# Patient Record
Sex: FEMALE | Race: WHITE | Employment: FULL TIME | URBAN - METROPOLITAN AREA
[De-identification: names, ages, dates, MRNs, and addresses within clinical notes are randomized per-mention and may not be internally consistent; named-entity substitution may affect disease eponyms.]

---

## 2018-08-13 ENCOUNTER — TELEPHONE (OUTPATIENT)
Dept: ORTHOPEDIC SURGERY | Age: 48
End: 2018-08-13

## 2018-08-13 ENCOUNTER — OFFICE VISIT (OUTPATIENT)
Dept: ORTHOPEDIC SURGERY | Age: 48
End: 2018-08-13

## 2018-08-13 VITALS
HEIGHT: 66 IN | RESPIRATION RATE: 16 BRPM | BODY MASS INDEX: 47.09 KG/M2 | WEIGHT: 293 LBS | SYSTOLIC BLOOD PRESSURE: 136 MMHG | HEART RATE: 74 BPM | DIASTOLIC BLOOD PRESSURE: 80 MMHG

## 2018-08-13 DIAGNOSIS — S92.301A CLOSED AVULSION FRACTURE OF METATARSAL BONE OF RIGHT FOOT, INITIAL ENCOUNTER: Primary | ICD-10-CM

## 2018-08-13 PROCEDURE — 99214 OFFICE O/P EST MOD 30 MIN: CPT | Performed by: PHYSICIAN ASSISTANT

## 2018-08-13 PROCEDURE — 1036F TOBACCO NON-USER: CPT | Performed by: PHYSICIAN ASSISTANT

## 2018-08-13 PROCEDURE — G8417 CALC BMI ABV UP PARAM F/U: HCPCS | Performed by: PHYSICIAN ASSISTANT

## 2018-08-13 PROCEDURE — L4360 PNEUMAT WALKING BOOT PRE CST: HCPCS | Performed by: PHYSICIAN ASSISTANT

## 2018-08-13 PROCEDURE — G8427 DOCREV CUR MEDS BY ELIG CLIN: HCPCS | Performed by: PHYSICIAN ASSISTANT

## 2018-08-13 RX ORDER — BUDESONIDE AND FORMOTEROL FUMARATE DIHYDRATE 160; 4.5 UG/1; UG/1
AEROSOL RESPIRATORY (INHALATION)
COMMUNITY
Start: 2018-06-18 | End: 2021-01-06

## 2018-08-13 NOTE — PROGRESS NOTES
Patient Name: Manan Rodriguez  Medical Record Number: [id number]  YOB: 1970  Date of Encounter: 8/13/2018     Chief Complaint   Patient presents with    New Patient     right foot pain. DOI:08/06/2018. History of Present Illness:  Manan Rodriguez is a 53 y.o. female here for evaluation of her right foot. Her pain assessment is documented below and I reviewed this with her today. Patient states one week ago she tripped over a shoe and twisted her right foot. She has had pain around the lateral right foot. She was seen at urgent care and had x-rays and was told she had an avulsion fracture of the proximal fifth metatarsal.  She was fitted into a postop shoe. Patient denies pain to the right ankle, knee or hip. She denies numbness or tingling in the right foot. She is not using crutches, walker, cane or rolling scooter. Pain Assessment  Location of Pain: Foot  Location Modifiers: Right  Severity of Pain: 6  Quality of Pain: Other (Comment) (burning)  Duration of Pain: A few hours  Frequency of Pain: Intermittent  Date Pain First Started: 08/06/18  Aggravating Factors: Walking, Standing, Stairs  Limiting Behavior: Some  Relieving Factors: Rest  Result of Injury: Yes  Work-Related Injury: No  Are there other pain locations you wish to document?: No    Past Medical History:   Diagnosis Date    Asthma        Past Surgical History:   Procedure Laterality Date    CARPAL TUNNEL RELEASE      2 ON EACH SIDE    HERNIA REPAIR      X2    HYSTERECTOMY         Current Outpatient Prescriptions   Medication Sig Dispense Refill    budesonide-formoterol (SYMBICORT) 160-4.5 MCG/ACT AERO INHALE TWO PUFFS BY MOUTH TWICE A DAY      oxyCODONE-acetaminophen (PERCOCET)  MG per tablet       ibuprofen (ADVIL;MOTRIN) 800 MG tablet       mometasone-formoterol (DULERA) 200-5 MCG/ACT inhaler Inhale 2 puffs into the lungs every 12 hours.  montelukast (SINGULAIR) 10 MG tablet Take 10 mg by mouth nightly.       albuterol (PROVENTIL HFA;VENTOLIN HFA) 108 (90 BASE) MCG/ACT inhaler Inhale 2 puffs into the lungs every 6 hours as needed for Wheezing.  diclofenac (VOLTAREN) 75 MG EC tablet       oxyCODONE-acetaminophen (PERCOCET) 5-325 MG per tablet       predniSONE (DELTASONE) 20 MG tablet       NONFORMULARY Diet pill      diazepam (VALIUM) 5 MG tablet Take 1 tablet by mouth every 12 hours as needed (pain, spasm) for up to 10 doses. 10 tablet 0    ibuprofen (ADVIL;MOTRIN) 600 MG tablet Take 1 tablet by mouth every 8 hours as needed for Pain. 20 tablet 0     No current facility-administered medications for this visit. Allergies, social and family histories were reviewed and updated as appropriate. Review of Systems:  Relevant review of systems reviewed and available in the patient's chart under the 'MEDIA' tab. Vital Signs:  /80   Pulse 74   Resp 16   Ht 5' 6\" (1.676 m)   Wt 300 lb (136.1 kg)   BMI 48.42 kg/m²     General Exam:   Constitutional: Patient is adequately groomed with no evidence of malnutrition  Mental Status: The patient is oriented to time, place and person. The patient's mood and affect are appropriate. Lymphatic: The lymphatic examination bilaterally reveals all areas to be without enlargement or induration. Vascular: Examination reveals no swelling or calf tenderness. Peripheral pulses are palpable and 2+. Neurological: The patient has good coordination. There is no focal weakness or sensory deficit. Right Foot Examination:    Inspection: Normal ankle and foot alignment. Normal muscle contours and no significant limb length discrepancy. No gross atrophy in any particular myotome. There is no obvious swelling of the foot. There are no abrasions, lacerations, contusions, hematomas or ecchymosis. There is no erythema, induration or warmth to suggest an infectious process.      Palpation: Patient has tenderness on palpation of the proximal fifth metatarsal    Range of fracture of fifth metatarsal bone of right foot, initial encounter Yes       Treatment Plan:          Patient presented with a possible avulsion fracture of the proximal right fifth metatarsal.  She still having pain she rates a 6/10. She is fitted into a short walking boot. She is advised to come out of the boot to sleep and shower. She is advised to come out of the boot several times a day to ice. She will continue elevating. She is to take Tylenol and/or Motrin as needed for pain. She states she does work as a  and is on her feet for 8 hours. She is given a note stating no more than 4 hours of walking or standing. She states because she has the boot on they will give her a sitdown job. She will return in 2 weeks at which time we will repeat imaging. Kita Molina was informed of the results of any imaging. We discussed treatment options and a time was given to answer questions. A plan was proposed and Kita Molina understand and accepts this course of care. Electronically signed by Koko Ahuja PA-C on 6/50/8254  Board Certified [facility]    Please note that portions of this note were completed with a voice recognition program.  Efforts were made to edit the dictations but occasionally words are mis-transcribed.

## 2018-08-13 NOTE — LETTER
[facility]  [address]  [address] [facility],[address] [address]  Phone: [phone]  Fax: [phone]    Flori Reveles MD        August 13, 2018       Patient: Vanesa Mallory   MR Number: [id number]   YOB: 1970   Date of Visit: 8/13/2018       Dear Dr. Yesy Holt: Thank you for the request for consultation for Miguelito Langley to me for the evaluation of her right foot. Below are the relevant portions of my assessment and plan of care. If you have questions, please do not hesitate to call me. I look forward to following Char Short along with you.     Sincerely,    Dr. Amanda Cristina, PA-C    CC providers:  Yesica Joya MD  [address]  VIA Mail

## 2018-08-13 NOTE — LETTER
[facility] [address] [address]  Phone: [phone]  Fax: [phone]    Yesy Pathak        August 13, 2018     Patient: Mars Millan   YOB: 1970   Date of Visit: 8/13/2018       To Whom It May Concern: It is my medical opinion that Irais Powell may return to light duty immediately with the following restrictions: may work only 4 hour shifts and must wear boot. Restrictions are in effect for 2 weeks until reevaluated. .    If you have any questions or concerns, please don't hesitate to call.     Sincerely,          Ander Wallis PA-C

## 2018-08-13 NOTE — TELEPHONE ENCOUNTER
8/13/18  DME  - NO PRECERT REQUIRED FOR IN NETWORK PROVIDERS AND LESS THAN $750 - PER [facility] GUIDELINES/NOTES - NDS

## 2018-08-13 NOTE — TELEPHONE ENCOUNTER
Spoke with marylou. Patient may return to work with sit down only job for 2 weeks until follow up  appt on 08/31/2018.  Patient came into office and picked up note

## 2018-08-14 ENCOUNTER — TELEPHONE (OUTPATIENT)
Dept: ORTHOPEDIC SURGERY | Age: 48
End: 2018-08-14

## 2018-08-16 ENCOUNTER — TELEPHONE (OUTPATIENT)
Dept: ORTHOPEDIC SURGERY | Age: 48
End: 2018-08-16

## 2018-08-16 NOTE — TELEPHONE ENCOUNTER
Patient calling to get a RTW work note. Currently she is to wear a boot however her employer is unable to find thing for her to do. She is wanting to take the boot off, wrap her foot in ace wrap (well padded) and put her shoe on. 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 She will keep the current restictions other than wearing the boot. If we can not change this and allow her to wear a shoe, they will keep sending the patient home unable to work.      Please call to discuss  [phone]

## 2018-08-31 ENCOUNTER — OFFICE VISIT (OUTPATIENT)
Dept: ORTHOPEDIC SURGERY | Age: 48
End: 2018-08-31

## 2018-08-31 VITALS — BODY MASS INDEX: 47.09 KG/M2 | HEIGHT: 66 IN | WEIGHT: 293 LBS

## 2018-08-31 DIAGNOSIS — S92.354D CLOSED NONDISPLACED FRACTURE OF FIFTH METATARSAL BONE OF RIGHT FOOT WITH ROUTINE HEALING, SUBSEQUENT ENCOUNTER: Primary | ICD-10-CM

## 2018-08-31 PROCEDURE — G8417 CALC BMI ABV UP PARAM F/U: HCPCS | Performed by: PHYSICIAN ASSISTANT

## 2018-08-31 PROCEDURE — 99213 OFFICE O/P EST LOW 20 MIN: CPT | Performed by: PHYSICIAN ASSISTANT

## 2018-08-31 PROCEDURE — 1036F TOBACCO NON-USER: CPT | Performed by: PHYSICIAN ASSISTANT

## 2018-08-31 PROCEDURE — G8427 DOCREV CUR MEDS BY ELIG CLIN: HCPCS | Performed by: PHYSICIAN ASSISTANT

## 2018-09-04 ENCOUNTER — TELEPHONE (OUTPATIENT)
Dept: ORTHOPEDIC SURGERY | Age: 48
End: 2018-09-04

## 2018-09-04 NOTE — TELEPHONE ENCOUNTER
Pt states she was seen by Edson Paula and needs her rtw note changed   Wants her note to say that she was seen on 8/31/18 and has restrictions until her next mandeep ton 9/28/18  Pt can't speak until after 3 pm but is wanting to  new note this afternoon  pls call pt

## 2018-09-28 ENCOUNTER — OFFICE VISIT (OUTPATIENT)
Dept: ORTHOPEDIC SURGERY | Age: 48
End: 2018-09-28
Payer: COMMERCIAL

## 2018-09-28 VITALS
HEIGHT: 66 IN | SYSTOLIC BLOOD PRESSURE: 124 MMHG | DIASTOLIC BLOOD PRESSURE: 79 MMHG | HEART RATE: 86 BPM | BODY MASS INDEX: 47.09 KG/M2 | WEIGHT: 293 LBS

## 2018-09-28 DIAGNOSIS — S92.354D CLOSED NONDISPLACED FRACTURE OF FIFTH METATARSAL BONE OF RIGHT FOOT WITH ROUTINE HEALING, SUBSEQUENT ENCOUNTER: Primary | ICD-10-CM

## 2018-09-28 PROCEDURE — 1036F TOBACCO NON-USER: CPT | Performed by: PHYSICIAN ASSISTANT

## 2018-09-28 PROCEDURE — 99213 OFFICE O/P EST LOW 20 MIN: CPT | Performed by: PHYSICIAN ASSISTANT

## 2018-09-28 PROCEDURE — G8417 CALC BMI ABV UP PARAM F/U: HCPCS | Performed by: PHYSICIAN ASSISTANT

## 2018-09-28 PROCEDURE — G8427 DOCREV CUR MEDS BY ELIG CLIN: HCPCS | Performed by: PHYSICIAN ASSISTANT

## 2018-09-28 NOTE — LETTER
[facility]  [address] [address]  Phone: [phone]  Fax: [phone]    Severo Reas        September 28, 2018     Patient: Reg Clarke   YOB: 1970   Date of Visit: 9/28/2018       To Whom it May Concern:    Freddy Eckert was seen in my clinic on 9/28/2018. If you have any questions or concerns, please don't hesitate to call.     Sincerely,         Westley Young PA-C

## 2018-09-28 NOTE — LETTER
[address]  [address] [address]  Phone: [phone]  Fax: [phone]    Vanda Mixonsamuel        September 28, 2018     Patient: Shirley Landa   YOB: 1970   Date of Visit: 9/28/2018       To Whom It May Concern: It is my medical opinion that Bairon Covington may return to light duty immediately with the following restrictions: 4 weeks of no more than 4 hours of walking or standing daily. .    If you have any questions or concerns, please don't hesitate to call.     Sincerely,        Adam Lane PA-C

## 2018-10-19 ENCOUNTER — OFFICE VISIT (OUTPATIENT)
Dept: ORTHOPEDIC SURGERY | Age: 48
End: 2018-10-19
Payer: COMMERCIAL

## 2018-10-19 VITALS
SYSTOLIC BLOOD PRESSURE: 116 MMHG | HEIGHT: 66 IN | BODY MASS INDEX: 47.09 KG/M2 | HEART RATE: 72 BPM | DIASTOLIC BLOOD PRESSURE: 66 MMHG | WEIGHT: 293 LBS

## 2018-10-19 DIAGNOSIS — S92.354D CLOSED NONDISPLACED FRACTURE OF FIFTH METATARSAL BONE OF RIGHT FOOT WITH ROUTINE HEALING, SUBSEQUENT ENCOUNTER: Primary | ICD-10-CM

## 2018-10-19 PROCEDURE — 1036F TOBACCO NON-USER: CPT | Performed by: PHYSICIAN ASSISTANT

## 2018-10-19 PROCEDURE — G8417 CALC BMI ABV UP PARAM F/U: HCPCS | Performed by: PHYSICIAN ASSISTANT

## 2018-10-19 PROCEDURE — 99213 OFFICE O/P EST LOW 20 MIN: CPT | Performed by: PHYSICIAN ASSISTANT

## 2018-10-19 PROCEDURE — G8484 FLU IMMUNIZE NO ADMIN: HCPCS | Performed by: PHYSICIAN ASSISTANT

## 2018-10-19 PROCEDURE — G8427 DOCREV CUR MEDS BY ELIG CLIN: HCPCS | Performed by: PHYSICIAN ASSISTANT

## 2018-10-19 NOTE — LETTER
[facility] [address] [address]  Phone: [phone]  Fax: [phone]    Tashia Juan Manuelkeiko        October 19, 2018     Patient: Karson Mixon   YOB: 1970   Date of Visit: 10/19/2018       To Whom It May Concern: It is my medical opinion that Nenolee Tonouckles should remain out of work until 4 more weeks. .    If you have any questions or concerns, please don't hesitate to call.     Sincerely,          Melanie San PA-C

## 2018-10-22 ENCOUNTER — TELEPHONE (OUTPATIENT)
Dept: ORTHOPEDIC SURGERY | Age: 48
End: 2018-10-22

## 2018-10-31 ENCOUNTER — HOSPITAL ENCOUNTER (OUTPATIENT)
Dept: MRI IMAGING | Age: 48
Discharge: HOME | End: 2018-10-31
Payer: COMMERCIAL

## 2018-10-31 DIAGNOSIS — S92.354D CLOSED NONDISPLACED FRACTURE OF FIFTH METATARSAL BONE OF RIGHT FOOT WITH ROUTINE HEALING, SUBSEQUENT ENCOUNTER: ICD-10-CM

## 2018-10-31 PROCEDURE — 73718 MRI LOWER EXTREMITY W/O DYE: CPT

## 2018-11-02 ENCOUNTER — TELEPHONE (OUTPATIENT)
Dept: ORTHOPEDIC SURGERY | Age: 48
End: 2018-11-02

## 2018-11-02 DIAGNOSIS — M79.671 RIGHT FOOT PAIN: Primary | ICD-10-CM

## 2018-11-14 ENCOUNTER — HOSPITAL ENCOUNTER (OUTPATIENT)
Dept: PHYSICAL THERAPY | Age: 48
Setting detail: THERAPIES SERIES
Discharge: HOME | End: 2018-11-14
Payer: COMMERCIAL

## 2018-11-14 PROCEDURE — 97161 PT EVAL LOW COMPLEX 20 MIN: CPT

## 2018-11-14 PROCEDURE — 97110 THERAPEUTIC EXERCISES: CPT

## 2018-11-14 PROCEDURE — 97140 MANUAL THERAPY 1/> REGIONS: CPT

## 2018-11-14 PROCEDURE — G8979 MOBILITY GOAL STATUS: HCPCS

## 2018-11-14 PROCEDURE — 97530 THERAPEUTIC ACTIVITIES: CPT

## 2018-11-14 PROCEDURE — G8978 MOBILITY CURRENT STATUS: HCPCS

## 2018-11-14 ASSESSMENT — PAIN DESCRIPTION - LOCATION: LOCATION: FOOT

## 2018-11-14 ASSESSMENT — PAIN SCALES - GENERAL: PAINLEVEL_OUTOF10: 6

## 2018-11-14 ASSESSMENT — PAIN DESCRIPTION - FREQUENCY: FREQUENCY: INTERMITTENT

## 2018-11-14 ASSESSMENT — PAIN DESCRIPTION - DESCRIPTORS: DESCRIPTORS: BURNING

## 2018-11-14 ASSESSMENT — PAIN DESCRIPTION - PAIN TYPE: TYPE: CHRONIC PAIN

## 2018-11-14 ASSESSMENT — PAIN DESCRIPTION - ORIENTATION: ORIENTATION: RIGHT

## 2018-11-14 NOTE — FLOWSHEET NOTE
Physical Therapy Daily/Aquatic Flow Sheet   Date:  2018    Patient Name:  Shanda Schneider    :  1970  MRN: [id number]  Restrictions/Precautions:    Medical/Treatment Diagnosis Information:   · Diagnosis: R foot pain  · Treatment Diagnosis: decreased ROM and strength in her foot, and poor gait    Tracking Information:  Physician Information Referring Practitioner: Chandra Anders     Plan of Care Sent Date: 14 Signed Received:    Visit Count / Total Visits      Insurance Approved Visits  /  Approved Dates:     Insurance Information PT Insurance Information: caresource     Progress Note/G-codes   [x]  Yes  []  No Next Due:      Pain level: 4/10    Subjective:  See eval    Objective:   Observation:   Test measurements:    Land-based Therapy Dates of Service:    Land-based Visits Exercises/Activities:  Exercise/Equipment Resistance/Repetitions Other comments                                                                              AquaticTherapy Dates of Service:   Aquatic Visits Exercises/Activities:   Transfers:          % Immersion:            Ambulation:   UE Exercises:       Forwards   x Shoulder Shrugs      Lateral   x Shoulder Circles      Retro   x Scapular Retraction       Marching   Push Downs       Cariocas   Punching     Jog    Rowing     Multifidi walkouts with paddle   Elbow Flex/Ext       Shldr Flex/Ext       Shldr aBd/aDd    LE Exercises:  Shldr Horiz aBd/aDd    HR/TR x Shldr IR/ER    Marches x Arm Circles    Squats  x PNF Diagonals    Hamstring Curls x Wall Push Ups    Hip Flexion (SLR) x Figure 8's    Hip aBduction (SLR) x Bilateral Pull Downs    Hip Extension (SLR) x      Hip aDduction (SLR)      Hip Circles x Functional:    Hip IR/Er x Step up forward    TrA Set   Step up lateral     Pelvic Tilts  x Step down     Fig 8's   Lunges Forward    LE PNF  Lunges Retro      Lunges Lateral     Balance:   Lower ab curl with noodle     SLS  x      Tandem Stance

## 2018-11-19 ENCOUNTER — TELEPHONE (OUTPATIENT)
Dept: ORTHOPEDIC SURGERY | Age: 48
End: 2018-11-19

## 2018-11-26 ENCOUNTER — OFFICE VISIT (OUTPATIENT)
Dept: ORTHOPEDIC SURGERY | Age: 48
End: 2018-11-26
Payer: COMMERCIAL

## 2018-11-26 VITALS
DIASTOLIC BLOOD PRESSURE: 86 MMHG | WEIGHT: 293 LBS | SYSTOLIC BLOOD PRESSURE: 126 MMHG | HEART RATE: 85 BPM | HEIGHT: 66 IN | BODY MASS INDEX: 47.09 KG/M2

## 2018-11-26 DIAGNOSIS — E66.01 OBESITY, CLASS III, BMI 40-49.9 (MORBID OBESITY) (HCC): ICD-10-CM

## 2018-11-26 DIAGNOSIS — M17.12 PRIMARY OSTEOARTHRITIS OF LEFT KNEE: Primary | ICD-10-CM

## 2018-11-26 DIAGNOSIS — M17.11 PRIMARY OSTEOARTHRITIS OF RIGHT KNEE: ICD-10-CM

## 2018-11-26 PROCEDURE — 1036F TOBACCO NON-USER: CPT | Performed by: PHYSICIAN ASSISTANT

## 2018-11-26 PROCEDURE — 99213 OFFICE O/P EST LOW 20 MIN: CPT | Performed by: PHYSICIAN ASSISTANT

## 2018-11-26 PROCEDURE — G8427 DOCREV CUR MEDS BY ELIG CLIN: HCPCS | Performed by: PHYSICIAN ASSISTANT

## 2018-11-26 PROCEDURE — G8484 FLU IMMUNIZE NO ADMIN: HCPCS | Performed by: PHYSICIAN ASSISTANT

## 2018-11-26 PROCEDURE — G8417 CALC BMI ABV UP PARAM F/U: HCPCS | Performed by: PHYSICIAN ASSISTANT

## 2018-11-26 NOTE — PROGRESS NOTES
Patient [address] [facility]  Medical Record Number: [id number]  YOB: 1970  Date of Encounter: 11/26/2018     Chief Complaint   Patient presents with    Knee Pain     new problem, B knee pain for 2-3 yrs now        History of Present Illness:  Mickey Le is a 53 y.o. female here for evaluation of her chronic bilateral knee pain. Her pain assessment is documented below and I reviewed this with her today. Patient states she has had bilateral knee pain worsening over the last several years. She had a left knee arthroscopy in 2016 and a right knee arthroscopy in 2017. She states she saw at least 5 orthopedic surgeons around that time who told her she really needs a knee replacement and would give very little benefit from arthroscopy. It was not until she found Dr. Madie Soler in [place] that she was given the option of arthroscopy. She states the arthroscopies provided little relief and she is back to having pain she rates an 8/10 bilaterally. She denies any recent injury. She denies swelling of her knees bilaterally. She states she has generalized knee pain that is worse over the medial knee. She does take ibuprofen as needed for pain. She is prescribed Percocet 10 which she takes daily. She has been seen in this office recently for a avulsion fracture of her proximal fifth metatarsal. This as healed however she has had chronic pain from this also and is scheduled to start aqua therapy in the near future. She is still on light duty at work doing sitting only.      Pain Assessment  Location of Pain: Knee  Location Modifiers: Right, Left  Severity of Pain: 8  Quality of Pain: Aching, Locking, Dull  Frequency of Pain: Constant  Aggravating Factors: Walking, Stairs  Limiting Behavior: Some  Relieving Factors: Rest  Result of Injury: No  Work-Related Injury: No  Are there other pain locations you wish to document?: No    Past Medical History:   Diagnosis Date    Asthma        Past Surgical History: Procedure Laterality Date    CARPAL TUNNEL RELEASE      2 ON EACH SIDE    HERNIA REPAIR      X2    HYSTERECTOMY         Current Outpatient Prescriptions   Medication Sig Dispense Refill    diclofenac sodium (VOLTAREN) 1 % GEL Apply 4 g topically 4 times daily 5 Tube 0    budesonide-formoterol (SYMBICORT) 160-4.5 MCG/ACT AERO INHALE TWO PUFFS BY MOUTH TWICE A DAY      oxyCODONE-acetaminophen (PERCOCET)  MG per tablet       diclofenac (VOLTAREN) 75 MG EC tablet       oxyCODONE-acetaminophen (PERCOCET) 5-325 MG per tablet       predniSONE (DELTASONE) 20 MG tablet       NONFORMULARY Diet pill      diazepam (VALIUM) 5 MG tablet Take 1 tablet by mouth every 12 hours as needed (pain, spasm) for up to 10 doses. 10 tablet 0    mometasone-formoterol (DULERA) 200-5 MCG/ACT inhaler Inhale 2 puffs into the lungs every 12 hours.  ibuprofen (ADVIL;MOTRIN) 600 MG tablet Take 1 tablet by mouth every 8 hours as needed for Pain. 20 tablet 0    montelukast (SINGULAIR) 10 MG tablet Take 10 mg by mouth nightly.  albuterol (PROVENTIL HFA;VENTOLIN HFA) 108 (90 BASE) MCG/ACT inhaler Inhale 2 puffs into the lungs every 6 hours as needed for Wheezing. No current facility-administered medications for this visit. Allergies, social and family histories were reviewed and updated as appropriate. Review of Systems:  Relevant review of systems reviewed and available in the patient's chart under the 'MEDIA' tab. Vital Signs:  /86   Pulse 85   Ht 5' 6\" (1.676 m)   Wt 300 lb (136.1 kg)   BMI 48.42 kg/m²     General Exam:   Constitutional: Patient is adequately groomed with no evidence of malnutrition  Mental Status: The patient is oriented to time, place and person. The patient's mood and affect are appropriate. Lymphatic: The lymphatic examination bilaterally reveals all areas to be without enlargement or induration. Neurological: The patient has good coordination and balance.   There is no osteoarthritis of right knee     Obesity, Class III, BMI 40-49.9 (morbid obesity) ([address]        Office Procedures:  Orders Placed This Encounter   Procedures    XR Knee Bilateral Standing    XR KNEE LEFT (3 VIEWS)    XR KNEE RIGHT (3 VIEWS)    [facility]     Referral Priority:   Routine     Referral Type:   Eval and Treat     Referral Reason:   Specialty Services Required     Requested Specialty:   Bariatric Surgery     Number of Visits Requested:   1    HYALGAN/LISA (For Auth/Precert)     Standing Status:   Future     Standing Expiration Date:   11/26/2019       Treatment Plan:          Patient presented with chronic bilateral knee pain. She has had no recent injury. X-rays confirm severe arthritis. She takes ibuprofen regularly. She is chronically prescribed Percocet 10. She has tried cortisone injections in the past with very little relief and states she is not interested in additional cortisone injections. We will submit to insurance for joint fluid injections. She is also given a prescription of diclofenac cream.  Advised patient her biggest leigh to TKA at this point is her weight with a BMI of 48.42. She states she has an appointment with her PCP, Dr. Josselyn Gooden, on 12/4. Patient states Dr. Josselyn Gooden prescribes her Adipex as needed. Advised patient I feel she would benefit more from seeing the weight management Center here at [facility] as she will meet with several different specialist within the weight management team.  She is given a referral.  She is still on light duty because of her right fifth metatarsal fracture. She will be starting aqua therapy in the near future which could also help with her knees. Patient will plan on following up in 8 weeks or before that time with any concerns. Bronwyn Moore was informed of the results of any imaging. We discussed treatment options and a time was given to answer questions.  A plan was proposed and Partha

## 2018-11-29 ENCOUNTER — APPOINTMENT (OUTPATIENT)
Dept: PHYSICAL THERAPY | Age: 48
End: 2018-11-29
Payer: COMMERCIAL

## 2018-12-04 ENCOUNTER — HOSPITAL ENCOUNTER (OUTPATIENT)
Dept: PHYSICAL THERAPY | Age: 48
Setting detail: THERAPIES SERIES
Discharge: HOME | End: 2018-12-04
Payer: COMMERCIAL

## 2018-12-04 PROCEDURE — 97140 MANUAL THERAPY 1/> REGIONS: CPT

## 2018-12-04 PROCEDURE — 97110 THERAPEUTIC EXERCISES: CPT

## 2018-12-04 NOTE — FLOWSHEET NOTE
Physical Therapy  Cancellation/No-show Note  Patient Name:  Philipp Murphy  :  1970   Date:  2018  Cancelled visits to date: 0  No-shows to date: 1    Patient status for today's appointment patient:  []  Cancelled  []  Rescheduled appointment  [x]  No-show 18     Reason given by patient:  []  Patient ill  []  Conflicting appointment  []  No transportation    []  Conflict with work  []  No reason given  [x]  Other:     Comments:      Phone call information:   []  Phone call made today to patient at _ time at number provided:      []  Patient answered, conversation as follows:    []  Patient did not answer, message left as follows:  [x]  Phone call not made today    Electronically signed by:  Yony Carrera PT

## 2018-12-04 NOTE — FLOWSHEET NOTE
Physical Therapy Daily/Aquatic Flow Sheet   Date:  2018    Patient Name:  Keli Gutierrez    :  1970  MRN: [id number]  Restrictions/Precautions:  B knee pain- pt states Stacey Tripathi PAC at Dr. Torres Police office said she will need B TKR and has advised joint fluid injections and wt loss at this time     Medical/Treatment Diagnosis Information:   ·   Diagnosis: R foot pain  · Treatment Diagnosis: decreased ROM and strength in her foot, and poor gait          Tracking Information:  Physician Information Referring Practitioner: Rolando Belle     Plan of Care Sent Date: 14 Signed Received: yes   Visit Count / Total Visits      Insurance Approved Visits  /  Approved Dates:     Insurance Information PT Insurance Information: caresource     Progress Note/G-codes   []  Yes  [x]  No Next Due:      Pain level: 8/10    Subjective:  Doing HEP. Reports the toe raises made her knees hurt. increased ankle pain on the outside since yesterday - not sure why. States the ankle ABC exercise helps bc it stretches her R ankle    Objective:   Observation:   Test measurements:  AROM R DF: 9 deg no pain    Land-based Therapy Dates of Service:   - PT eval,   Land-based Visits Exercises/Activities:  Exercise/Equipment Resistance/Repetitions Other comments   Nustep  S=13, arms = 12, workload 2, 5 min    IB/HR 2x30\"/2x5    prostretch 2x10 with 5 sec holds at [facility]    // bars BAPS L2, R LE on BAPS  with L LE on floor: 10x ea a/p, M/l, cw/ccw    Wobble board in a/p direction:   B stance 3x10\"  Wt shifts: a/p  And m/l:  10x ea                                                             AquaticTherapy Dates of Service:   Aquatic Visits Exercises/Activities:   Transfers:          % Immersion:            Ambulation:   UE Exercises:       Forwards   x Shoulder Shrugs      Lateral   x Shoulder Circles      Retro   x Scapular Retraction       Marching   Push Downs       Cariocas   Punching

## 2018-12-06 ENCOUNTER — HOSPITAL ENCOUNTER (OUTPATIENT)
Dept: PHYSICAL THERAPY | Age: 48
Setting detail: THERAPIES SERIES
Discharge: HOME | End: 2018-12-06
Payer: COMMERCIAL

## 2018-12-06 PROCEDURE — 97150 GROUP THERAPEUTIC PROCEDURES: CPT

## 2018-12-06 PROCEDURE — 97113 AQUATIC THERAPY/EXERCISES: CPT

## 2018-12-11 ENCOUNTER — APPOINTMENT (OUTPATIENT)
Dept: PHYSICAL THERAPY | Age: 48
End: 2018-12-11
Payer: COMMERCIAL

## 2018-12-13 ENCOUNTER — HOSPITAL ENCOUNTER (OUTPATIENT)
Dept: PHYSICAL THERAPY | Age: 48
Setting detail: THERAPIES SERIES
Discharge: HOME | End: 2018-12-13
Payer: COMMERCIAL

## 2018-12-13 PROCEDURE — 97150 GROUP THERAPEUTIC PROCEDURES: CPT

## 2018-12-13 PROCEDURE — 97113 AQUATIC THERAPY/EXERCISES: CPT

## 2018-12-20 ENCOUNTER — APPOINTMENT (OUTPATIENT)
Dept: PHYSICAL THERAPY | Age: 48
End: 2018-12-20
Payer: COMMERCIAL

## 2018-12-27 NOTE — FLOWSHEET NOTE
Physical Therapy  Cancellation/No-show Note  Patient Name:  Mickey Le  :  1970   Date:  2018  Cancelled visits to date: 0  No-shows to date: 2    Patient status for today's appointment patient:  []  Cancelled  []  Rescheduled appointment  [x]  No-show 18 (land), 18 (land)     Reason given by patient:  []  Patient ill  []  Conflicting appointment  []  No transportation    []  Conflict with work  []  No reason given  [x]  Other:     Comments:      Phone call information:   []  Phone call made today to patient at _ time at number provided:      []  Patient answered, conversation as follows:    []  Patient did not answer, message left as follows:  [x]  Phone call not made today    Electronically signed by:  Reanna Sutton, PT
Physical Therapy  Cancellation/No-show Note  Patient Name:  Sylvia Barht  :  1970   Date:  2018  Cancelled visits to date: 0  No-shows to date: 3    Patient status for today's appointment patient:  []  Cancelled  []  Rescheduled appointment  [x]  No-show 18 (land), 18 (land)   (pool)     Reason given by patient:  []  Patient ill  []  Conflicting appointment  []  No transportation    []  Conflict with work  []  No reason given  []  Other:     Comments:      Phone call information:   []  Phone call made today to patient at _ time at number provided:      []  Patient answered, conversation as follows:    []  Patient did not answer, message left as follows:  [x]  Phone call not made today    Electronically signed by:  Vanessa Hart PT
Treatment Minutes:  20    Total Treatment Minutes:  33    Treatment/Activity Tolerance:  [x] Patient tolerated treatment well [] Patient limited by fatigue  [] Patient limited by pain  [] Patient limited by other medical complications  [] Other:     Prognosis: [x] Good [] Fair  [] Poor    Patient Requires Follow-up: [x] Yes  [] No    Plan:   [x] Continue per plan of care [] Alter current plan (see comments)  [] Plan of care initiated [] Hold pending MD visit [] Discharge    Plan for Next Session:  Aquatics, stretching, gait training    Electronically signed by:  Char Ladd, PT DPT

## 2019-01-03 ENCOUNTER — HOSPITAL ENCOUNTER (EMERGENCY)
Age: 49
Discharge: HOME | End: 2019-01-03
Attending: EMERGENCY MEDICINE
Payer: COMMERCIAL

## 2019-01-03 ENCOUNTER — APPOINTMENT (OUTPATIENT)
Dept: GENERAL RADIOLOGY | Age: 49
End: 2019-01-03
Payer: COMMERCIAL

## 2019-01-03 ENCOUNTER — TELEPHONE (OUTPATIENT)
Dept: ORTHOPEDIC SURGERY | Age: 49
End: 2019-01-03

## 2019-01-03 VITALS
SYSTOLIC BLOOD PRESSURE: 158 MMHG | OXYGEN SATURATION: 95 % | RESPIRATION RATE: 18 BRPM | HEART RATE: 102 BPM | DIASTOLIC BLOOD PRESSURE: 88 MMHG | WEIGHT: 293 LBS | TEMPERATURE: 97.9 F | HEIGHT: 66 IN | BODY MASS INDEX: 47.09 KG/M2

## 2019-01-03 DIAGNOSIS — J44.1 OBSTRUCTIVE CHRONIC BRONCHITIS WITH EXACERBATION (HCC): Primary | ICD-10-CM

## 2019-01-03 LAB
A/G RATIO: 0.9 (ref 1.1–2.2)
ALBUMIN SERPL-MCNC: 3.4 G/DL (ref 3.4–5)
ALP BLD-CCNC: 122 U/L (ref 40–129)
ALT SERPL-CCNC: 18 U/L (ref 10–40)
ANION GAP SERPL CALCULATED.3IONS-SCNC: 13 MMOL/L (ref 3–16)
AST SERPL-CCNC: 12 U/L (ref 15–37)
BASOPHILS ABSOLUTE: 0.1 K/UL (ref 0–0.2)
BASOPHILS RELATIVE PERCENT: 0.4 %
BILIRUB SERPL-MCNC: 0.3 MG/DL (ref 0–1)
BUN BLDV-MCNC: 15 MG/DL (ref 7–20)
CALCIUM SERPL-MCNC: 8.8 MG/DL (ref 8.3–10.6)
CHLORIDE BLD-SCNC: 106 MMOL/L (ref 99–110)
CO2: 24 MMOL/L (ref 21–32)
CREAT SERPL-MCNC: 0.7 MG/DL (ref 0.6–1.1)
EKG ATRIAL RATE: 96 BPM
EKG DIAGNOSIS: NORMAL
EKG P AXIS: 68 DEGREES
EKG P-R INTERVAL: 130 MS
EKG Q-T INTERVAL: 352 MS
EKG QRS DURATION: 110 MS
EKG QTC CALCULATION (BAZETT): 444 MS
EKG R AXIS: -27 DEGREES
EKG T AXIS: 58 DEGREES
EKG VENTRICULAR RATE: 96 BPM
EOSINOPHILS ABSOLUTE: 0.1 K/UL (ref 0–0.6)
EOSINOPHILS RELATIVE PERCENT: 0.3 %
GFR AFRICAN AMERICAN: >60
GFR NON-AFRICAN AMERICAN: >60
GLOBULIN: 3.9 G/DL
GLUCOSE BLD-MCNC: 105 MG/DL (ref 70–99)
HCT VFR BLD CALC: 38.5 % (ref 36–48)
HEMOGLOBIN: 12.5 G/DL (ref 12–16)
LIPASE: 16 U/L (ref 13–60)
LYMPHOCYTES ABSOLUTE: 1.7 K/UL (ref 1–5.1)
LYMPHOCYTES RELATIVE PERCENT: 10.5 %
MCH RBC QN AUTO: 29.2 PG (ref 26–34)
MCHC RBC AUTO-ENTMCNC: 32.5 G/DL (ref 31–36)
MCV RBC AUTO: 89.9 FL (ref 80–100)
MONOCYTES ABSOLUTE: 1 K/UL (ref 0–1.3)
MONOCYTES RELATIVE PERCENT: 6.1 %
NEUTROPHILS ABSOLUTE: 13.7 K/UL (ref 1.7–7.7)
NEUTROPHILS RELATIVE PERCENT: 82.7 %
PDW BLD-RTO: 15.4 % (ref 12.4–15.4)
PLATELET # BLD: 273 K/UL (ref 135–450)
PMV BLD AUTO: 7.6 FL (ref 5–10.5)
POTASSIUM REFLEX MAGNESIUM: 4 MMOL/L (ref 3.5–5.1)
RBC # BLD: 4.28 M/UL (ref 4–5.2)
SODIUM BLD-SCNC: 143 MMOL/L (ref 136–145)
TOTAL PROTEIN: 7.3 G/DL (ref 6.4–8.2)
TROPONIN: <0.01 NG/ML
WBC # BLD: 16.6 K/UL (ref 4–11)

## 2019-01-03 PROCEDURE — 99285 EMERGENCY DEPT VISIT HI MDM: CPT

## 2019-01-03 PROCEDURE — 6370000000 HC RX 637 (ALT 250 FOR IP): Performed by: EMERGENCY MEDICINE

## 2019-01-03 PROCEDURE — 94664 DEMO&/EVAL PT USE INHALER: CPT

## 2019-01-03 PROCEDURE — 93010 ELECTROCARDIOGRAM REPORT: CPT | Performed by: INTERNAL MEDICINE

## 2019-01-03 PROCEDURE — 71046 X-RAY EXAM CHEST 2 VIEWS: CPT

## 2019-01-03 PROCEDURE — 94640 AIRWAY INHALATION TREATMENT: CPT

## 2019-01-03 PROCEDURE — 84484 ASSAY OF TROPONIN QUANT: CPT

## 2019-01-03 PROCEDURE — 83690 ASSAY OF LIPASE: CPT

## 2019-01-03 PROCEDURE — 93005 ELECTROCARDIOGRAM TRACING: CPT | Performed by: EMERGENCY MEDICINE

## 2019-01-03 PROCEDURE — 85025 COMPLETE CBC W/AUTO DIFF WBC: CPT

## 2019-01-03 PROCEDURE — 80053 COMPREHEN METABOLIC PANEL: CPT

## 2019-01-03 RX ORDER — IPRATROPIUM BROMIDE AND ALBUTEROL SULFATE 2.5; .5 MG/3ML; MG/3ML
1 SOLUTION RESPIRATORY (INHALATION)
Status: COMPLETED | OUTPATIENT
Start: 2019-01-03 | End: 2019-01-03

## 2019-01-03 RX ORDER — ACETAMINOPHEN 325 MG/1
TABLET ORAL
Status: DISCONTINUED
Start: 2019-01-03 | End: 2019-01-03 | Stop reason: HOSPADM

## 2019-01-03 RX ORDER — AZITHROMYCIN 250 MG/1
TABLET, FILM COATED ORAL
Qty: 1 PACKET | Refills: 0 | Status: SHIPPED | OUTPATIENT
Start: 2019-01-03 | End: 2019-01-07

## 2019-01-03 RX ORDER — ACETAMINOPHEN 325 MG/1
650 TABLET ORAL ONCE
Status: COMPLETED | OUTPATIENT
Start: 2019-01-03 | End: 2019-01-03

## 2019-01-03 RX ADMIN — IPRATROPIUM BROMIDE AND ALBUTEROL SULFATE 1 AMPULE: .5; 3 SOLUTION RESPIRATORY (INHALATION) at 02:11

## 2019-01-03 RX ADMIN — IPRATROPIUM BROMIDE AND ALBUTEROL SULFATE 1 AMPULE: .5; 3 SOLUTION RESPIRATORY (INHALATION) at 02:10

## 2019-01-03 RX ADMIN — IPRATROPIUM BROMIDE AND ALBUTEROL SULFATE 1 AMPULE: .5; 3 SOLUTION RESPIRATORY (INHALATION) at 02:09

## 2019-01-03 RX ADMIN — ACETAMINOPHEN 650 MG: 325 TABLET, FILM COATED ORAL at 02:46

## 2019-01-03 ASSESSMENT — PAIN DESCRIPTION - ORIENTATION: ORIENTATION: LEFT

## 2019-01-03 ASSESSMENT — PAIN DESCRIPTION - LOCATION: LOCATION: CHEST

## 2019-01-03 ASSESSMENT — PAIN SCALES - GENERAL
PAINLEVEL_OUTOF10: 10
PAINLEVEL_OUTOF10: 10

## 2019-01-30 ENCOUNTER — OFFICE VISIT (OUTPATIENT)
Dept: ORTHOPEDIC SURGERY | Age: 49
End: 2019-01-30
Payer: COMMERCIAL

## 2019-01-30 VITALS
SYSTOLIC BLOOD PRESSURE: 151 MMHG | WEIGHT: 293 LBS | DIASTOLIC BLOOD PRESSURE: 84 MMHG | HEIGHT: 66 IN | BODY MASS INDEX: 47.09 KG/M2 | HEART RATE: 86 BPM

## 2019-01-30 DIAGNOSIS — M25.571 PAIN IN LATERAL PORTION OF RIGHT ANKLE: Primary | ICD-10-CM

## 2019-01-30 PROCEDURE — G8427 DOCREV CUR MEDS BY ELIG CLIN: HCPCS | Performed by: PHYSICIAN ASSISTANT

## 2019-01-30 PROCEDURE — 99213 OFFICE O/P EST LOW 20 MIN: CPT | Performed by: PHYSICIAN ASSISTANT

## 2019-01-30 PROCEDURE — 1036F TOBACCO NON-USER: CPT | Performed by: PHYSICIAN ASSISTANT

## 2019-01-30 PROCEDURE — G8484 FLU IMMUNIZE NO ADMIN: HCPCS | Performed by: PHYSICIAN ASSISTANT

## 2019-01-30 PROCEDURE — G8417 CALC BMI ABV UP PARAM F/U: HCPCS | Performed by: PHYSICIAN ASSISTANT

## 2019-01-30 RX ORDER — IBUPROFEN 800 MG/1
800 TABLET ORAL EVERY 6 HOURS PRN
COMMUNITY

## 2019-01-31 ENCOUNTER — TELEPHONE (OUTPATIENT)
Dept: ORTHOPEDIC SURGERY | Age: 49
End: 2019-01-31

## 2019-02-01 ENCOUNTER — TELEPHONE (OUTPATIENT)
Dept: ORTHOPEDIC SURGERY | Age: 49
End: 2019-02-01

## 2019-02-01 DIAGNOSIS — M17.11 PRIMARY OSTEOARTHRITIS OF RIGHT KNEE: ICD-10-CM

## 2019-02-01 DIAGNOSIS — M17.12 PRIMARY OSTEOARTHRITIS OF LEFT KNEE: Primary | ICD-10-CM

## 2019-03-28 ENCOUNTER — OFFICE VISIT (OUTPATIENT)
Dept: ORTHOPEDIC SURGERY | Age: 49
End: 2019-03-28
Payer: COMMERCIAL

## 2019-03-28 VITALS
SYSTOLIC BLOOD PRESSURE: 164 MMHG | BODY MASS INDEX: 47.09 KG/M2 | RESPIRATION RATE: 16 BRPM | HEART RATE: 81 BPM | WEIGHT: 293 LBS | HEIGHT: 66 IN | DIASTOLIC BLOOD PRESSURE: 79 MMHG

## 2019-03-28 DIAGNOSIS — M79.671 FOOT PAIN, RIGHT: ICD-10-CM

## 2019-03-28 DIAGNOSIS — S93.491A SPRAIN OF ANTERIOR TALOFIBULAR LIGAMENT OF RIGHT ANKLE, INITIAL ENCOUNTER: Primary | ICD-10-CM

## 2019-03-28 PROCEDURE — L1902 AFO ANKLE GAUNTLET PRE OTS: HCPCS | Performed by: ORTHOPAEDIC SURGERY

## 2019-03-28 PROCEDURE — 99214 OFFICE O/P EST MOD 30 MIN: CPT | Performed by: ORTHOPAEDIC SURGERY

## 2019-03-31 PROBLEM — S93.491A SPRAIN OF ANTERIOR TALOFIBULAR LIGAMENT OF RIGHT ANKLE: Status: ACTIVE | Noted: 2019-03-31

## 2020-12-02 LAB — SARS-COV-2: POSITIVE

## 2020-12-04 ENCOUNTER — HOSPITAL ENCOUNTER (INPATIENT)
Age: 50
LOS: 9 days | Discharge: HOME | DRG: 177 | End: 2020-12-13
Attending: EMERGENCY MEDICINE | Admitting: INTERNAL MEDICINE
Payer: COMMERCIAL

## 2020-12-04 ENCOUNTER — APPOINTMENT (OUTPATIENT)
Dept: GENERAL RADIOLOGY | Age: 50
DRG: 177 | End: 2020-12-04
Payer: COMMERCIAL

## 2020-12-04 PROBLEM — J12.82 PNEUMONIA DUE TO COVID-19 VIRUS: Status: ACTIVE | Noted: 2020-12-04

## 2020-12-04 PROBLEM — U07.1 PNEUMONIA DUE TO COVID-19 VIRUS: Status: ACTIVE | Noted: 2020-12-04

## 2020-12-04 LAB
A/G RATIO: 1 (ref 1.1–2.2)
ABO/RH: NORMAL
ALBUMIN SERPL-MCNC: 3.8 G/DL (ref 3.4–5)
ALP BLD-CCNC: 113 U/L (ref 40–129)
ALT SERPL-CCNC: 17 U/L (ref 10–40)
ANION GAP SERPL CALCULATED.3IONS-SCNC: 12 MMOL/L (ref 3–16)
ANION GAP SERPL CALCULATED.3IONS-SCNC: 14 MMOL/L (ref 3–16)
ANTIBODY SCREEN: NORMAL
APTT: 27.6 SEC (ref 24.2–36.2)
AST SERPL-CCNC: 17 U/L (ref 15–37)
BASOPHILS ABSOLUTE: 0 K/UL (ref 0–0.2)
BASOPHILS ABSOLUTE: 0 K/UL (ref 0–0.2)
BASOPHILS RELATIVE PERCENT: 0 %
BASOPHILS RELATIVE PERCENT: 0.3 %
BILIRUB SERPL-MCNC: <0.2 MG/DL (ref 0–1)
BLOOD BANK DISPENSE STATUS: NORMAL
BLOOD BANK PRODUCT CODE: NORMAL
BPU ID: NORMAL
BUN BLDV-MCNC: 15 MG/DL (ref 7–20)
BUN BLDV-MCNC: 17 MG/DL (ref 7–20)
CALCIUM SERPL-MCNC: 8.6 MG/DL (ref 8.3–10.6)
CALCIUM SERPL-MCNC: 8.8 MG/DL (ref 8.3–10.6)
CHLORIDE BLD-SCNC: 105 MMOL/L (ref 99–110)
CHLORIDE BLD-SCNC: 106 MMOL/L (ref 99–110)
CO2: 20 MMOL/L (ref 21–32)
CO2: 24 MMOL/L (ref 21–32)
CREAT SERPL-MCNC: 0.7 MG/DL (ref 0.6–1.1)
CREAT SERPL-MCNC: 0.7 MG/DL (ref 0.6–1.1)
D DIMER: 356 NG/ML DDU (ref 0–229)
DESCRIPTION BLOOD BANK: NORMAL
EKG ATRIAL RATE: 94 BPM
EKG DIAGNOSIS: NORMAL
EKG P AXIS: 59 DEGREES
EKG P-R INTERVAL: 138 MS
EKG Q-T INTERVAL: 376 MS
EKG QRS DURATION: 114 MS
EKG QTC CALCULATION (BAZETT): 470 MS
EKG R AXIS: -32 DEGREES
EKG T AXIS: 73 DEGREES
EKG VENTRICULAR RATE: 94 BPM
EOSINOPHILS ABSOLUTE: 0 K/UL (ref 0–0.6)
EOSINOPHILS ABSOLUTE: 0 K/UL (ref 0–0.6)
EOSINOPHILS RELATIVE PERCENT: 0 %
EOSINOPHILS RELATIVE PERCENT: 0 %
FIBRINOGEN: 587 MG/DL (ref 200–397)
GFR AFRICAN AMERICAN: >60
GFR AFRICAN AMERICAN: >60
GFR NON-AFRICAN AMERICAN: >60
GFR NON-AFRICAN AMERICAN: >60
GLOBULIN: 3.7 G/DL
GLUCOSE BLD-MCNC: 118 MG/DL (ref 70–99)
GLUCOSE BLD-MCNC: 239 MG/DL (ref 70–99)
HCG QUALITATIVE: NEGATIVE
HCT VFR BLD CALC: 41.5 % (ref 36–48)
HCT VFR BLD CALC: 42.9 % (ref 36–48)
HEMOGLOBIN: 13.4 G/DL (ref 12–16)
HEMOGLOBIN: 14 G/DL (ref 12–16)
INR BLD: 1.05 (ref 0.86–1.14)
LACTATE DEHYDROGENASE: 239 U/L (ref 100–190)
LACTIC ACID: 2.8 MMOL/L (ref 0.4–2)
LYMPHOCYTES ABSOLUTE: 0.3 K/UL (ref 1–5.1)
LYMPHOCYTES ABSOLUTE: 0.5 K/UL (ref 1–5.1)
LYMPHOCYTES RELATIVE PERCENT: 5.1 %
LYMPHOCYTES RELATIVE PERCENT: 5.6 %
MCH RBC QN AUTO: 27.9 PG (ref 26–34)
MCH RBC QN AUTO: 28.3 PG (ref 26–34)
MCHC RBC AUTO-ENTMCNC: 32.4 G/DL (ref 31–36)
MCHC RBC AUTO-ENTMCNC: 32.7 G/DL (ref 31–36)
MCV RBC AUTO: 86.4 FL (ref 80–100)
MCV RBC AUTO: 86.6 FL (ref 80–100)
MONOCYTES ABSOLUTE: 0.1 K/UL (ref 0–1.3)
MONOCYTES ABSOLUTE: 0.5 K/UL (ref 0–1.3)
MONOCYTES RELATIVE PERCENT: 2.6 %
MONOCYTES RELATIVE PERCENT: 5.7 %
NEUTROPHILS ABSOLUTE: 5 K/UL (ref 1.7–7.7)
NEUTROPHILS ABSOLUTE: 8.1 K/UL (ref 1.7–7.7)
NEUTROPHILS RELATIVE PERCENT: 88.9 %
NEUTROPHILS RELATIVE PERCENT: 91.8 %
PDW BLD-RTO: 15.2 % (ref 12.4–15.4)
PDW BLD-RTO: 15.3 % (ref 12.4–15.4)
PLATELET # BLD: 216 K/UL (ref 135–450)
PLATELET # BLD: 226 K/UL (ref 135–450)
PMV BLD AUTO: 7.4 FL (ref 5–10.5)
PMV BLD AUTO: 7.9 FL (ref 5–10.5)
POTASSIUM REFLEX MAGNESIUM: 4.2 MMOL/L (ref 3.5–5.1)
POTASSIUM SERPL-SCNC: 3.4 MMOL/L (ref 3.5–5.1)
PRO-BNP: 146 PG/ML (ref 0–124)
PROCALCITONIN: 0.06 NG/ML (ref 0–0.15)
PROTHROMBIN TIME: 12.2 SEC (ref 10–13.2)
RBC # BLD: 4.8 M/UL (ref 4–5.2)
RBC # BLD: 4.96 M/UL (ref 4–5.2)
SODIUM BLD-SCNC: 139 MMOL/L (ref 136–145)
SODIUM BLD-SCNC: 142 MMOL/L (ref 136–145)
TOTAL PROTEIN: 7.5 G/DL (ref 6.4–8.2)
TROPONIN: <0.01 NG/ML
VITAMIN D 25-HYDROXY: 20.2 NG/ML
WBC # BLD: 5.5 K/UL (ref 4–11)
WBC # BLD: 9.1 K/UL (ref 4–11)

## 2020-12-04 PROCEDURE — 93005 ELECTROCARDIOGRAM TRACING: CPT | Performed by: EMERGENCY MEDICINE

## 2020-12-04 PROCEDURE — 93010 ELECTROCARDIOGRAM REPORT: CPT | Performed by: INTERNAL MEDICINE

## 2020-12-04 PROCEDURE — 6360000002 HC RX W HCPCS: Performed by: INTERNAL MEDICINE

## 2020-12-04 PROCEDURE — 6370000000 HC RX 637 (ALT 250 FOR IP): Performed by: INTERNAL MEDICINE

## 2020-12-04 PROCEDURE — 80053 COMPREHEN METABOLIC PANEL: CPT

## 2020-12-04 PROCEDURE — 86850 RBC ANTIBODY SCREEN: CPT

## 2020-12-04 PROCEDURE — 96374 THER/PROPH/DIAG INJ IV PUSH: CPT

## 2020-12-04 PROCEDURE — 83615 LACTATE (LD) (LDH) ENZYME: CPT

## 2020-12-04 PROCEDURE — 2500000003 HC RX 250 WO HCPCS: Performed by: INTERNAL MEDICINE

## 2020-12-04 PROCEDURE — 85610 PROTHROMBIN TIME: CPT

## 2020-12-04 PROCEDURE — 99222 1ST HOSP IP/OBS MODERATE 55: CPT | Performed by: INTERNAL MEDICINE

## 2020-12-04 PROCEDURE — 2580000003 HC RX 258: Performed by: INTERNAL MEDICINE

## 2020-12-04 PROCEDURE — 2700000000 HC OXYGEN THERAPY PER DAY

## 2020-12-04 PROCEDURE — 85025 COMPLETE CBC W/AUTO DIFF WBC: CPT

## 2020-12-04 PROCEDURE — 82728 ASSAY OF FERRITIN: CPT

## 2020-12-04 PROCEDURE — 86900 BLOOD TYPING SEROLOGIC ABO: CPT

## 2020-12-04 PROCEDURE — 83605 ASSAY OF LACTIC ACID: CPT

## 2020-12-04 PROCEDURE — 94640 AIRWAY INHALATION TREATMENT: CPT

## 2020-12-04 PROCEDURE — 85379 FIBRIN DEGRADATION QUANT: CPT

## 2020-12-04 PROCEDURE — 87449 NOS EACH ORGANISM AG IA: CPT

## 2020-12-04 PROCEDURE — 85384 FIBRINOGEN ACTIVITY: CPT

## 2020-12-04 PROCEDURE — 85730 THROMBOPLASTIN TIME PARTIAL: CPT

## 2020-12-04 PROCEDURE — XW033E5 INTRODUCTION OF REMDESIVIR ANTI-INFECTIVE INTO PERIPHERAL VEIN, PERCUTANEOUS APPROACH, NEW TECHNOLOGY GROUP 5: ICD-10-PCS | Performed by: INTERNAL MEDICINE

## 2020-12-04 PROCEDURE — 84145 PROCALCITONIN (PCT): CPT

## 2020-12-04 PROCEDURE — 71045 X-RAY EXAM CHEST 1 VIEW: CPT

## 2020-12-04 PROCEDURE — 6360000002 HC RX W HCPCS: Performed by: PHYSICIAN ASSISTANT

## 2020-12-04 PROCEDURE — XW13325 TRANSFUSION OF CONVALESCENT PLASMA (NONAUTOLOGOUS) INTO PERIPHERAL VEIN, PERCUTANEOUS APPROACH, NEW TECHNOLOGY GROUP 5: ICD-10-PCS | Performed by: INTERNAL MEDICINE

## 2020-12-04 PROCEDURE — 1200000000 HC SEMI PRIVATE

## 2020-12-04 PROCEDURE — 99283 EMERGENCY DEPT VISIT LOW MDM: CPT

## 2020-12-04 PROCEDURE — 82306 VITAMIN D 25 HYDROXY: CPT

## 2020-12-04 PROCEDURE — 84484 ASSAY OF TROPONIN QUANT: CPT

## 2020-12-04 PROCEDURE — 36415 COLL VENOUS BLD VENIPUNCTURE: CPT

## 2020-12-04 PROCEDURE — 86901 BLOOD TYPING SEROLOGIC RH(D): CPT

## 2020-12-04 PROCEDURE — 86140 C-REACTIVE PROTEIN: CPT

## 2020-12-04 PROCEDURE — 83880 ASSAY OF NATRIURETIC PEPTIDE: CPT

## 2020-12-04 PROCEDURE — 84703 CHORIONIC GONADOTROPIN ASSAY: CPT

## 2020-12-04 PROCEDURE — 36430 TRANSFUSION BLD/BLD COMPNT: CPT

## 2020-12-04 PROCEDURE — 94761 N-INVAS EAR/PLS OXIMETRY MLT: CPT

## 2020-12-04 RX ORDER — SODIUM CHLORIDE 0.9 % (FLUSH) 0.9 %
10 SYRINGE (ML) INJECTION PRN
Status: DISCONTINUED | OUTPATIENT
Start: 2020-12-04 | End: 2020-12-13 | Stop reason: HOSPADM

## 2020-12-04 RX ORDER — DEXAMETHASONE SODIUM PHOSPHATE 10 MG/ML
6 INJECTION, SOLUTION INTRAMUSCULAR; INTRAVENOUS ONCE
Status: COMPLETED | OUTPATIENT
Start: 2020-12-04 | End: 2020-12-04

## 2020-12-04 RX ORDER — MONTELUKAST SODIUM 10 MG/1
10 TABLET ORAL NIGHTLY
Status: DISCONTINUED | OUTPATIENT
Start: 2020-12-04 | End: 2020-12-13 | Stop reason: HOSPADM

## 2020-12-04 RX ORDER — IPRATROPIUM BROMIDE AND ALBUTEROL SULFATE 2.5; .5 MG/3ML; MG/3ML
1 SOLUTION RESPIRATORY (INHALATION) EVERY 4 HOURS PRN
Status: DISCONTINUED | OUTPATIENT
Start: 2020-12-04 | End: 2020-12-05

## 2020-12-04 RX ORDER — OXYCODONE AND ACETAMINOPHEN 10; 325 MG/1; MG/1
1 TABLET ORAL EVERY 8 HOURS PRN
Status: DISCONTINUED | OUTPATIENT
Start: 2020-12-04 | End: 2020-12-04 | Stop reason: ALTCHOICE

## 2020-12-04 RX ORDER — PROMETHAZINE HYDROCHLORIDE 25 MG/1
12.5 TABLET ORAL EVERY 6 HOURS PRN
Status: DISCONTINUED | OUTPATIENT
Start: 2020-12-04 | End: 2020-12-13 | Stop reason: HOSPADM

## 2020-12-04 RX ORDER — POLYETHYLENE GLYCOL 3350 17 G/17G
17 POWDER, FOR SOLUTION ORAL DAILY PRN
Status: DISCONTINUED | OUTPATIENT
Start: 2020-12-04 | End: 2020-12-13 | Stop reason: HOSPADM

## 2020-12-04 RX ORDER — PREDNISONE 20 MG/1
40 TABLET ORAL DAILY
Status: DISCONTINUED | OUTPATIENT
Start: 2020-12-07 | End: 2020-12-04

## 2020-12-04 RX ORDER — ONDANSETRON 2 MG/ML
4 INJECTION INTRAMUSCULAR; INTRAVENOUS EVERY 6 HOURS PRN
Status: DISCONTINUED | OUTPATIENT
Start: 2020-12-04 | End: 2020-12-13 | Stop reason: HOSPADM

## 2020-12-04 RX ORDER — HYDROCHLOROTHIAZIDE 25 MG/1
25 TABLET ORAL DAILY
COMMUNITY

## 2020-12-04 RX ORDER — ACETAMINOPHEN 650 MG/1
650 SUPPOSITORY RECTAL EVERY 6 HOURS PRN
Status: DISCONTINUED | OUTPATIENT
Start: 2020-12-04 | End: 2020-12-13 | Stop reason: HOSPADM

## 2020-12-04 RX ORDER — FAMOTIDINE 20 MG/1
20 TABLET, FILM COATED ORAL 2 TIMES DAILY
Status: DISCONTINUED | OUTPATIENT
Start: 2020-12-04 | End: 2020-12-13 | Stop reason: HOSPADM

## 2020-12-04 RX ORDER — METHOCARBAMOL 750 MG/1
750 TABLET, FILM COATED ORAL 3 TIMES DAILY PRN
COMMUNITY

## 2020-12-04 RX ORDER — SODIUM CHLORIDE 0.9 % (FLUSH) 0.9 %
10 SYRINGE (ML) INJECTION EVERY 12 HOURS SCHEDULED
Status: DISCONTINUED | OUTPATIENT
Start: 2020-12-04 | End: 2020-12-13 | Stop reason: HOSPADM

## 2020-12-04 RX ORDER — 0.9 % SODIUM CHLORIDE 0.9 %
20 INTRAVENOUS SOLUTION INTRAVENOUS ONCE
Status: COMPLETED | OUTPATIENT
Start: 2020-12-04 | End: 2020-12-04

## 2020-12-04 RX ORDER — PHENTERMINE HYDROCHLORIDE 37.5 MG/1
37.5 CAPSULE ORAL EVERY MORNING
COMMUNITY
End: 2021-09-30

## 2020-12-04 RX ORDER — BUDESONIDE AND FORMOTEROL FUMARATE DIHYDRATE 160; 4.5 UG/1; UG/1
2 AEROSOL RESPIRATORY (INHALATION) 2 TIMES DAILY
Status: DISCONTINUED | OUTPATIENT
Start: 2020-12-04 | End: 2020-12-13 | Stop reason: HOSPADM

## 2020-12-04 RX ORDER — DEXAMETHASONE SODIUM PHOSPHATE 10 MG/ML
20 INJECTION, SOLUTION INTRAMUSCULAR; INTRAVENOUS EVERY 24 HOURS
Status: DISCONTINUED | OUTPATIENT
Start: 2020-12-05 | End: 2020-12-04 | Stop reason: SDUPTHER

## 2020-12-04 RX ORDER — IPRATROPIUM BROMIDE AND ALBUTEROL SULFATE 2.5; .5 MG/3ML; MG/3ML
1 SOLUTION RESPIRATORY (INHALATION) ONCE
Status: DISCONTINUED | OUTPATIENT
Start: 2020-12-04 | End: 2020-12-04

## 2020-12-04 RX ORDER — IPRATROPIUM BROMIDE AND ALBUTEROL SULFATE 2.5; .5 MG/3ML; MG/3ML
1 SOLUTION RESPIRATORY (INHALATION) 4 TIMES DAILY
Status: DISCONTINUED | OUTPATIENT
Start: 2020-12-04 | End: 2020-12-04

## 2020-12-04 RX ORDER — AZITHROMYCIN 250 MG/1
250 TABLET, FILM COATED ORAL DAILY
Status: ON HOLD | COMMUNITY
End: 2020-12-13 | Stop reason: HOSPADM

## 2020-12-04 RX ORDER — METHYLPREDNISOLONE SODIUM SUCCINATE 40 MG/ML
40 INJECTION, POWDER, LYOPHILIZED, FOR SOLUTION INTRAMUSCULAR; INTRAVENOUS EVERY 6 HOURS
Status: DISCONTINUED | OUTPATIENT
Start: 2020-12-04 | End: 2020-12-04

## 2020-12-04 RX ORDER — ALBUTEROL SULFATE 90 UG/1
2 AEROSOL, METERED RESPIRATORY (INHALATION) 4 TIMES DAILY
Status: DISCONTINUED | OUTPATIENT
Start: 2020-12-04 | End: 2020-12-07

## 2020-12-04 RX ORDER — ACETAMINOPHEN 325 MG/1
650 TABLET ORAL EVERY 6 HOURS PRN
Status: DISCONTINUED | OUTPATIENT
Start: 2020-12-04 | End: 2020-12-13 | Stop reason: HOSPADM

## 2020-12-04 RX ORDER — 0.9 % SODIUM CHLORIDE 0.9 %
30 INTRAVENOUS SOLUTION INTRAVENOUS PRN
Status: DISCONTINUED | OUTPATIENT
Start: 2020-12-04 | End: 2020-12-13 | Stop reason: HOSPADM

## 2020-12-04 RX ORDER — HYDROCHLOROTHIAZIDE 25 MG/1
25 TABLET ORAL DAILY
Status: DISCONTINUED | OUTPATIENT
Start: 2020-12-04 | End: 2020-12-04 | Stop reason: ALTCHOICE

## 2020-12-04 RX ORDER — GUAIFENESIN/DEXTROMETHORPHAN 100-10MG/5
5 SYRUP ORAL EVERY 4 HOURS PRN
Status: DISCONTINUED | OUTPATIENT
Start: 2020-12-04 | End: 2020-12-13 | Stop reason: HOSPADM

## 2020-12-04 RX ADMIN — Medication 2 PUFF: at 20:14

## 2020-12-04 RX ADMIN — DEXAMETHASONE SODIUM PHOSPHATE 6 MG: 10 INJECTION, SOLUTION INTRAMUSCULAR; INTRAVENOUS at 11:46

## 2020-12-04 RX ADMIN — GUAIFENESIN AND DEXTROMETHORPHAN 5 ML: 100; 10 SYRUP ORAL at 17:15

## 2020-12-04 RX ADMIN — REMDESIVIR 200 MG: 100 INJECTION, POWDER, LYOPHILIZED, FOR SOLUTION INTRAVENOUS at 20:11

## 2020-12-04 RX ADMIN — ENOXAPARIN SODIUM 40 MG: 40 INJECTION SUBCUTANEOUS at 20:10

## 2020-12-04 RX ADMIN — Medication 2 PUFF: at 17:15

## 2020-12-04 RX ADMIN — CHOLECALCIFEROL TAB 125 MCG (5000 UNIT) 5000 UNITS: 125 TAB at 20:10

## 2020-12-04 RX ADMIN — GUAIFENESIN AND DEXTROMETHORPHAN 5 ML: 100; 10 SYRUP ORAL at 22:20

## 2020-12-04 RX ADMIN — Medication 10 ML: at 20:18

## 2020-12-04 RX ADMIN — MONTELUKAST SODIUM 10 MG: 10 TABLET, COATED ORAL at 20:10

## 2020-12-04 RX ADMIN — SODIUM CHLORIDE 20 ML: 9 INJECTION, SOLUTION INTRAVENOUS at 18:10

## 2020-12-04 RX ADMIN — Medication 2 PUFF: at 20:13

## 2020-12-04 RX ADMIN — FAMOTIDINE 20 MG: 20 TABLET ORAL at 20:10

## 2020-12-04 RX ADMIN — AZITHROMYCIN MONOHYDRATE 500 MG: 500 INJECTION, POWDER, LYOPHILIZED, FOR SOLUTION INTRAVENOUS at 17:18

## 2020-12-04 RX ADMIN — CEFTRIAXONE 2 G: 2 INJECTION, POWDER, FOR SOLUTION INTRAMUSCULAR; INTRAVENOUS at 17:17

## 2020-12-04 ASSESSMENT — ENCOUNTER SYMPTOMS
DIARRHEA: 0
BACK PAIN: 0
COUGH: 1
ABDOMINAL PAIN: 0
CONSTIPATION: 0
CHEST TIGHTNESS: 1
SHORTNESS OF BREATH: 1
COLOR CHANGE: 0
WHEEZING: 0
VOMITING: 0
NAUSEA: 0

## 2020-12-04 NOTE — ED PROVIDER NOTES
[facility]        Pt Name: Ned Romero  MRN: [id number]  [place] 1970  Date of evaluation: 12/4/2020  Provider: FABIO Olmedo  PCP: Janet Thomason MD  ED Attending: Leydi Beck MD     I have seen and evaluated this patient with my supervising physician Leydi Beck MD.    [facility]       Chief Complaint   Patient presents with    Shortness of Breath     Pt is COVID + and has asthma. Pt reports \"unable to get my chest to open up\" even after breathing treatments and inhalers. 85 RA       HISTORY OF PRESENT ILLNESS   (Location, Timing/Onset, Context/Setting, Quality, Duration, Modifying Factors, Severity, Associated Signs and Symptoms)  Note limiting factors. Ned Romero is a 53 y.o. female who presents to the emergency department with complaints of known Covid illness. Having difficulty with worsening of symptoms. On arrival 85% on room air. History of asthma and home nebulizer treatments working mildly. Does not usually wheeze though. Patient had symptoms started this past Saturday and on Monday was tested for Covid and received positive testing. Patient's daughters are also positive at home. Patient denies any fevers, has had cough nonproductive. No aggravating or alleviating factors. No home oxygen use. Patient is a non-smoker. Denies headache, lightheadedness or dizziness. Patient is morbidly obese. Nursing Notes were all reviewed and agreed with or any disagreements were addressed in the HPI. REVIEW OF SYSTEMS    (2-9 systems for level 4, 10 or more for level 5)     Review of Systems   Constitutional: Negative for chills, fatigue and fever. Respiratory: Positive for cough, chest tightness and shortness of breath. Negative for wheezing. Cardiovascular: Negative for chest pain.    Gastrointestinal: Negative for abdominal pain, constipation, diarrhea, nausea and vomiting. Genitourinary: Negative for difficulty urinating, dysuria and flank pain. Musculoskeletal: Positive for myalgias. Negative for back pain and neck pain. Skin: Negative for color change and rash. Neurological: Negative for dizziness, weakness, light-headedness and numbness. All other systems reviewed and are negative. Positives and Pertinent negatives as per HPI. Except as noted above in the ROS, all other systems were reviewed and negative. PAST MEDICAL HISTORY     Past Medical History:   Diagnosis Date    Asthma          SURGICAL HISTORY     Past Surgical History:   Procedure Laterality Date    CARPAL TUNNEL RELEASE      2 ON EACH SIDE    HERNIA REPAIR      X2    HYSTERECTOMY           CURRENTMEDICATIONS       Previous Medications    ALBUTEROL (PROVENTIL HFA;VENTOLIN HFA) 108 (90 BASE) MCG/ACT INHALER    Inhale 2 puffs into the lungs every 6 hours as needed for Wheezing. AZITHROMYCIN (ZITHROMAX) 250 MG TABLET    Take 250 mg by mouth daily    BUDESONIDE-FORMOTEROL (SYMBICORT) 160-4.5 MCG/ACT AERO    INHALE TWO PUFFS BY MOUTH TWICE A DAY    CETIRIZINE HCL 10 MG CAPS    Take by mouth    HYDROCHLOROTHIAZIDE (HYDRODIURIL) 25 MG TABLET    Take 25 mg by mouth daily    IBUPROFEN (ADVIL;MOTRIN) 800 MG TABLET    Take 800 mg by mouth every 6 hours as needed for Pain    METHOCARBAMOL (ROBAXIN) 750 MG TABLET    Take 750 mg by mouth 4 times daily    MOMETASONE-FORMOTEROL (DULERA) 200-5 MCG/ACT INHALER    Inhale 2 puffs into the lungs every 12 hours. MONTELUKAST (SINGULAIR) 10 MG TABLET    Take 10 mg by mouth nightly. NONFORMULARY    Diet pill    OXYCODONE-ACETAMINOPHEN (PERCOCET)  MG PER TABLET        PHENTERMINE 37.5 MG CAPSULE    Take 37.5 mg by mouth every morning. PREDNISONE (DELTASONE) 20 MG TABLET             ALLERGIES     Penicillins    FAMILYHISTORY     History reviewed. No pertinent family history.        SOCIAL HISTORY       Social History     Tobacco Use    Smoking status: Never Smoker    Smokeless tobacco: Never Used   Substance Use Topics    Alcohol use: Yes     Comment: rarely    Drug use: No       SCREENINGS             PHYSICAL EXAM    (up to 7 for level 4, 8 or more for level 5)     ED Triage Vitals [12/04/20 1034]   BP Temp Temp src Pulse Resp SpO2 Height Weight   112/73 97.7 °F (36.5 °C) -- 92 (!) 34 (!) 85 % 5' 6.5\" (1.689 m) (!) 336 lb (152.4 kg)       Physical Exam  Vitals signs and nursing note reviewed. Constitutional:       Appearance: Normal appearance. She is well-developed. She is not toxic-appearing or diaphoretic. HENT:      Head: Normocephalic. Right Ear: External ear normal.      Left Ear: External ear normal.      Nose: Nose normal.   Eyes:      General:         Right eye: No discharge. Left eye: No discharge. Conjunctiva/sclera: Conjunctivae normal.   Neck:      Musculoskeletal: Normal range of motion and neck supple. Cardiovascular:      Rate and Rhythm: Normal rate and regular rhythm. Heart sounds: Normal heart sounds. No murmur. No friction rub. No gallop. Pulmonary:      Effort: Pulmonary effort is normal. No respiratory distress. Breath sounds: Decreased breath sounds (diffuse) present. No wheezing, rhonchi or rales. Musculoskeletal: Normal range of motion. Skin:     General: Skin is warm and dry. Coloration: Skin is not pale. Neurological:      Mental Status: She is alert and oriented to person, place, and time. Psychiatric:         Behavior: Behavior normal. Behavior is cooperative.          DIAGNOSTIC RESULTS   LABS:    Labs Reviewed   BASIC METABOLIC PANEL - Abnormal; Notable for the following components:       Result Value    Potassium 3.4 (*)     Glucose 118 (*)     All other components within normal limits    Narrative:     Performed at:  [facility] [address], [place], [facility] [address]   Phone [phone]   BRAIN NATRIURETIC PEPTIDE - Abnormal; Notable for the following components:    Pro- (*)     All other components within normal limits    Narrative:     Performed at:  [facility]  [address]  [place], [place] [facility]   Phone [phone]   CBC WITH AUTO DIFFERENTIAL - Abnormal; Notable for the following components:    Neutrophils Absolute 8.1 (*)     Lymphocytes Absolute 0.5 (*)     All other components within normal limits    Narrative:     Performed at:  [facility]  [address], [address] [facility]   Phone [phone]   HCG, SERUM, QUALITATIVE    Narrative:     Performed at:  [facility] [address],  [place], [address] [facility]   Phone [phone]   TROPONIN    Narrative:     Performed at:  [facility] [address] [place],  [place], [address] [facility]   Phone [phone]   VITAMIN D 25 HYDROXY   LACTIC ACID, PLASMA       All other labs were within normal range or not returned as of this dictation. EKG: All EKG's are interpreted by the Emergency Department Physician in the absence of a cardiologist.  Please see their note for interpretation of EKG. RADIOLOGY:   Non-plain film images such as CT, Ultrasound and MRI are read by the radiologist. Plain radiographic images are visualized and preliminarily interpreted by the ED Provider with the below findings:        Interpretation per the Radiologist below, if available at the time of this note:    XR CHEST PORTABLE   Final Result   Extensive bilateral pulmonary disease suspicious for COVID/viral pneumonia. Xr Chest Portable    Result Date: 12/4/2020  EXAMINATION: ONE XRAY VIEW OF THE CHEST 12/4/2020 11:14 am COMPARISON: January 3, 2019 HISTORY: ORDERING SYSTEM PROVIDED HISTORY: cough TECHNOLOGIST PROVIDED HISTORY: Reason for exam:->cough Reason for Exam: Shortness of Breath (Pt is COVID + and has asthma.  Pt reports \"unable to get my chest to open up\" even pneumonia. Remainder of vitals are currently stable. Discussed with hospitalist for admission. The patient tolerated their visit well. They were seen and evaluated by the attending physician, who agreed with the assessment and plan. I have discussed the findings of today's workup with the patient and addressed the patient's questions and concerns. FINAL IMPRESSION      1. Pneumonia due to COVID-19 virus    2. Acute respiratory disease due to 2019 novel coronavirus    3. Hypoxia          DISPOSITION/PLAN   DISPOSITION Decision To Admit 12/04/2020 12:54:49 PM      PATIENT REFERREDTO:  No follow-up provider specified.     DISCHARGE MEDICATIONS:  New Prescriptions    No medications on file       DISCONTINUED MEDICATIONS:  Discontinued Medications    No medications on file              (Please note that portions of this note were completed with a voice recognition program.  Efforts were made to edit the dictations but occasionally words are mis-transcribed.)    FABIO Whitfield (electronically signed)        FABIO Novoa  12/04/20 1255       FABIO Mcclure  12/04/20 1521

## 2020-12-04 NOTE — PROGRESS NOTES
Pt admitted to room 3311 from ED. Vital signs obtained. Pt on 5L O2 via nasal cannula. Pt on IV abx and steroid. Pt oriented to room and updated on POC. Pt understands and has no further questions. Call light and bedside table within reach. Safety socks on and bed in lowest position with 2/4 siderails up. Will continue to monitor.

## 2020-12-04 NOTE — ED NOTES
Report called and given to 3A RN. No questions at this time. Pt alert, no sign of distress at time of report.  Receiving RN to ER bedside when ready     Tien Wolfe RN  12/04/20 8822

## 2020-12-04 NOTE — CONSULTS
[facility] Pulmonary and Critical Care   Consult Note      Reason for Consult: Acute hypoxic respiratory failure/COVID-19 pneumonia  Requesting Physician: Juma Echeverria    Subjective:   CHIEF COMPLAINT: Shortness of breath and cough     HPI: Patient states that she has been feeling unwell with flulike symptoms for the last 1 week-head and chest cold, fevers and cough with mucoid phlegm. Her daughter tested positive for COVID-19 and subsequently patient was also noted to be +2 days ago. Presented to the hospital due to worsening dyspnea and has now been requiring 5 L O2. Pulmonary consultation has been requested. Non-smoker.   History of asthma-on Symbicort 160, Singulair 10 mg, albuterol as needed       The patient is a 53 y.o. female with significant past medical history of:      Diagnosis Date    Asthma         Past Surgical History:        Procedure Laterality Date    CARPAL TUNNEL RELEASE      2 ON EACH SIDE    HERNIA REPAIR      X2    HYSTERECTOMY       Current Medications:    Current Facility-Administered Medications: budesonide-formoterol (SYMBICORT) 160-4.5 MCG/ACT inhaler 2 puff, 2 puff, Inhalation, BID  montelukast (SINGULAIR) tablet 10 mg, 10 mg, Oral, Nightly  sodium chloride flush 0.9 % injection 10 mL, 10 mL, Intravenous, 2 times per day  sodium chloride flush 0.9 % injection 10 mL, 10 mL, Intravenous, PRN  acetaminophen (TYLENOL) tablet 650 mg, 650 mg, Oral, Q6H PRN **OR** acetaminophen (TYLENOL) suppository 650 mg, 650 mg, Rectal, Q6H PRN  polyethylene glycol (GLYCOLAX) packet 17 g, 17 g, Oral, Daily PRN  promethazine (PHENERGAN) tablet 12.5 mg, 12.5 mg, Oral, Q6H PRN **OR** ondansetron (ZOFRAN) injection 4 mg, 4 mg, Intravenous, Q6H PRN  famotidine (PEPCID) tablet 20 mg, 20 mg, Oral, BID  vitamin D3 (CHOLECALCIFEROL) tablet 5,000 Units, 5,000 Units, Oral, Daily  cefTRIAXone (ROCEPHIN) 2 g IVPB in D5W 50ml minibag, 2 g, Intravenous, Q24H  azithromycin (ZITHROMAX) 500 mg in D5W 250ml Vial Mate, 500 mg, Intravenous, Q24H  guaiFENesin-dextromethorphan (ROBITUSSIN DM) 100-10 MG/5ML syrup 5 mL, 5 mL, Oral, Q4H PRN  albuterol sulfate  (90 Base) MCG/ACT inhaler 2 puff, 2 puff, Inhalation, 4x Daily  ipratropium (ATROVENT HFA) 17 MCG/ACT inhaler 2 puff, 2 puff, Inhalation, 4x daily  ipratropium-albuterol (DUONEB) nebulizer solution 1 ampule, 1 ampule, Inhalation, Q4H PRN  dexamethasone (DECADRON) 20 mg in sodium chloride 0.9 % IVPB, 20 mg, Intravenous, Daily  0.9 % sodium chloride bolus, 20 mL, Intravenous, Once  enoxaparin (LOVENOX) injection 40 mg, 40 mg, Subcutaneous, BID    Allergies   Allergen Reactions    Penicillins Rash       Social History:    TOBACCO:   reports that she has never smoked. She has never used smokeless tobacco.  ETOH:   reports current alcohol use. Patient currently lives independently    Family History:   History reviewed. No pertinent family history.     REVIEW OF SYSTEMS:    Constitutional: negative for fatigue, fevers, malaise and weight loss  Ears, nose, mouth, throat: negative for ear drainage, epistaxis, hoarseness, nasal congestion, sore throat and voice change  Respiratory: negative except for cough, shortness of breath and wheezing  Cardiovascular: negative for chest pain, chest pressure/discomfort, irregular heart beat, lower extremity edema and palpitations  Gastrointestinal: negative for abdominal pain, constipation, diarrhea, jaundice, melena, odynophagia, reflux symptoms and vomiting  Hematologic/lymphatic: negative for bleeding, easy bruising, lymphadenopathy and petechiae  Musculoskeletal:negative for arthralgias, bone pain, muscle weakness, neck pain and stiff joints  Neurological: negative for dizziness, gait problems, headaches, seizures, speech problems, tremors and weakness  Behavioral/Psych: negative for anxiety, behavior problems, depression, fatigue and sleep disturbance  Endocrine: negative for diabetic symptoms including none, neuropathy, polyphagia, polyuria, HISTORY:    Reason for exam:->cough    Reason for Exam: Shortness of Breath (Pt is COVID + and has asthma. Pt    reports \"unable to get my chest to open up\" even after breathing treatments    and inhalers. 85 RA)    Acuity: Acute    Type of Exam: Initial         FINDINGS:    Ill-defined multifocal airspace disease is present with relative sparing of    the lung apices.  No evidence of pneumothorax or pleural effusion or acute    process of the cardiac or mediastinal structures              Impression    Extensive bilateral pulmonary disease suspicious for COVID/viral pneumonia. Problem List:   Acute hypoxic respiratory failure  COVID-19 pneumonia    Assessment/Plan:     Reviewed patient's chest x-ray which shows bilateral infiltrates consistent with COVID-19 pneumonia. Patient is currently requiring 5 L O2-closely monitor. Increased dose of Decadron to 20 mg daily. Patient will also be started on remdesivir and will have 1 unit of convalescent plasma administered. D-dimer is awaited. Lovenox 40 mg twice daily for prophylaxis. Pulmonary will continue to follow.     Tiffanie Louis MD

## 2020-12-04 NOTE — CARE COORDINATION
I have discussed with the patient the rationale for blood transfusion, its benefits in treating or preventing COVID 19 which could lead to fatigue, organ damage or death, and its risk which include: mild transfusion reactions, rare risk of blood borne infection, or more serious but rare allergic reactions. I have discussed the alternatives to transfusion, including the risk and consequences of not receiving transfusion. The patient had an opportunity to ask questions and had agreed to proceed with transfusion of CONVALESCENT PLASMA.

## 2020-12-04 NOTE — ED PROVIDER NOTES
components within normal limits    Narrative:     Performed at:  [facility]  [address], [employer]   Phone [phone]   CBC WITH AUTO DIFFERENTIAL - Abnormal; Notable for the following components:    Lymphocytes Absolute 0.3 (*)     All other components within normal limits    Narrative:     Performed at:  [facility]  [address], [employer]   Phone [phone]   COMPREHENSIVE METABOLIC PANEL W/ REFLEX TO MG FOR LOW K - Abnormal; Notable for the following components:    CO2 20 (*)     Glucose 239 (*)     Albumin/Globulin Ratio 1.0 (*)     All other components within normal limits    Narrative:     Performed at:  [facility]  [address], [employer]   Phone [phone]   FIBRINOGEN - Abnormal; Notable for the following components:    Fibrinogen 587 (*)     All other components within normal limits    Narrative:     Performed at:  [facility]  [address], [employer]   Phone [phone]   C-REACTIVE PROTEIN - Abnormal; Notable for the following components:    CRP 99.0 (*)     All other components within normal limits    Narrative:     Performed at:  [facility]  [address] [employer] [address] [place] [address]   Phone [phone]   LACTATE DEHYDROGENASE - Abnormal; Notable for the following components:     (*)     All other components within normal limits    Narrative:     Performed at:  [facility]  [address], [employer]   Phone [phone] - Abnormal; Notable for the following components:    Ferritin 485.7 (*)     All other components within normal limits    Narrative:     Performed at:  [facility]  [address] [place] [address]   Phone [phone]   D-DIMER, QUANTITATIVE - Abnormal; Notable for the following components:    D-Dimer, Quant 356 (*)     All other components within normal limits    Narrative:     Performed at:  [facility] [address]   Phone [phone]   APTT - Abnormal; Notable for the following components:    aPTT 24.1 (*)     All other components within normal limits    Narrative:     Performed at:  [facility] [address], [facility] [place]   Phone [phone]   CBC WITH AUTO DIFFERENTIAL - Abnormal; Notable for the following components:    Lymphocytes Absolute 0.6 (*)     All other components within normal limits    Narrative:     Performed at:  [facility] [address] [facility] [place]   Phone [phone]   COMPREHENSIVE METABOLIC PANEL W/ REFLEX TO MG FOR LOW K - Abnormal; Notable for the following components:    Glucose 127 (*)     BUN 23 (*)     Albumin/Globulin Ratio 0.9 (*)     All other components within normal limits    Narrative:     Performed at:  [facility] [address], [facility] [place]   Phone [phone]   FIBRINOGEN - Abnormal; Notable for the following components:    Fibrinogen 493 (*)     All other components within normal limits    Narrative:     Performed at:  [facility] [address], [place]   Phone [phone]   D-DIMER, QUANTITATIVE - Abnormal; Notable for the following components:    D-Dimer, Quant 247 (*)     All other components within normal limits    Narrative:     Performed at:  [facility] [address]   Phone [phone]   LEGIONELLA ANTIGEN, URINE    Narrative:     ORDER#: [id number]                          ORDERED BY: Ted An  SOURCE: Urine Clean Catch                  COLLECTED:  12/04/20 21:00  ANTIBIOTICS AT BOBBY.: RECEIVED :  12/05/20 05:43  Performed at:  [facility]  [address], [facility] [employer] [address]   Phone [phone]   STREP PNEUMONIAE ANTIGEN    Narrative:     ORDER#: [id number]                          ORDERED BY: Chandra Lindo  SOURCE: Urine Clean Catch                  COLLECTED:  12/04/20 21:00  ANTIBIOTICS AT BOBBY.:                      RECEIVED :  12/05/20 05:43  Performed at:  [facility]  [address] [facility] [employer] [address]   Phone [phone]   CULTURE, RESPIRATORY   HCG, SERUM, QUALITATIVE    Narrative:     Performed at:  [facility]  [address] [place],  [place], [address]   Phone [phone]   TROPONIN    Narrative:     Performed at:  [facility]  [address] [place],  [place], [address]   Phone [phone]   PROTIME-INR    Narrative:     Performed at:  [facility] [address],  [place], [address]   Phone [phone]   APTT    Narrative:     Performed at:  [facility]  [address] [place],  [place], [address]   Phone [phone]   PROCALCITONIN    Narrative:     Performed at:  [facility]  [address],  [place], [address]   Phone [phone]   PROTIME-INR    Narrative:     Performed at:  [facility]  [address],  [place], [address]   Phone [phone]   APTT    Narrative:     Performed at:  [facility]  [address] [place],  [place], [address]   Phone [phone]   CBC WITH AUTO DIFFERENTIAL   COMPREHENSIVE METABOLIC PANEL W/ REFLEX TO MG FOR LOW K   PROTIME-INR   APTT   FIBRINOGEN   D-DIMER, QUANTITATIVE   TYPE AND SCREEN    Narrative:     Performed at:  [facility]  [address],  [place], [address]   Phone [phone] PREPARE COVID-19 CONVALESCENT PLASMA           EKG:    Sinus rhythm at a rate of 94 beats a minute with no acute ST elevations or depressions or pathologic Q waves. Exam:    Well-nourished female with oxygen on board she appeared to be more comfortable she was in no acute distress. Chest showed some decreased breath sounds bilaterally. Medical decision makin-year-old female presents with COVID-19 and hypoxia. The patient is in respiratory failure. The patient be admitted for further care. FINAL IMPRESSION:    1. Pneumonia due to COVID-19 virus    2. Acute respiratory disease due to 2019 novel coronavirus    3. Hypoxia        Critical CARE time: 45 minutes of critical care time spent with this patient.      Yosi Saucedo MD  20 6638 Red Elizabeth MD  20 [id number]

## 2020-12-04 NOTE — H&P
Hospital Medicine History & Physical      PCP: Alvaro Travis MD    Date of Admission: 12/4/2020    Date of Service: Pt seen/examined on 12/4/2020 and Admitted to Inpatient with expected LOS greater than two midnights due to medical therapy. Chief Complaint: Dyspnea      History Of Present Illness: 53 y.o. female with past medical history of severe persistent asthma and morbid obesity presents for evaluation of dyspnea. Gradually started on Saturday has been getting worse over this period of time. Initially thought it was asthma exacerbation. Worse with exertion. Got tested positive for COVID-19 on Wednesday. Denies orthopnea, PND, fevers, chills, nausea, vomiting, diarrhea. ADD patient saturating 85% on room air which corrects to low 90s on 5 L per nasal cannula. Hospitalist consulted for admission. Past Medical History:          Diagnosis Date    Asthma        Past Surgical History:          Procedure Laterality Date    CARPAL TUNNEL RELEASE      2 ON EACH SIDE    HERNIA REPAIR      X2    HYSTERECTOMY         Medications Prior to Admission:      Prior to Admission medications    Medication Sig Start Date End Date Taking? Authorizing Provider   phentermine 37.5 MG capsule Take 37.5 mg by mouth every morning.    Yes Historical Provider, MD   azithromycin (ZITHROMAX) 250 MG tablet Take 250 mg by mouth daily   Yes Historical Provider, MD   methocarbamol (ROBAXIN) 750 MG tablet Take 750 mg by mouth 4 times daily   Yes Historical Provider, MD   hydroCHLOROthiazide (HYDRODIURIL) 25 MG tablet Take 25 mg by mouth daily   Yes Historical Provider, MD   Cetirizine HCl 10 MG CAPS Take by mouth   Yes Historical Provider, MD   budesonide-formoterol (SYMBICORT) 160-4.5 MCG/ACT AERO INHALE TWO PUFFS BY MOUTH TWICE A DAY 6/18/18  Yes Historical Provider, MD   oxyCODONE-acetaminophen (PERCOCET)  MG per tablet  7/2/15  Yes Historical Provider, MD   predniSONE (DELTASONE) 20 MG tablet  2/9/15 Yes Historical Provider, MD   montelukast (SINGULAIR) 10 MG tablet Take 10 mg by mouth nightly. Yes Historical Provider, MD   albuterol (PROVENTIL HFA;VENTOLIN HFA) 108 (90 BASE) MCG/ACT inhaler Inhale 2 puffs into the lungs every 6 hours as needed for Wheezing. Yes Historical Provider, MD   ibuprofen (ADVIL;MOTRIN) 800 MG tablet Take 800 mg by mouth every 6 hours as needed for Pain    Historical Provider, MD   NONFORMULARY Diet pill    Historical Provider, MD   mometasone-formoterol (DULERA) 200-5 MCG/ACT inhaler Inhale 2 puffs into the lungs every 12 hours. Historical Provider, MD       Allergies:  Penicillins    Social History:      The patient currently lives home    TOBACCO:   reports that she has never smoked. She has never used smokeless tobacco.  ETOH:   reports current alcohol use. E-Cigarettes Vaping or Juuling     Questions Responses    Vaping Use Never User    Start Date     Does device contain nicotine? Quit Date     Vaping Type             Family History:       Reviewed in detail and negative for DM, CAD, Cancer, CVA. Positive as follows:    History reviewed. No pertinent family history. REVIEW OF SYSTEMS:   Pertinent positives as noted in the HPI. All other systems reviewed and negative. PHYSICAL EXAM PERFORMED:    BP (!) 151/63   Pulse 92   Temp 97.7 °F (36.5 °C)   Resp (!) 34   Ht 5' 6.5\" (1.689 m)   Wt (!) 336 lb (152.4 kg)   SpO2 95%   BMI 53.42 kg/m²     Physical Exam  Vitals signs and nursing note reviewed. Constitutional:       General: She is not in acute distress. Appearance: She is obese. She is not ill-appearing, toxic-appearing or diaphoretic. HENT:      Head: Atraumatic. Nose: Nose normal. No congestion. Mouth/Throat:      Mouth: Mucous membranes are moist.   Eyes:      General: No scleral icterus. Extraocular Movements: Extraocular movements intact. Pupils: Pupils are equal, round, and reactive to light.    Neck:      Musculoskeletal: No neck rigidity or muscular tenderness. Vascular: No carotid bruit. Cardiovascular:      Rate and Rhythm: Normal rate and regular rhythm. Pulses: Normal pulses. Heart sounds: Normal heart sounds. No murmur. No friction rub. No gallop. Pulmonary:      Effort: Pulmonary effort is normal. No respiratory distress. Breath sounds: Normal breath sounds. No stridor. No wheezing, rhonchi or rales. Chest:      Chest wall: No tenderness. Abdominal:      General: Bowel sounds are normal. There is no distension. Palpations: Abdomen is soft. Tenderness: There is no abdominal tenderness. There is no guarding. Musculoskeletal: Normal range of motion. General: No tenderness or signs of injury. Right lower leg: No edema. Left lower leg: No edema. Lymphadenopathy:      Cervical: No cervical adenopathy. Skin:     General: Skin is warm and dry. Coloration: Skin is not jaundiced or pale. Neurological:      General: No focal deficit present. Mental Status: She is alert and oriented to person, place, and time. Cranial Nerves: No cranial nerve deficit. Psychiatric:         Mood and Affect: Mood normal.         Behavior: Behavior normal.         Thought Content: Thought content normal.           Labs:     Recent Labs     12/04/20  1122   WBC 9.1   HGB 14.0   HCT 42.9        Recent Labs     12/04/20  1122      K 3.4*      CO2 24   BUN 17   CREATININE 0.7   CALCIUM 8.8     No results for input(s): AST, ALT, BILIDIR, BILITOT, ALKPHOS in the last 72 hours. No results for input(s): INR in the last 72 hours.   Recent Labs     12/04/20  1122   TROPONINI <0.01       Urinalysis:      Lab Results   Component Value Date    NITRU Negative 12/06/2014    WBCUA 20-50 12/06/2014    BACTERIA 3+ 12/06/2014    RBCUA 0-2 12/06/2014    BLOODU SMALL 12/06/2014    SPECGRAV 1.015 12/06/2014    GLUCOSEU Negative 12/06/2014       Radiology:     CXR: I have reviewed the

## 2020-12-05 LAB
APTT: 24.1 SEC (ref 24.2–36.2)
C-REACTIVE PROTEIN: 99 MG/L (ref 0–5.1)
FERRITIN: 485.7 NG/ML (ref 15–150)
L. PNEUMOPHILA SEROGP 1 UR AG: NORMAL

## 2020-12-05 PROCEDURE — 6370000000 HC RX 637 (ALT 250 FOR IP): Performed by: INTERNAL MEDICINE

## 2020-12-05 PROCEDURE — 36415 COLL VENOUS BLD VENIPUNCTURE: CPT

## 2020-12-05 PROCEDURE — 6360000002 HC RX W HCPCS: Performed by: INTERNAL MEDICINE

## 2020-12-05 PROCEDURE — 99233 SBSQ HOSP IP/OBS HIGH 50: CPT | Performed by: INTERNAL MEDICINE

## 2020-12-05 PROCEDURE — 1200000000 HC SEMI PRIVATE

## 2020-12-05 PROCEDURE — 94640 AIRWAY INHALATION TREATMENT: CPT

## 2020-12-05 PROCEDURE — 2700000000 HC OXYGEN THERAPY PER DAY

## 2020-12-05 PROCEDURE — 2580000003 HC RX 258: Performed by: INTERNAL MEDICINE

## 2020-12-05 PROCEDURE — 85730 THROMBOPLASTIN TIME PARTIAL: CPT

## 2020-12-05 PROCEDURE — 94761 N-INVAS EAR/PLS OXIMETRY MLT: CPT

## 2020-12-05 PROCEDURE — 2500000003 HC RX 250 WO HCPCS: Performed by: INTERNAL MEDICINE

## 2020-12-05 RX ORDER — ALBUTEROL SULFATE 90 UG/1
2 AEROSOL, METERED RESPIRATORY (INHALATION) EVERY 4 HOURS PRN
Status: DISCONTINUED | OUTPATIENT
Start: 2020-12-05 | End: 2020-12-13 | Stop reason: HOSPADM

## 2020-12-05 RX ADMIN — Medication 2 PUFF: at 19:23

## 2020-12-05 RX ADMIN — Medication 2 PUFF: at 19:22

## 2020-12-05 RX ADMIN — GUAIFENESIN AND DEXTROMETHORPHAN 5 ML: 100; 10 SYRUP ORAL at 05:24

## 2020-12-05 RX ADMIN — DEXAMETHASONE SODIUM PHOSPHATE 20 MG: 4 INJECTION, SOLUTION INTRA-ARTICULAR; INTRALESIONAL; INTRAMUSCULAR; INTRAVENOUS; SOFT TISSUE at 10:42

## 2020-12-05 RX ADMIN — Medication 2 PUFF: at 15:46

## 2020-12-05 RX ADMIN — Medication 2 PUFF: at 12:17

## 2020-12-05 RX ADMIN — MONTELUKAST SODIUM 10 MG: 10 TABLET, COATED ORAL at 20:25

## 2020-12-05 RX ADMIN — ENOXAPARIN SODIUM 40 MG: 40 INJECTION SUBCUTANEOUS at 20:25

## 2020-12-05 RX ADMIN — GUAIFENESIN AND DEXTROMETHORPHAN 5 ML: 100; 10 SYRUP ORAL at 10:44

## 2020-12-05 RX ADMIN — CEFTRIAXONE 2 G: 2 INJECTION, POWDER, FOR SOLUTION INTRAMUSCULAR; INTRAVENOUS at 18:10

## 2020-12-05 RX ADMIN — GUAIFENESIN AND DEXTROMETHORPHAN 5 ML: 100; 10 SYRUP ORAL at 16:14

## 2020-12-05 RX ADMIN — REMDESIVIR 100 MG: 5 INJECTION INTRAVENOUS at 19:13

## 2020-12-05 RX ADMIN — CHOLECALCIFEROL TAB 125 MCG (5000 UNIT) 5000 UNITS: 125 TAB at 08:23

## 2020-12-05 RX ADMIN — Medication 10 ML: at 20:28

## 2020-12-05 RX ADMIN — ENOXAPARIN SODIUM 40 MG: 40 INJECTION SUBCUTANEOUS at 08:23

## 2020-12-05 RX ADMIN — Medication 10 ML: at 08:24

## 2020-12-05 RX ADMIN — Medication 2 PUFF: at 12:18

## 2020-12-05 RX ADMIN — Medication 2 PUFF: at 08:07

## 2020-12-05 RX ADMIN — FAMOTIDINE 20 MG: 20 TABLET ORAL at 20:25

## 2020-12-05 RX ADMIN — ACETAMINOPHEN 650 MG: 325 TABLET ORAL at 16:07

## 2020-12-05 RX ADMIN — FAMOTIDINE 20 MG: 20 TABLET ORAL at 08:23

## 2020-12-05 RX ADMIN — GUAIFENESIN AND DEXTROMETHORPHAN 5 ML: 100; 10 SYRUP ORAL at 20:24

## 2020-12-05 RX ADMIN — ACETAMINOPHEN 650 MG: 325 TABLET ORAL at 08:23

## 2020-12-05 RX ADMIN — AZITHROMYCIN MONOHYDRATE 500 MG: 500 INJECTION, POWDER, LYOPHILIZED, FOR SOLUTION INTRAVENOUS at 18:10

## 2020-12-05 ASSESSMENT — PAIN DESCRIPTION - PAIN TYPE: TYPE: ACUTE PAIN

## 2020-12-05 ASSESSMENT — PULMONARY FUNCTION TESTS
PEFR_L/MIN: 120
PEFR_L/MIN: 130
PEFR_L/MIN: 130
PEFR_L/MIN: 90

## 2020-12-05 ASSESSMENT — PAIN SCALES - GENERAL
PAINLEVEL_OUTOF10: 3
PAINLEVEL_OUTOF10: 0
PAINLEVEL_OUTOF10: 0
PAINLEVEL_OUTOF10: 3
PAINLEVEL_OUTOF10: 0
PAINLEVEL_OUTOF10: 0
PAINLEVEL_OUTOF10: 4
PAINLEVEL_OUTOF10: 0
PAINLEVEL_OUTOF10: 2
PAINLEVEL_OUTOF10: 4
PAINLEVEL_OUTOF10: 0

## 2020-12-05 ASSESSMENT — PAIN DESCRIPTION - DESCRIPTORS: DESCRIPTORS: ACHING

## 2020-12-05 ASSESSMENT — PAIN DESCRIPTION - LOCATION: LOCATION: BACK

## 2020-12-05 ASSESSMENT — PAIN DESCRIPTION - ORIENTATION: ORIENTATION: LOWER

## 2020-12-05 NOTE — PLAN OF CARE
Pt was on 6 Liters Nasal Cannula with oxygen saturation at 84% when walked back from bathroom. After several minutes and oxygen saturation not rising back up. Pt was placed on high flow to 15 liters with oxygen saturation increasing to 94%. Respirations of 20. Respiratory therapy at bedside. Bedside commode placed at bedside. NP notified. No new orders at this time.

## 2020-12-05 NOTE — PROGRESS NOTES
Hospitalist Progress Note      PCP: Estelle Rice MD    Date of Admission: 12/4/2020    Chief Complaint: Dyspnea    Hospital Course: 71-year-old female with history of severe persistent asthma presents for evaluation of dyspnea. She has been recently diagnosed with COVID-19 infection. Has COVID-19 pneumonia on radiographic images of the chest.  She is being treated with remdesivir corticosteroids and convalescent plasma. Pulmonology follows. Initially admitted on 5 L of nasal cannula which and went up to 15, however, has come down to 10 L at this time. Subjective: Patient seen and examined. She states that she is feeling much better than when she came in. She does not have perceived dyspnea at this time.       Medications:  Reviewed    Infusion Medications   Scheduled Medications    budesonide-formoterol  2 puff Inhalation BID    montelukast  10 mg Oral Nightly    sodium chloride flush  10 mL Intravenous 2 times per day    famotidine  20 mg Oral BID    vitamin D3  5,000 Units Oral Daily    cefTRIAXone (ROCEPHIN) IV  2 g Intravenous Q24H    azithromycin  500 mg Intravenous Q24H    albuterol sulfate HFA  2 puff Inhalation 4x Daily    ipratropium  2 puff Inhalation 4x daily    dexamethasone (DECADRON) IVPB  20 mg Intravenous Daily    enoxaparin  40 mg Subcutaneous BID    remdesivir IVPB  100 mg Intravenous Q24H     PRN Meds: albuterol sulfate HFA, ipratropium, sodium chloride flush, acetaminophen **OR** acetaminophen, polyethylene glycol, promethazine **OR** ondansetron, guaiFENesin-dextromethorphan, sodium chloride      Intake/Output Summary (Last 24 hours) at 12/5/2020 1540  Last data filed at 12/5/2020 1227  Gross per 24 hour   Intake 476 ml   Output 150 ml   Net 326 ml       Physical Exam Performed:    /75   Pulse 66   Temp 98 °F (36.7 °C) (Oral)   Resp 18   Ht 5' 6\" (1.676 m)   Wt (!) 336 lb (152.4 kg)   SpO2 94%   BMI 54.23 kg/m²     Physical Exam  Vitals signs and nursing note reviewed. Constitutional:       General: She is not in acute distress. Appearance: She is obese. She is not ill-appearing, toxic-appearing or diaphoretic. HENT:      Head: Atraumatic. Nose: Nose normal. No congestion. Mouth/Throat:      Mouth: Mucous membranes are moist.   Eyes:      General: No scleral icterus. Extraocular Movements: Extraocular movements intact. Pupils: Pupils are equal, round, and reactive to light. Neck:      Musculoskeletal: No neck rigidity or muscular tenderness. Vascular: No carotid bruit. Cardiovascular:      Rate and Rhythm: Normal rate and regular rhythm. Pulses: Normal pulses. Heart sounds: Normal heart sounds. No murmur. No friction rub. No gallop. Pulmonary:      Effort: Pulmonary effort is normal. No respiratory distress. Breath sounds: Normal breath sounds. No stridor. No wheezing, rhonchi or rales. Chest:      Chest wall: No tenderness. Abdominal:      General: Bowel sounds are normal. There is no distension. Palpations: Abdomen is soft. Tenderness: There is no abdominal tenderness. There is no guarding. Musculoskeletal: Normal range of motion. General: No tenderness or signs of injury. Right lower leg: No edema. Left lower leg: No edema. Lymphadenopathy:      Cervical: No cervical adenopathy. Skin:     General: Skin is warm and dry. Coloration: Skin is not jaundiced or pale. Neurological:      General: No focal deficit present. Mental Status: She is alert and oriented to person, place, and time. Cranial Nerves: No cranial nerve deficit. Psychiatric:         Mood and Affect: Mood normal.         Behavior: Behavior normal.         Thought Content:  Thought content normal.       Labs:   Recent Labs     12/04/20  1122 12/04/20  1841   WBC 9.1 5.5   HGB 14.0 13.4   HCT 42.9 41.5    226     Recent Labs     12/04/20  1122 12/04/20  1841    139   K

## 2020-12-05 NOTE — PROGRESS NOTES
[facility] Pulmonary and Critical Care  Progress note      Reason for Consult: Acute hypoxemic respiratory failure, COVID-19 pneumonia    Subjective:   CHIEF COMPLAINT / HPI:                The patient is a 53 y.o. female with significant past medical history of:      Diagnosis Date    Asthma      Interval history: Patient presented to the hospital yesterday with a chief complaint of increasingly severe shortness of breath of about a weeks duration. She had also mild associated cough. She had some fever as well. She thought she was having an asthma flare. She went to her PCP who gave her a steroid bolus but this did not help. Ultimately, she presented to the ED and was diagnosed with COVID-19 pneumonia. She has required up to 15 L/min HFNC. Excision requirements are beginning to decrease. She is starting to feel better.       Past Surgical History:        Procedure Laterality Date    CARPAL TUNNEL RELEASE      2 ON EACH SIDE    HERNIA REPAIR      X2    HYSTERECTOMY       Current Medications:    Current Facility-Administered Medications: albuterol sulfate  (90 Base) MCG/ACT inhaler 2 puff, 2 puff, Inhalation, Q4H PRN  ipratropium (ATROVENT HFA) 17 MCG/ACT inhaler 2 puff, 2 puff, Inhalation, Q4H PRN  budesonide-formoterol (SYMBICORT) 160-4.5 MCG/ACT inhaler 2 puff, 2 puff, Inhalation, BID  montelukast (SINGULAIR) tablet 10 mg, 10 mg, Oral, Nightly  sodium chloride flush 0.9 % injection 10 mL, 10 mL, Intravenous, 2 times per day  sodium chloride flush 0.9 % injection 10 mL, 10 mL, Intravenous, PRN  acetaminophen (TYLENOL) tablet 650 mg, 650 mg, Oral, Q6H PRN **OR** acetaminophen (TYLENOL) suppository 650 mg, 650 mg, Rectal, Q6H PRN  polyethylene glycol (GLYCOLAX) packet 17 g, 17 g, Oral, Daily PRN  promethazine (PHENERGAN) tablet 12.5 mg, 12.5 mg, Oral, Q6H PRN **OR** ondansetron (ZOFRAN) injection 4 mg, 4 mg, Intravenous, Q6H PRN  famotidine (PEPCID) tablet 20 mg, 20 mg, Oral, BID  vitamin D3 and hematuria  HEMATOLOGIC/LYMPHATIC:  negative for easy bruising, bleeding and lymphadenopathy  ALLERGIC/IMMUNOLOGIC:  negative for recurrent infections, angioedema, anaphylaxis and drug reactions  ENDOCRINE:  negative for weight changes and diabetic symptoms including polyuria, polydipsia and polyphagia  MUSCULOSKELETAL:  negative for  pain, joint swelling, decreased range of motion and muscle weakness  NEUROLOGICAL:  negative for headaches, slurred speech, unilateral weakness  PSYCHIATRIC/BEHAVIORAL: negative for hallucinations, behavioral problems, confusion and agitation. Objective:   PHYSICAL EXAM:      VITALS:  /75   Pulse 66   Temp 98 °F (36.7 °C) (Oral)   Resp 18   Ht 5' 6\" (1.676 m)   Wt (!) 336 lb (152.4 kg)   SpO2 94%   BMI 54.23 kg/m²      24HR INTAKE/OUTPUT:      Intake/Output Summary (Last 24 hours) at 12/5/2020 1350  Last data filed at 12/5/2020 1227  Gross per 24 hour   Intake 476 ml   Output 150 ml   Net 326 ml     CONSTITUTIONAL:  awake, alert, cooperative, no apparent distress, and appears stated age  NECK:  Supple, symmetrical, trachea midline, no adenopathy, thyroid symmetric, not enlarged and no tenderness, skin normal  LUNGS:  no increased work of breathing and clear to auscultation. No accessory muscle use  CARDIOVASCULAR: S1 and S2, no edema and no JVD  ABDOMEN:  normal bowel sounds, non-distended and no masses palpated, and no tenderness to palpation. No hepatospleenomegaly  LYMPHADENOPATHY:  no axillary or supraclavicular adenopathy. No cervical adnenopathy  PSYCHIATRIC: Oriented to person place and time. No obvious depression or anxiety. MUSCULOSKELETAL: No obvious misalignment or effusion of the joints. No clubbing, cyanosis of the digits. SKIN:  normal skin color, texture, turgor and no redness, warmth, or swelling.  No palpable nodules    DATA:    Old records have been reviewed    CBC:  Recent Labs     12/04/20  1122 12/04/20  1841   WBC 9.1 5.5   RBC 4.96 4.80   HGB 14.0 13.4   HCT 42.9 41.5    226   MCV 86.6 86.4   MCH 28.3 27.9   MCHC 32.7 32.4   RDW 15.2 15.3      BMP:  Recent Labs     12/04/20  1122 12/04/20  1841    139   K 3.4* 4.2    105   CO2 24 20*   BUN 17 15   CREATININE 0.7 0.7   CALCIUM 8.8 8.6   GLUCOSE 118* 239*      ABG:  No results for input(s): PHART, YKH8SCW, PO2ART, WIG8WMW, G6OKTGWO, BEART in the last 72 hours. No results found for: BNP  Lab Results   Component Value Date    TROPONINI <0.01 12/04/2020       Cultures:     Abx:    Radiology Review:  All pertinent images / reports were reviewed as a part of this visit. Assessment:     1. Acute hypoxemic respiratory failure  2. COVID-19 pneumonia  3. Moderate persistent asthma    I have reviewed laboratories, medical records and images for this visit  Chest imaging reveals extensive multifocal airspace disease  Has received remdesivir  Also received 1 unit convalescent plasma  Decadron 20 mg daily  Continue Symbicort and DuoNeb  Continue Singulair  Oxygen requirements have decreased.   Now on 10 L/min HFNC  Taper as tolerated

## 2020-12-05 NOTE — PLAN OF CARE
Problem: Gas Exchange - Impaired  Goal: Absence of hypoxia  Intervention: Monitor oxygen saturation  Note: Pt. requiring 15L High Flow NC. O2 saturation in low to mid 90's. Pt. Educated on breathing techniques and to self monitor O2 saturation via continuous pulse Ox. Pt. Encouraged to call RN if any difficulty breathing. Will attempt tp titrate as tolerated.       Problem: Safety:  Goal: Free from accidental physical injury  Description: Free from accidental physical injury  Outcome: Ongoing     Problem: Pain:  Goal: Patient's pain/discomfort is manageable  Description: Patient's pain/discomfort is manageable  Outcome: Ongoing

## 2020-12-06 LAB
A/G RATIO: 0.9 (ref 1.1–2.2)
ALBUMIN SERPL-MCNC: 3.6 G/DL (ref 3.4–5)
ALP BLD-CCNC: 117 U/L (ref 40–129)
ALT SERPL-CCNC: 35 U/L (ref 10–40)
ANION GAP SERPL CALCULATED.3IONS-SCNC: 9 MMOL/L (ref 3–16)
APTT: 26.4 SEC (ref 24.2–36.2)
AST SERPL-CCNC: 16 U/L (ref 15–37)
BASOPHILS ABSOLUTE: 0 K/UL (ref 0–0.2)
BASOPHILS RELATIVE PERCENT: 0.4 %
BILIRUB SERPL-MCNC: <0.2 MG/DL (ref 0–1)
BUN BLDV-MCNC: 23 MG/DL (ref 7–20)
CALCIUM SERPL-MCNC: 9 MG/DL (ref 8.3–10.6)
CHLORIDE BLD-SCNC: 109 MMOL/L (ref 99–110)
CO2: 22 MMOL/L (ref 21–32)
CREAT SERPL-MCNC: 0.7 MG/DL (ref 0.6–1.1)
D DIMER: 247 NG/ML DDU (ref 0–229)
EOSINOPHILS ABSOLUTE: 0 K/UL (ref 0–0.6)
EOSINOPHILS RELATIVE PERCENT: 0 %
FIBRINOGEN: 493 MG/DL (ref 200–397)
GFR AFRICAN AMERICAN: >60
GFR NON-AFRICAN AMERICAN: >60
GLOBULIN: 3.8 G/DL
GLUCOSE BLD-MCNC: 127 MG/DL (ref 70–99)
HCT VFR BLD CALC: 41.3 % (ref 36–48)
HEMOGLOBIN: 13.4 G/DL (ref 12–16)
INR BLD: 1 (ref 0.86–1.14)
LYMPHOCYTES ABSOLUTE: 0.6 K/UL (ref 1–5.1)
LYMPHOCYTES RELATIVE PERCENT: 10.2 %
MCH RBC QN AUTO: 28.2 PG (ref 26–34)
MCHC RBC AUTO-ENTMCNC: 32.4 G/DL (ref 31–36)
MCV RBC AUTO: 87 FL (ref 80–100)
MONOCYTES ABSOLUTE: 0.5 K/UL (ref 0–1.3)
MONOCYTES RELATIVE PERCENT: 8.7 %
NEUTROPHILS ABSOLUTE: 4.7 K/UL (ref 1.7–7.7)
NEUTROPHILS RELATIVE PERCENT: 80.7 %
PDW BLD-RTO: 15.3 % (ref 12.4–15.4)
PLATELET # BLD: 256 K/UL (ref 135–450)
PMV BLD AUTO: 7.5 FL (ref 5–10.5)
POTASSIUM REFLEX MAGNESIUM: 4.3 MMOL/L (ref 3.5–5.1)
PROTHROMBIN TIME: 11.6 SEC (ref 10–13.2)
RBC # BLD: 4.75 M/UL (ref 4–5.2)
SODIUM BLD-SCNC: 140 MMOL/L (ref 136–145)
STREP PNEUMONIAE ANTIGEN, URINE: NORMAL
TOTAL PROTEIN: 7.4 G/DL (ref 6.4–8.2)
WBC # BLD: 5.8 K/UL (ref 4–11)

## 2020-12-06 PROCEDURE — 2580000003 HC RX 258: Performed by: INTERNAL MEDICINE

## 2020-12-06 PROCEDURE — 36415 COLL VENOUS BLD VENIPUNCTURE: CPT

## 2020-12-06 PROCEDURE — 85025 COMPLETE CBC W/AUTO DIFF WBC: CPT

## 2020-12-06 PROCEDURE — 2700000000 HC OXYGEN THERAPY PER DAY

## 2020-12-06 PROCEDURE — 94640 AIRWAY INHALATION TREATMENT: CPT

## 2020-12-06 PROCEDURE — 2500000003 HC RX 250 WO HCPCS: Performed by: INTERNAL MEDICINE

## 2020-12-06 PROCEDURE — 85384 FIBRINOGEN ACTIVITY: CPT

## 2020-12-06 PROCEDURE — 6370000000 HC RX 637 (ALT 250 FOR IP): Performed by: INTERNAL MEDICINE

## 2020-12-06 PROCEDURE — 99232 SBSQ HOSP IP/OBS MODERATE 35: CPT | Performed by: INTERNAL MEDICINE

## 2020-12-06 PROCEDURE — 94761 N-INVAS EAR/PLS OXIMETRY MLT: CPT

## 2020-12-06 PROCEDURE — 85610 PROTHROMBIN TIME: CPT

## 2020-12-06 PROCEDURE — 80053 COMPREHEN METABOLIC PANEL: CPT

## 2020-12-06 PROCEDURE — 6360000002 HC RX W HCPCS: Performed by: INTERNAL MEDICINE

## 2020-12-06 PROCEDURE — 85730 THROMBOPLASTIN TIME PARTIAL: CPT

## 2020-12-06 PROCEDURE — 85379 FIBRIN DEGRADATION QUANT: CPT

## 2020-12-06 PROCEDURE — 1200000000 HC SEMI PRIVATE

## 2020-12-06 RX ORDER — LORAZEPAM 0.5 MG/1
0.5 TABLET ORAL EVERY 6 HOURS PRN
Status: DISCONTINUED | OUTPATIENT
Start: 2020-12-06 | End: 2020-12-13 | Stop reason: HOSPADM

## 2020-12-06 RX ADMIN — ACETAMINOPHEN 650 MG: 325 TABLET ORAL at 17:21

## 2020-12-06 RX ADMIN — Medication 2 PUFF: at 15:45

## 2020-12-06 RX ADMIN — FAMOTIDINE 20 MG: 20 TABLET ORAL at 09:38

## 2020-12-06 RX ADMIN — REMDESIVIR 100 MG: 5 INJECTION INTRAVENOUS at 17:26

## 2020-12-06 RX ADMIN — Medication 2 PUFF: at 08:03

## 2020-12-06 RX ADMIN — LORAZEPAM 0.5 MG: 0.5 TABLET ORAL at 12:38

## 2020-12-06 RX ADMIN — ACETAMINOPHEN 650 MG: 325 TABLET ORAL at 09:38

## 2020-12-06 RX ADMIN — LORAZEPAM 0.5 MG: 0.5 TABLET ORAL at 18:30

## 2020-12-06 RX ADMIN — Medication 2 PUFF: at 01:03

## 2020-12-06 RX ADMIN — MONTELUKAST SODIUM 10 MG: 10 TABLET, COATED ORAL at 20:43

## 2020-12-06 RX ADMIN — Medication 2 PUFF: at 20:48

## 2020-12-06 RX ADMIN — ENOXAPARIN SODIUM 40 MG: 40 INJECTION SUBCUTANEOUS at 20:43

## 2020-12-06 RX ADMIN — Medication 10 ML: at 09:39

## 2020-12-06 RX ADMIN — Medication 2 PUFF: at 12:13

## 2020-12-06 RX ADMIN — DEXAMETHASONE SODIUM PHOSPHATE 20 MG: 4 INJECTION, SOLUTION INTRA-ARTICULAR; INTRALESIONAL; INTRAMUSCULAR; INTRAVENOUS; SOFT TISSUE at 09:46

## 2020-12-06 RX ADMIN — Medication 10 ML: at 20:43

## 2020-12-06 RX ADMIN — Medication 2 PUFF: at 15:46

## 2020-12-06 RX ADMIN — FAMOTIDINE 20 MG: 20 TABLET ORAL at 20:42

## 2020-12-06 RX ADMIN — ENOXAPARIN SODIUM 40 MG: 40 INJECTION SUBCUTANEOUS at 09:38

## 2020-12-06 RX ADMIN — Medication 2 PUFF: at 04:20

## 2020-12-06 RX ADMIN — GUAIFENESIN AND DEXTROMETHORPHAN 5 ML: 100; 10 SYRUP ORAL at 04:21

## 2020-12-06 RX ADMIN — Medication 2 PUFF: at 12:14

## 2020-12-06 RX ADMIN — CHOLECALCIFEROL TAB 125 MCG (5000 UNIT) 5000 UNITS: 125 TAB at 09:42

## 2020-12-06 RX ADMIN — GUAIFENESIN AND DEXTROMETHORPHAN 5 ML: 100; 10 SYRUP ORAL at 17:22

## 2020-12-06 RX ADMIN — GUAIFENESIN AND DEXTROMETHORPHAN 5 ML: 100; 10 SYRUP ORAL at 09:38

## 2020-12-06 ASSESSMENT — PAIN SCALES - GENERAL
PAINLEVEL_OUTOF10: 0
PAINLEVEL_OUTOF10: 0
PAINLEVEL_OUTOF10: 2
PAINLEVEL_OUTOF10: 0
PAINLEVEL_OUTOF10: 3
PAINLEVEL_OUTOF10: 5
PAINLEVEL_OUTOF10: 3
PAINLEVEL_OUTOF10: 5
PAINLEVEL_OUTOF10: 0
PAINLEVEL_OUTOF10: 5
PAINLEVEL_OUTOF10: 5
PAINLEVEL_OUTOF10: 0
PAINLEVEL_OUTOF10: 0

## 2020-12-06 ASSESSMENT — PAIN DESCRIPTION - PAIN TYPE
TYPE: ACUTE PAIN
TYPE: ACUTE PAIN

## 2020-12-06 ASSESSMENT — PAIN DESCRIPTION - LOCATION
LOCATION: BACK;HEAD
LOCATION: BACK;HEAD

## 2020-12-06 ASSESSMENT — PULMONARY FUNCTION TESTS
PEFR_L/MIN: 140
PEFR_L/MIN: 160
PEFR_L/MIN: 160
PEFR_L/MIN: 130
PEFR_L/MIN: 140

## 2020-12-06 ASSESSMENT — PAIN DESCRIPTION - ORIENTATION
ORIENTATION: LOWER
ORIENTATION: LOWER

## 2020-12-06 ASSESSMENT — PAIN DESCRIPTION - DESCRIPTORS
DESCRIPTORS: ACHING;HEADACHE
DESCRIPTORS: ACHING;HEADACHE

## 2020-12-06 NOTE — PROGRESS NOTES
[facility] Pulmonary and Critical Care  Progress note      Reason for Consult: Acute hypoxemic respiratory failure, COVID-19 pneumonia    Subjective:   CHIEF COMPLAINT / HPI:                The patient is a 53 y.o. female with significant past medical history of:      Diagnosis Date    Asthma      Interval history: Patient remains anxious. Desaturating with activity. .      Past Surgical History:        Procedure Laterality Date    CARPAL TUNNEL RELEASE      2 ON EACH SIDE    HERNIA REPAIR      X2    HYSTERECTOMY       Current Medications:    Current Facility-Administered Medications: LORazepam (ATIVAN) tablet 0.5 mg, 0.5 mg, Oral, Q6H PRN  albuterol sulfate  (90 Base) MCG/ACT inhaler 2 puff, 2 puff, Inhalation, Q4H PRN  ipratropium (ATROVENT HFA) 17 MCG/ACT inhaler 2 puff, 2 puff, Inhalation, Q4H PRN  budesonide-formoterol (SYMBICORT) 160-4.5 MCG/ACT inhaler 2 puff, 2 puff, Inhalation, BID  montelukast (SINGULAIR) tablet 10 mg, 10 mg, Oral, Nightly  sodium chloride flush 0.9 % injection 10 mL, 10 mL, Intravenous, 2 times per day  sodium chloride flush 0.9 % injection 10 mL, 10 mL, Intravenous, PRN  acetaminophen (TYLENOL) tablet 650 mg, 650 mg, Oral, Q6H PRN **OR** acetaminophen (TYLENOL) suppository 650 mg, 650 mg, Rectal, Q6H PRN  polyethylene glycol (GLYCOLAX) packet 17 g, 17 g, Oral, Daily PRN  promethazine (PHENERGAN) tablet 12.5 mg, 12.5 mg, Oral, Q6H PRN **OR** ondansetron (ZOFRAN) injection 4 mg, 4 mg, Intravenous, Q6H PRN  famotidine (PEPCID) tablet 20 mg, 20 mg, Oral, BID  vitamin D3 (CHOLECALCIFEROL) tablet 5,000 Units, 5,000 Units, Oral, Daily  guaiFENesin-dextromethorphan (ROBITUSSIN DM) 100-10 MG/5ML syrup 5 mL, 5 mL, Oral, Q4H PRN  albuterol sulfate  (90 Base) MCG/ACT inhaler 2 puff, 2 puff, Inhalation, 4x Daily  ipratropium (ATROVENT HFA) 17 MCG/ACT inhaler 2 puff, 2 puff, Inhalation, 4x daily  dexamethasone (DECADRON) 20 mg in sodium chloride 0.9 % IVPB, 20 mg, Intravenous, Daily  enoxaparin (LOVENOX) injection 40 mg, 40 mg, Subcutaneous, BID  [COMPLETED] remdesivir 200 mg in sodium chloride 0.9 % 250 mL IVPB, 200 mg, Intravenous, Once **FOLLOWED BY** remdesivir 100 mg in sodium chloride 0.9 % 250 mL IVPB, 100 mg, Intravenous, Q24H  0.9 % sodium chloride bolus, 30 mL, Intravenous, PRN    Allergies   Allergen Reactions    Penicillins Rash       Social History:    TOBACCO:   reports that she has never smoked. She has never used smokeless tobacco.  ETOH:   reports current alcohol use. Patient currently lives independently  Environmental/chemical exposure: None known    Family History:   History reviewed. No pertinent family history. REVIEW OF SYSTEMS:    CONSTITUTIONAL:  negative for fevers, chills, diaphoresis, activity change, appetite change, fatigue, night sweats and unexpected weight change.    EYES:  negative for blurred vision, eye discharge, visual disturbance and icterus  HEENT:  negative for hearing loss, tinnitus, ear drainage, sinus pressure, nasal congestion, epistaxis and snoring  RESPIRATORY:  See HPI  CARDIOVASCULAR:  negative for chest pain, palpitations, exertional chest pressure/discomfort, edema, syncope  GASTROINTESTINAL:  negative for nausea, vomiting, diarrhea, constipation, blood in stool and abdominal pain  GENITOURINARY:  negative for frequency, dysuria, urinary incontinence, decreased urine volume, and hematuria  HEMATOLOGIC/LYMPHATIC:  negative for easy bruising, bleeding and lymphadenopathy  ALLERGIC/IMMUNOLOGIC:  negative for recurrent infections, angioedema, anaphylaxis and drug reactions  ENDOCRINE:  negative for weight changes and diabetic symptoms including polyuria, polydipsia and polyphagia  MUSCULOSKELETAL:  negative for  pain, joint swelling, decreased range of motion and muscle weakness  NEUROLOGICAL:  negative for headaches, slurred speech, unilateral weakness  PSYCHIATRIC/BEHAVIORAL: negative for hallucinations, behavioral problems, confusion and agitation. Objective:   PHYSICAL EXAM:      VITALS:  /84   Pulse 70   Temp 98.3 °F (36.8 °C) (Oral)   Resp 18   Ht 5' 6\" (1.676 m)   Wt (!) 315 lb 4.8 oz (143 kg)   SpO2 96%   BMI 50.89 kg/m²      24HR INTAKE/OUTPUT:      Intake/Output Summary (Last 24 hours) at 12/6/2020 1533  Last data filed at 12/6/2020 1429  Gross per 24 hour   Intake 480 ml   Output --   Net 480 ml     PHYSICAL EXAM:     In-person bedside physical examination deferred. Pursuant to the emergency declaration under the [address] [place] [address], [address] [place] [address] and the [facility] and [employer] Act, this clinical encounter was conducted to provide necessary medical care. (Also consistent with new provisions and guidance offered by [facility] on March 18, 2020 in setting of COVID 19 outbreak and in order to preserve personal protective equipment in accordance with the flexibilities announced by CMS on March 30, 2020)   References: https://[facility]/Portals/0/Resources/COVID-19/3_18%20Telemed%20Guidance%20Updated%20March%2018. pdf?[email or web address]                      https://[facility]/Portals/0/Resources/COVID-19/3_18%20Telemed%20Guidance%20Updated%20March%2018. pdf?[email or web address]                      [email or web address] pdf                            General: Awake and alert according to primary service, vitals reviewed  HEENT: Deferred  Cardiovascular: Telemetry data reviewed, rest deferred   Pulmonary: deferred  Abdomen/GI: deferred  Neuro: deferred  Skin: deferred  Musculoskeletal:  deferred  Genitourinary: Deferred  Psych: deferred  Lymphatic/Immunologic: deferred      DATA:    Old records have been reviewed    CBC:  Recent Labs     12/04/20  1122 12/04/20  1841 12/06/20  0656   WBC 9.1 5.5 5.8   RBC 4.96 4.80 4.75   HGB 14.0 13.4 13.4   HCT 42.9 41.5 41.3    226 256   MCV 86.6 86.4 87.0   MCH 28.3 27.9 28.2   MCHC 32.7 32.4 32.4   RDW 15.2 15.3 15.3      BMP:  Recent Labs     12/04/20  1122 12/04/20  1841 12/06/20  0656    139 140   K 3.4* 4.2 4.3    105 109   CO2 24 20* 22   BUN 17 15 23*   CREATININE 0.7 0.7 0.7   CALCIUM 8.8 8.6 9.0   GLUCOSE 118* 239* 127*      ABG:  No results for input(s): PHART, XJI6YJU, PO2ART, BVA6CBC, D3UAYMEL, BEART in the last 72 hours. No results found for: BNP  Lab Results   Component Value Date    TROPONINI <0.01 12/04/2020       Cultures:     Abx:    Radiology Review:  All pertinent images / reports were reviewed as a part of this visit. Assessment:     1. Acute hypoxemic respiratory failure  2. COVID-19 pneumonia  3. Moderate persistent asthma    I have reviewed laboratories, medical records and images for this visit  Chest imaging reveals extensive multifocal airspace disease  Has received remdesivir  Also received 1 unit convalescent plasma  Decadron 20 mg daily  Continue Symbicort and DuoNeb  Continue Singulair  Oxygen requirements have increased somewhat today. Now requiring 14 L/min HFNC. However, saturations are in the mid to upper 90s and do not need to be that high. Wean oxygen to oxygen saturation 88 to 92%.

## 2020-12-06 NOTE — PROGRESS NOTES
Pt. With high anxiety to bed side commode. O2 level at 87% on 11L high flow NC. Titrated up to 13L HFNC sat'ing at 89%. Pt. Now at 15 L HFNC sat'ing at 94% with breathing technique teaching per this RN and settled back into bed. Pt. encouraged to call RN for any further difficulty breathing. Will continue to monitor pt. respiratory status frequently.

## 2020-12-06 NOTE — PROGRESS NOTES
Hospitalist Progress Note      PCP: Page Duval MD    Date of Admission: 12/4/2020    Chief Complaint: Dyspnea    Hospital Course: 67-year-old female with history of severe persistent asthma presents for evaluation of dyspnea. She has been recently diagnosed with COVID-19 infection. Has COVID-19 pneumonia on radiographic images of the chest.  She is being treated with remdesivir corticosteroids and convalescent plasma. Pulmonology follows. Initially admitted on 5 L of nasal cannula which and went up to 15, however, has come down to 10 L at this time. 12/6/2020 -15 L per nasal cannula    Subjective:       Due to the current efforts to prevent transmission of COVID-19 and also the need to preserve PPE for other caregivers, a face-to-face encounter with the patient was not performed. That being said, all relevant records and diagnostic tests were reviewed, including laboratory results and imaging. Please reference any relevant documentation elsewhere.         Medications:  Reviewed    Infusion Medications   Scheduled Medications    budesonide-formoterol  2 puff Inhalation BID    montelukast  10 mg Oral Nightly    sodium chloride flush  10 mL Intravenous 2 times per day    famotidine  20 mg Oral BID    vitamin D3  5,000 Units Oral Daily    albuterol sulfate HFA  2 puff Inhalation 4x Daily    ipratropium  2 puff Inhalation 4x daily    dexamethasone (DECADRON) IVPB  20 mg Intravenous Daily    enoxaparin  40 mg Subcutaneous BID    remdesivir IVPB  100 mg Intravenous Q24H     PRN Meds: LORazepam, albuterol sulfate HFA, ipratropium, sodium chloride flush, acetaminophen **OR** acetaminophen, polyethylene glycol, promethazine **OR** ondansetron, guaiFENesin-dextromethorphan, sodium chloride      Intake/Output Summary (Last 24 hours) at 12/6/2020 1559  Last data filed at 12/6/2020 1429  Gross per 24 hour   Intake 480 ml   Output --   Net 480 ml       Physical Exam Performed:    /84 Pulse 70   Temp 98.3 °F (36.8 °C) (Oral)   Resp 18   Ht 5' 6\" (1.676 m)   Wt (!) 315 lb 4.8 oz (143 kg)   SpO2 97%   BMI 50.89 kg/m²     Physical Exam  Vitals signs and nursing note reviewed. Constitutional:       General: She is not in acute distress. Appearance: She is obese. She is not ill-appearing, toxic-appearing or diaphoretic. HENT:      Head: Atraumatic. Nose: Nose normal. No congestion. Mouth/Throat:      Mouth: Mucous membranes are moist.   Eyes:      General: No scleral icterus. Extraocular Movements: Extraocular movements intact. Pupils: Pupils are equal, round, and reactive to light. Neck:      Musculoskeletal: No neck rigidity or muscular tenderness. Vascular: No carotid bruit. Cardiovascular:      Rate and Rhythm: Normal rate and regular rhythm. Pulses: Normal pulses. Heart sounds: Normal heart sounds. No murmur. No friction rub. No gallop. Pulmonary:      Effort: Pulmonary effort is normal. No respiratory distress. Breath sounds: Normal breath sounds. No stridor. No wheezing, rhonchi or rales. Chest:      Chest wall: No tenderness. Abdominal:      General: Bowel sounds are normal. There is no distension. Palpations: Abdomen is soft. Tenderness: There is no abdominal tenderness. There is no guarding. Musculoskeletal: Normal range of motion. General: No tenderness or signs of injury. Right lower leg: No edema. Left lower leg: No edema. Lymphadenopathy:      Cervical: No cervical adenopathy. Skin:     General: Skin is warm and dry. Coloration: Skin is not jaundiced or pale. Neurological:      General: No focal deficit present. Mental Status: She is alert and oriented to person, place, and time. Cranial Nerves: No cranial nerve deficit. Psychiatric:         Mood and Affect: Mood normal.         Behavior: Behavior normal.         Thought Content:  Thought content normal.       Labs: Recent Labs     12/04/20  1122 12/04/20  1841 12/06/20  0656   WBC 9.1 5.5 5.8   HGB 14.0 13.4 13.4   HCT 42.9 41.5 41.3    226 256     Recent Labs     12/04/20  1122 12/04/20  1841 12/06/20  0656    139 140   K 3.4* 4.2 4.3    105 109   CO2 24 20* 22   BUN 17 15 23*   CREATININE 0.7 0.7 0.7   CALCIUM 8.8 8.6 9.0     Recent Labs     12/04/20  1841 12/06/20  0656   AST 17 16   ALT 17 35   BILITOT <0.2 <0.2   ALKPHOS 113 117     Recent Labs     12/04/20  1841 12/06/20  0656   INR 1.05 1.00     Recent Labs     12/04/20  1122   TROPONINI <0.01       Urinalysis:      Lab Results   Component Value Date    NITRU Negative 12/06/2014    WBCUA 20-50 12/06/2014    BACTERIA 3+ 12/06/2014    RBCUA 0-2 12/06/2014    BLOODU SMALL 12/06/2014    SPECGRAV 1.015 12/06/2014    GLUCOSEU Negative 12/06/2014       Radiology:  XR CHEST PORTABLE   Final Result   Extensive bilateral pulmonary disease suspicious for COVID/viral pneumonia.                  Assessment/Plan:    Active Hospital Problems    Diagnosis    Pneumonia due to COVID-19 virus [U07.1, J12.89]     COVID-19 pneumonia  With acute on chronic respiratory failure with hypoxemia  Oxygen supplementation as needed  IVCS, remdesivir  Pulmonology consultation  Consideration for plasma  Vitamin D  Intermediate intensity anticoagulation     Severe persistent asthma  Difficult to assess whether in exacerbation  No wheezing  Continue with inhaled bronchodilators  IV corticosteroids as above    DVT Prophylaxis: Lovenox  Diet: DIET GENERAL;  Code Status: Full Code    Electronically signed by Trudi Meadows MD on 12/6/2020 at 3:59 PM

## 2020-12-06 NOTE — PLAN OF CARE
Problem: Airway Clearance - Ineffective  Goal: Achieve or maintain patent airway  Outcome: Ongoing     Problem: Gas Exchange - Impaired  Goal: Absence of hypoxia  Outcome: Ongoing     Problem: Breathing Pattern - Ineffective  Goal: Ability to achieve and maintain a regular respiratory rate  Outcome: Ongoing     Problem: Fatigue  Goal: Verbalize increase energy and improved vitality  Outcome: Ongoing

## 2020-12-06 NOTE — PLAN OF CARE
Problem: Gas Exchange - Impaired  Goal: Absence of hypoxia  Intervention: Respiratory assessment  Note: Very SOB with ambulation/ exertion to [facility] along with high levels of anxiety knowing she has to use the commode. Will continue to monitor and assist with ambulation. Problem: Gas Exchange - Impaired  Goal: Absence of hypoxia  Intervention: Monitor oxygen saturation  Note: Pt. O[place] status [place] with [place] [facility] [place] at 15L High flow nasal Canula sat'ing at 94% at the moment. Technique breathing and coaxing needed to calm patient. MD reyes served for possible anxiety medication. Will continue to monitor closely.       Problem: Safety:  Goal: Free from accidental physical injury  Description: Free from accidental physical injury  Outcome: Ongoing     Problem: Pain:  Goal: Patient's pain/discomfort is manageable  Description: Patient's pain/discomfort is manageable  Outcome: Ongoing

## 2020-12-06 NOTE — PROGRESS NOTES
Patient is unable to do peak flow at this time due to increased WOB. Water added to high flow oxygen setup and increased to 11 Lpm due to SpO2 of 89%.

## 2020-12-07 LAB
A/G RATIO: 1 (ref 1.1–2.2)
ALBUMIN SERPL-MCNC: 3.5 G/DL (ref 3.4–5)
ALP BLD-CCNC: 102 U/L (ref 40–129)
ALT SERPL-CCNC: 30 U/L (ref 10–40)
ANION GAP SERPL CALCULATED.3IONS-SCNC: 11 MMOL/L (ref 3–16)
APTT: 23.8 SEC (ref 24.2–36.2)
AST SERPL-CCNC: 28 U/L (ref 15–37)
BASOPHILS ABSOLUTE: 0 K/UL (ref 0–0.2)
BASOPHILS RELATIVE PERCENT: 0.2 %
BILIRUB SERPL-MCNC: 0.3 MG/DL (ref 0–1)
BUN BLDV-MCNC: 27 MG/DL (ref 7–20)
CALCIUM SERPL-MCNC: 8.4 MG/DL (ref 8.3–10.6)
CHLORIDE BLD-SCNC: 107 MMOL/L (ref 99–110)
CO2: 23 MMOL/L (ref 21–32)
CREAT SERPL-MCNC: 0.6 MG/DL (ref 0.6–1.1)
D DIMER: 210 NG/ML DDU (ref 0–229)
EOSINOPHILS ABSOLUTE: 0 K/UL (ref 0–0.6)
EOSINOPHILS RELATIVE PERCENT: 0 %
FIBRINOGEN: 450 MG/DL (ref 200–397)
GFR AFRICAN AMERICAN: >60
GFR NON-AFRICAN AMERICAN: >60
GLOBULIN: 3.6 G/DL
GLUCOSE BLD-MCNC: 131 MG/DL (ref 70–99)
HCT VFR BLD CALC: 40.6 % (ref 36–48)
HEMOGLOBIN: 13.2 G/DL (ref 12–16)
INR BLD: 0.97 (ref 0.86–1.14)
LACTIC ACID: 1.9 MMOL/L (ref 0.4–2)
LYMPHOCYTES ABSOLUTE: 0.5 K/UL (ref 1–5.1)
LYMPHOCYTES RELATIVE PERCENT: 7.3 %
MCH RBC QN AUTO: 28.1 PG (ref 26–34)
MCHC RBC AUTO-ENTMCNC: 32.6 G/DL (ref 31–36)
MCV RBC AUTO: 86.3 FL (ref 80–100)
MONOCYTES ABSOLUTE: 0.4 K/UL (ref 0–1.3)
MONOCYTES RELATIVE PERCENT: 5.6 %
NEUTROPHILS ABSOLUTE: 6.5 K/UL (ref 1.7–7.7)
NEUTROPHILS RELATIVE PERCENT: 86.9 %
PDW BLD-RTO: 14.9 % (ref 12.4–15.4)
PLATELET # BLD: 276 K/UL (ref 135–450)
PMV BLD AUTO: 7.8 FL (ref 5–10.5)
POTASSIUM REFLEX MAGNESIUM: 4.3 MMOL/L (ref 3.5–5.1)
PROTHROMBIN TIME: 11.2 SEC (ref 10–13.2)
RBC # BLD: 4.7 M/UL (ref 4–5.2)
SODIUM BLD-SCNC: 141 MMOL/L (ref 136–145)
TOTAL PROTEIN: 7.1 G/DL (ref 6.4–8.2)
WBC # BLD: 7.5 K/UL (ref 4–11)

## 2020-12-07 PROCEDURE — 6370000000 HC RX 637 (ALT 250 FOR IP): Performed by: INTERNAL MEDICINE

## 2020-12-07 PROCEDURE — 85610 PROTHROMBIN TIME: CPT

## 2020-12-07 PROCEDURE — 80053 COMPREHEN METABOLIC PANEL: CPT

## 2020-12-07 PROCEDURE — 2500000003 HC RX 250 WO HCPCS: Performed by: INTERNAL MEDICINE

## 2020-12-07 PROCEDURE — 1200000000 HC SEMI PRIVATE

## 2020-12-07 PROCEDURE — 6360000002 HC RX W HCPCS: Performed by: INTERNAL MEDICINE

## 2020-12-07 PROCEDURE — 85025 COMPLETE CBC W/AUTO DIFF WBC: CPT

## 2020-12-07 PROCEDURE — 94640 AIRWAY INHALATION TREATMENT: CPT

## 2020-12-07 PROCEDURE — 85730 THROMBOPLASTIN TIME PARTIAL: CPT

## 2020-12-07 PROCEDURE — 85384 FIBRINOGEN ACTIVITY: CPT

## 2020-12-07 PROCEDURE — 2580000003 HC RX 258: Performed by: INTERNAL MEDICINE

## 2020-12-07 PROCEDURE — 94669 MECHANICAL CHEST WALL OSCILL: CPT

## 2020-12-07 PROCEDURE — 2700000000 HC OXYGEN THERAPY PER DAY

## 2020-12-07 PROCEDURE — 94761 N-INVAS EAR/PLS OXIMETRY MLT: CPT

## 2020-12-07 PROCEDURE — 83605 ASSAY OF LACTIC ACID: CPT

## 2020-12-07 PROCEDURE — 85379 FIBRIN DEGRADATION QUANT: CPT

## 2020-12-07 PROCEDURE — 36415 COLL VENOUS BLD VENIPUNCTURE: CPT

## 2020-12-07 PROCEDURE — 99233 SBSQ HOSP IP/OBS HIGH 50: CPT | Performed by: INTERNAL MEDICINE

## 2020-12-07 RX ORDER — ALBUTEROL SULFATE 90 UG/1
2 AEROSOL, METERED RESPIRATORY (INHALATION) 4 TIMES DAILY
Status: DISCONTINUED | OUTPATIENT
Start: 2020-12-07 | End: 2020-12-13 | Stop reason: HOSPADM

## 2020-12-07 RX ORDER — HYDROCHLOROTHIAZIDE 25 MG/1
25 TABLET ORAL DAILY
Status: DISCONTINUED | OUTPATIENT
Start: 2020-12-07 | End: 2020-12-13 | Stop reason: HOSPADM

## 2020-12-07 RX ADMIN — Medication 2 PUFF: at 20:56

## 2020-12-07 RX ADMIN — LORAZEPAM 0.5 MG: 0.5 TABLET ORAL at 18:31

## 2020-12-07 RX ADMIN — GUAIFENESIN AND DEXTROMETHORPHAN 5 ML: 100; 10 SYRUP ORAL at 18:31

## 2020-12-07 RX ADMIN — Medication 10 ML: at 09:27

## 2020-12-07 RX ADMIN — Medication 2 PUFF: at 20:57

## 2020-12-07 RX ADMIN — FAMOTIDINE 20 MG: 20 TABLET ORAL at 09:14

## 2020-12-07 RX ADMIN — DEXAMETHASONE SODIUM PHOSPHATE 20 MG: 4 INJECTION, SOLUTION INTRA-ARTICULAR; INTRALESIONAL; INTRAMUSCULAR; INTRAVENOUS; SOFT TISSUE at 09:14

## 2020-12-07 RX ADMIN — ACETAMINOPHEN 650 MG: 325 TABLET ORAL at 00:47

## 2020-12-07 RX ADMIN — GUAIFENESIN AND DEXTROMETHORPHAN 5 ML: 100; 10 SYRUP ORAL at 11:00

## 2020-12-07 RX ADMIN — MONTELUKAST SODIUM 10 MG: 10 TABLET, COATED ORAL at 20:57

## 2020-12-07 RX ADMIN — GUAIFENESIN AND DEXTROMETHORPHAN 5 ML: 100; 10 SYRUP ORAL at 00:47

## 2020-12-07 RX ADMIN — Medication 2 PUFF: at 06:18

## 2020-12-07 RX ADMIN — LORAZEPAM 0.5 MG: 0.5 TABLET ORAL at 00:47

## 2020-12-07 RX ADMIN — ENOXAPARIN SODIUM 40 MG: 40 INJECTION SUBCUTANEOUS at 09:14

## 2020-12-07 RX ADMIN — CHOLECALCIFEROL TAB 125 MCG (5000 UNIT) 5000 UNITS: 125 TAB at 09:14

## 2020-12-07 RX ADMIN — Medication 2 PUFF: at 10:38

## 2020-12-07 RX ADMIN — REMDESIVIR 100 MG: 5 INJECTION INTRAVENOUS at 18:30

## 2020-12-07 RX ADMIN — Medication 2 PUFF: at 10:37

## 2020-12-07 RX ADMIN — ENOXAPARIN SODIUM 40 MG: 40 INJECTION SUBCUTANEOUS at 20:58

## 2020-12-07 RX ADMIN — Medication 2 PUFF: at 15:51

## 2020-12-07 RX ADMIN — ACETAMINOPHEN 650 MG: 325 TABLET ORAL at 11:00

## 2020-12-07 RX ADMIN — Medication 2 PUFF: at 06:19

## 2020-12-07 RX ADMIN — HYDROCHLOROTHIAZIDE 25 MG: 25 TABLET ORAL at 15:41

## 2020-12-07 RX ADMIN — Medication 10 ML: at 21:58

## 2020-12-07 RX ADMIN — FAMOTIDINE 20 MG: 20 TABLET ORAL at 20:57

## 2020-12-07 RX ADMIN — ACETAMINOPHEN 650 MG: 325 TABLET ORAL at 18:31

## 2020-12-07 ASSESSMENT — PULMONARY FUNCTION TESTS
PEFR_L/MIN: 120
PEFR_L/MIN: 140

## 2020-12-07 ASSESSMENT — PAIN SCALES - GENERAL
PAINLEVEL_OUTOF10: 0
PAINLEVEL_OUTOF10: 1
PAINLEVEL_OUTOF10: 0

## 2020-12-07 ASSESSMENT — PAIN SCALES - WONG BAKER
WONGBAKER_NUMERICALRESPONSE: 0

## 2020-12-07 NOTE — PROGRESS NOTES
Hospitalist Progress Note      PCP: Fredy Nixon MD    Date of Admission: 12/4/2020    Chief Complaint: Dyspnea    Hospital Course: This 61-year-old female with history of severe persistent asthma presented for evaluation of dyspnea. Pt was  recently diagnosed with COVID-19 infection. On admission, pt was hypoxic and started on 5 L of nasal cannula. Later O2 requirement increase 15L and pt was started on remdesivir, corticosteroids and convalescent plasma per pilmonary recs. Initially admitted on hich and went up to 15, however, has come down to 10 L at this time. Interval history:   Overnight events noted  Currently on 13 HF and L satting around 96%; wean as tolerated sats above 94%  Afebrile, WBC within normal limits  S/p convalescent plasma; on remdesivir and high-dose Decadron    Due to the current efforts to prevent transmission of COVID-19 and also the need to preserve PPE for other caregivers, a face-to-face encounter with the patient was not performed. That being said, all relevant records and diagnostic tests were reviewed, including laboratory results and imaging. Please reference any relevant documentation elsewhere.       Medications:  Reviewed    Infusion Medications   Scheduled Medications    budesonide-formoterol  2 puff Inhalation BID    montelukast  10 mg Oral Nightly    sodium chloride flush  10 mL Intravenous 2 times per day    famotidine  20 mg Oral BID    vitamin D3  5,000 Units Oral Daily    albuterol sulfate HFA  2 puff Inhalation 4x Daily    ipratropium  2 puff Inhalation 4x daily    dexamethasone (DECADRON) IVPB  20 mg Intravenous Daily    enoxaparin  40 mg Subcutaneous BID    remdesivir IVPB  100 mg Intravenous Q24H     PRN Meds: LORazepam, albuterol sulfate HFA, ipratropium, sodium chloride flush, acetaminophen **OR** acetaminophen, polyethylene glycol, promethazine **OR** ondansetron, guaiFENesin-dextromethorphan, sodium chloride      Intake/Output Summary (Last 24 hours) at 12/7/2020 0842  Last data filed at 12/7/2020 0559  Gross per 24 hour   Intake 480 ml   Output 1600 ml   Net -1120 ml         BP (!) 163/99   Pulse 59   Temp 97.6 °F (36.4 °C) (Oral)   Resp 16   Ht 5' 6\" (1.676 m)   Wt (!) 327 lb 1.6 oz (148.4 kg)   SpO2 96%   BMI 52.80 kg/m²     Physical Exam  Vitals signs and nursing note reviewed. Constitutional:       General: She is not in acute distress. Cardiovascular:      Rate and Rhythm: Normal rate and regular rhythm. Heart sounds: Normal heart sounds. No murmur. No friction rub. No gallop. Pulmonary:      Effort: Pulmonary effort is normal. No respiratory distress. Breath sounds: Normal breath sounds. No stridor  Chest:      Chest wall: No tenderness. Abdominal:      General: Bowel sounds are normal. There is no distension. Neurological:      General: No focal deficit present. Mental Status: She is alert and oriented to person, place, and time.        Labs:   Recent Labs     12/04/20 1841 12/06/20  0656 12/07/20  0547   WBC 5.5 5.8 7.5   HGB 13.4 13.4 13.2   HCT 41.5 41.3 40.6    256 276     Recent Labs     12/04/20  1841 12/06/20  0656 12/07/20  0547    140 141   K 4.2 4.3 4.3    109 107   CO2 20* 22 23   BUN 15 23* 27*   CREATININE 0.7 0.7 0.6   CALCIUM 8.6 9.0 8.4     Recent Labs     12/04/20  1841 12/06/20  0656 12/07/20  0547   AST 17 16 28   ALT 17 35 30   BILITOT <0.2 <0.2 0.3   ALKPHOS 113 117 102     Recent Labs     12/04/20  1841 12/06/20  0656 12/07/20  0547   INR 1.05 1.00 0.97     Recent Labs     12/04/20  1122   TROPONINI <0.01       Urinalysis:      Lab Results   Component Value Date    NITRU Negative 12/06/2014    WBCUA 20-50 12/06/2014    BACTERIA 3+ 12/06/2014    RBCUA 0-2 12/06/2014    BLOODU SMALL 12/06/2014    SPECGRAV 1.015 12/06/2014    GLUCOSEU Negative 12/06/2014       Radiology:  XR CHEST PORTABLE   Final Result   Extensive bilateral pulmonary disease suspicious for COVID/viral pneumonia.                  Assessment/Plan:    Active Hospital Problems    Diagnosis    Pneumonia due to COVID-19 virus [U07.1, J12.89]      Acute hypoxic respiratory failure 2/2 COVID-19 pneumonia  Pulmonology on board: repeat CXR TOMORROW   S/p convalescent plasma; on day 4/5 of  remdesivir and high dose dexamethasone  dexamethasone  Continue breathing treatment and supplementation O2  to keep sats b/w 88-92 %   Continue anticoagulation    Moderate  persistent asthma: not in exacerbation  Continue with inhaled bronchodilators  IV corticosteroids as above    Hypertension: started on home dose of HCTZ  Monitor BP closely     DVT Prophylaxis: Lovenox  Diet: DIET GENERAL;  Code Status: Full Code    Electronically signed by Naila Reveles MD on 12/7/2020 at 8:42 AM

## 2020-12-07 NOTE — PLAN OF CARE
Problem: Airway Clearance - Ineffective  Goal: Achieve or maintain patent airway  Outcome: Ongoing     Problem: Gas Exchange - Impaired  Goal: Absence of hypoxia  Outcome: Ongoing     Problem:  Body Temperature -  Risk of, Imbalanced  Goal: Ability to maintain a body temperature within defined limits  Outcome: Ongoing

## 2020-12-07 NOTE — PROGRESS NOTES
[facility] Pulmonary and Critical Care  Progress note      Reason for Consult: Acute hypoxemic respiratory failure, COVID-19 pneumonia    Subjective:   CHIEF COMPLAINT / HPI:                The patient is a 53 y.o. female with significant past medical history of:      Diagnosis Date    Asthma      Interval history: Patient lying in bed in no apparent respiratory distress. Reports that her breathing is slightly better. Remains on high flow nasal cannula. Desaturating with activity. .      Past Surgical History:        Procedure Laterality Date    CARPAL TUNNEL RELEASE      2 ON EACH SIDE    HERNIA REPAIR      X2    HYSTERECTOMY       Current Medications:    Current Facility-Administered Medications: albuterol sulfate  (90 Base) MCG/ACT inhaler 2 puff, 2 puff, Inhalation, 4x Daily  LORazepam (ATIVAN) tablet 0.5 mg, 0.5 mg, Oral, Q6H PRN  albuterol sulfate  (90 Base) MCG/ACT inhaler 2 puff, 2 puff, Inhalation, Q4H PRN  ipratropium (ATROVENT HFA) 17 MCG/ACT inhaler 2 puff, 2 puff, Inhalation, Q4H PRN  budesonide-formoterol (SYMBICORT) 160-4.5 MCG/ACT inhaler 2 puff, 2 puff, Inhalation, BID  montelukast (SINGULAIR) tablet 10 mg, 10 mg, Oral, Nightly  sodium chloride flush 0.9 % injection 10 mL, 10 mL, Intravenous, 2 times per day  sodium chloride flush 0.9 % injection 10 mL, 10 mL, Intravenous, PRN  acetaminophen (TYLENOL) tablet 650 mg, 650 mg, Oral, Q6H PRN **OR** acetaminophen (TYLENOL) suppository 650 mg, 650 mg, Rectal, Q6H PRN  polyethylene glycol (GLYCOLAX) packet 17 g, 17 g, Oral, Daily PRN  promethazine (PHENERGAN) tablet 12.5 mg, 12.5 mg, Oral, Q6H PRN **OR** ondansetron (ZOFRAN) injection 4 mg, 4 mg, Intravenous, Q6H PRN  famotidine (PEPCID) tablet 20 mg, 20 mg, Oral, BID  vitamin D3 (CHOLECALCIFEROL) tablet 5,000 Units, 5,000 Units, Oral, Daily  guaiFENesin-dextromethorphan (ROBITUSSIN DM) 100-10 MG/5ML syrup 5 mL, 5 mL, Oral, Q4H PRN  ipratropium (ATROVENT HFA) 17 MCG/ACT inhaler 2 puff, 2 puff, Inhalation, 4x daily  dexamethasone (DECADRON) 20 mg in sodium chloride 0.9 % IVPB, 20 mg, Intravenous, Daily  enoxaparin (LOVENOX) injection 40 mg, 40 mg, Subcutaneous, BID  [COMPLETED] remdesivir 200 mg in sodium chloride 0.9 % 250 mL IVPB, 200 mg, Intravenous, Once **FOLLOWED BY** remdesivir 100 mg in sodium chloride 0.9 % 250 mL IVPB, 100 mg, Intravenous, Q24H  0.9 % sodium chloride bolus, 30 mL, Intravenous, PRN    Allergies   Allergen Reactions    Penicillins Rash       Social History:    TOBACCO:   reports that she has never smoked. She has never used smokeless tobacco.  ETOH:   reports current alcohol use. Patient currently lives independently  Environmental/chemical exposure: None known    Family History:   History reviewed. No pertinent family history. REVIEW OF SYSTEMS:    CONSTITUTIONAL:  negative for fevers, chills, diaphoresis, activity change, appetite change, fatigue, night sweats and unexpected weight change.    EYES:  negative for blurred vision, eye discharge, visual disturbance and icterus  HEENT:  negative for hearing loss, tinnitus, ear drainage, sinus pressure, nasal congestion, epistaxis and snoring  RESPIRATORY:  See HPI  CARDIOVASCULAR:  negative for chest pain, palpitations, exertional chest pressure/discomfort, edema, syncope  GASTROINTESTINAL:  negative for nausea, vomiting, diarrhea, constipation, blood in stool and abdominal pain  GENITOURINARY:  negative for frequency, dysuria, urinary incontinence, decreased urine volume, and hematuria  HEMATOLOGIC/LYMPHATIC:  negative for easy bruising, bleeding and lymphadenopathy  ALLERGIC/IMMUNOLOGIC:  negative for recurrent infections, angioedema, anaphylaxis and drug reactions  ENDOCRINE:  negative for weight changes and diabetic symptoms including polyuria, polydipsia and polyphagia  MUSCULOSKELETAL:  negative for  pain, joint swelling, decreased range of motion and muscle weakness  NEUROLOGICAL:  negative for headaches, slurred speech, unilateral weakness  PSYCHIATRIC/BEHAVIORAL: negative for hallucinations, behavioral problems, confusion and agitation. Objective:   PHYSICAL EXAM:      VITALS:  /82   Pulse 68   Temp 98.3 °F (36.8 °C) (Oral)   Resp 16   Ht 5' 6\" (1.676 m)   Wt (!) 327 lb 1.6 oz (148.4 kg)   SpO2 92%   BMI 52.80 kg/m²      24HR INTAKE/OUTPUT:      Intake/Output Summary (Last 24 hours) at 12/7/2020 1152  Last data filed at 12/7/2020 0559  Gross per 24 hour   Intake 480 ml   Output 1600 ml   Net -1120 ml     PHYSICAL EXAM:     CONSTITUTIONAL: She is a 53y.o.-year-old who appears her stated age. She is alert and oriented x 3 and in no acute distress. HEENT: PERRLA, EOMI. No scleral icterus. No thrush, atraumatic, normocephalic. NECK: Supple, without cervical or supraclavicular lymphadenopathy:  CARDIOVASCULAR: Deferred due to full PPE precautions  RESPIRATORY & CHEST: Deferred due to full PPE precautions  GASTROINTESTINAL & ABDOMEN: Soft, nontender,  non-distended, without hepatosplenomegaly. GENITOURINARY: Deferred. MUSCULOSKELETAL: No tenderness to palpation of the axial skeleton. There is no clubbing. No cyanosis. No edema of the lower extremities. SKIN OF BODY: No rash or jaundice. PSYCHIATRIC EVALUATION: Normal affect. Patient answers questions appropriately. HEMATOLOGIC/LYMPHATIC/ IMMUNOLOGIC: No palpable lymphadenopathy. NEUROLOGIC: Alert and oriented x 3. Groslly non-focal. Motor strength is 5+/5 in all muscle groups. The patient has a normal sensorium globally.       DATA:    Old records have been reviewed    CBC:  Recent Labs     12/04/20 1841 12/06/20  0656 12/07/20  0547   WBC 5.5 5.8 7.5   RBC 4.80 4.75 4.70   HGB 13.4 13.4 13.2   HCT 41.5 41.3 40.6    256 276   MCV 86.4 87.0 86.3   MCH 27.9 28.2 28.1   MCHC 32.4 32.4 32.6   RDW 15.3 15.3 14.9      BMP:  Recent Labs     12/04/20  1841 12/06/20  0656 12/07/20  0547    140 141   K 4.2 4.3 4.3    109 107   CO2 20* 22 23   BUN 15 23* 27*   CREATININE 0.7 0.7 0.6   CALCIUM 8.6 9.0 8.4   GLUCOSE 239* 127* 131*      ABG:  No results for input(s): PHART, QXG0BZI, PO2ART, OLM8ASN, D1BWWXMJ, BEART in the last 72 hours. No results found for: BNP  Lab Results   Component Value Date    TROPONINI <0.01 12/04/2020       Lab Results   Component Value Date    FERRITIN 485.7 (H) 12/04/2020     Lab Results   Component Value Date    DDIMER 210 12/07/2020     Lab Results   Component Value Date    CRP 99.0 (H) 12/04/2020           Cultures:     Abx:    Radiology Review:  All pertinent images / reports were reviewed as a part of this visit. Assessment:     1. Acute hypoxemic respiratory failure, slowly improving  2. COVID-19 pneumonia  3. Moderate persistent asthma    I have reviewed laboratories, medical records and images for this visit  Chest imaging on admission had revealed extensive bilateral airspace disease. Will repeat chest x-ray tomorrow morning.   Currently on IV Decadron 20 mg day 4  On remdesivir day 4  Also received 1 unit convalescent plasma  Continue Symbicort and DuoNeb  Continue Singulair  Get patient out of bed into the chair  Titrate FiO2 to maintain SPO2 between 88 to 92%    Will continue to follow         Latrice Perez MD   Pulmonary Critical Care and Sleep Medicine  [employer]   Brayan Lorenzo, Carleen Cedeño, [address]  12/4/2020, 11:56 AM

## 2020-12-08 ENCOUNTER — APPOINTMENT (OUTPATIENT)
Dept: GENERAL RADIOLOGY | Age: 50
DRG: 177 | End: 2020-12-08
Payer: COMMERCIAL

## 2020-12-08 LAB
A/G RATIO: 0.9 (ref 1.1–2.2)
ALBUMIN SERPL-MCNC: 3.4 G/DL (ref 3.4–5)
ALP BLD-CCNC: 109 U/L (ref 40–129)
ALT SERPL-CCNC: 36 U/L (ref 10–40)
ANION GAP SERPL CALCULATED.3IONS-SCNC: 10 MMOL/L (ref 3–16)
APTT: 24.6 SEC (ref 24.2–36.2)
AST SERPL-CCNC: 19 U/L (ref 15–37)
BASOPHILS ABSOLUTE: 0 K/UL (ref 0–0.2)
BASOPHILS RELATIVE PERCENT: 0 %
BILIRUB SERPL-MCNC: <0.2 MG/DL (ref 0–1)
BUN BLDV-MCNC: 28 MG/DL (ref 7–20)
CALCIUM SERPL-MCNC: 8.7 MG/DL (ref 8.3–10.6)
CHLORIDE BLD-SCNC: 106 MMOL/L (ref 99–110)
CO2: 23 MMOL/L (ref 21–32)
CREAT SERPL-MCNC: 0.5 MG/DL (ref 0.6–1.1)
D DIMER: 238 NG/ML DDU (ref 0–229)
EOSINOPHILS ABSOLUTE: 0 K/UL (ref 0–0.6)
EOSINOPHILS RELATIVE PERCENT: 0 %
FIBRINOGEN: 382 MG/DL (ref 200–397)
GFR AFRICAN AMERICAN: >60
GFR NON-AFRICAN AMERICAN: >60
GLOBULIN: 3.6 G/DL
GLUCOSE BLD-MCNC: 143 MG/DL (ref 70–99)
HCT VFR BLD CALC: 42.8 % (ref 36–48)
HEMOGLOBIN: 13.7 G/DL (ref 12–16)
INR BLD: 0.98 (ref 0.86–1.14)
LYMPHOCYTES ABSOLUTE: 0.6 K/UL (ref 1–5.1)
LYMPHOCYTES RELATIVE PERCENT: 6.2 %
MCH RBC QN AUTO: 27.7 PG (ref 26–34)
MCHC RBC AUTO-ENTMCNC: 32 G/DL (ref 31–36)
MCV RBC AUTO: 86.5 FL (ref 80–100)
MONOCYTES ABSOLUTE: 0.8 K/UL (ref 0–1.3)
MONOCYTES RELATIVE PERCENT: 8.1 %
NEUTROPHILS ABSOLUTE: 8.3 K/UL (ref 1.7–7.7)
NEUTROPHILS RELATIVE PERCENT: 85.7 %
PDW BLD-RTO: 15 % (ref 12.4–15.4)
PLATELET # BLD: 293 K/UL (ref 135–450)
PMV BLD AUTO: 7.8 FL (ref 5–10.5)
POTASSIUM REFLEX MAGNESIUM: 4.1 MMOL/L (ref 3.5–5.1)
PROTHROMBIN TIME: 11.4 SEC (ref 10–13.2)
RBC # BLD: 4.96 M/UL (ref 4–5.2)
SODIUM BLD-SCNC: 139 MMOL/L (ref 136–145)
TOTAL PROTEIN: 7 G/DL (ref 6.4–8.2)
WBC # BLD: 9.6 K/UL (ref 4–11)

## 2020-12-08 PROCEDURE — 71045 X-RAY EXAM CHEST 1 VIEW: CPT

## 2020-12-08 PROCEDURE — 1200000000 HC SEMI PRIVATE

## 2020-12-08 PROCEDURE — 85025 COMPLETE CBC W/AUTO DIFF WBC: CPT

## 2020-12-08 PROCEDURE — 2580000003 HC RX 258: Performed by: INTERNAL MEDICINE

## 2020-12-08 PROCEDURE — 2500000003 HC RX 250 WO HCPCS: Performed by: INTERNAL MEDICINE

## 2020-12-08 PROCEDURE — 6360000002 HC RX W HCPCS: Performed by: INTERNAL MEDICINE

## 2020-12-08 PROCEDURE — 6370000000 HC RX 637 (ALT 250 FOR IP): Performed by: INTERNAL MEDICINE

## 2020-12-08 PROCEDURE — 99233 SBSQ HOSP IP/OBS HIGH 50: CPT | Performed by: INTERNAL MEDICINE

## 2020-12-08 PROCEDURE — 85610 PROTHROMBIN TIME: CPT

## 2020-12-08 PROCEDURE — 85384 FIBRINOGEN ACTIVITY: CPT

## 2020-12-08 PROCEDURE — 94640 AIRWAY INHALATION TREATMENT: CPT

## 2020-12-08 PROCEDURE — 80053 COMPREHEN METABOLIC PANEL: CPT

## 2020-12-08 PROCEDURE — 85379 FIBRIN DEGRADATION QUANT: CPT

## 2020-12-08 PROCEDURE — 36415 COLL VENOUS BLD VENIPUNCTURE: CPT

## 2020-12-08 PROCEDURE — 85730 THROMBOPLASTIN TIME PARTIAL: CPT

## 2020-12-08 RX ADMIN — ACETAMINOPHEN 650 MG: 325 TABLET ORAL at 10:37

## 2020-12-08 RX ADMIN — FAMOTIDINE 20 MG: 20 TABLET ORAL at 22:42

## 2020-12-08 RX ADMIN — GUAIFENESIN AND DEXTROMETHORPHAN 5 ML: 100; 10 SYRUP ORAL at 10:37

## 2020-12-08 RX ADMIN — FAMOTIDINE 20 MG: 20 TABLET ORAL at 09:15

## 2020-12-08 RX ADMIN — Medication 2 PUFF: at 12:25

## 2020-12-08 RX ADMIN — GUAIFENESIN AND DEXTROMETHORPHAN 5 ML: 100; 10 SYRUP ORAL at 17:03

## 2020-12-08 RX ADMIN — ENOXAPARIN SODIUM 40 MG: 40 INJECTION SUBCUTANEOUS at 22:42

## 2020-12-08 RX ADMIN — Medication 10 ML: at 22:42

## 2020-12-08 RX ADMIN — Medication 2 PUFF: at 15:39

## 2020-12-08 RX ADMIN — Medication 2 PUFF: at 21:09

## 2020-12-08 RX ADMIN — REMDESIVIR 100 MG: 5 INJECTION INTRAVENOUS at 17:03

## 2020-12-08 RX ADMIN — GUAIFENESIN AND DEXTROMETHORPHAN 5 ML: 100; 10 SYRUP ORAL at 04:21

## 2020-12-08 RX ADMIN — CHOLECALCIFEROL TAB 125 MCG (5000 UNIT) 5000 UNITS: 125 TAB at 09:15

## 2020-12-08 RX ADMIN — Medication 2 PUFF: at 21:10

## 2020-12-08 RX ADMIN — DEXAMETHASONE SODIUM PHOSPHATE 20 MG: 4 INJECTION, SOLUTION INTRA-ARTICULAR; INTRALESIONAL; INTRAMUSCULAR; INTRAVENOUS; SOFT TISSUE at 09:19

## 2020-12-08 RX ADMIN — Medication 2 PUFF: at 12:24

## 2020-12-08 RX ADMIN — Medication 2 PUFF: at 21:08

## 2020-12-08 RX ADMIN — HYDROCHLOROTHIAZIDE 25 MG: 25 TABLET ORAL at 09:15

## 2020-12-08 RX ADMIN — Medication 10 ML: at 09:15

## 2020-12-08 RX ADMIN — ACETAMINOPHEN 650 MG: 325 TABLET ORAL at 17:03

## 2020-12-08 RX ADMIN — ACETAMINOPHEN 650 MG: 325 TABLET ORAL at 04:22

## 2020-12-08 RX ADMIN — LORAZEPAM 0.5 MG: 0.5 TABLET ORAL at 22:42

## 2020-12-08 RX ADMIN — GUAIFENESIN AND DEXTROMETHORPHAN 5 ML: 100; 10 SYRUP ORAL at 22:41

## 2020-12-08 RX ADMIN — MONTELUKAST SODIUM 10 MG: 10 TABLET, COATED ORAL at 22:42

## 2020-12-08 RX ADMIN — ENOXAPARIN SODIUM 40 MG: 40 INJECTION SUBCUTANEOUS at 09:15

## 2020-12-08 ASSESSMENT — PAIN SCALES - GENERAL
PAINLEVEL_OUTOF10: 0
PAINLEVEL_OUTOF10: 4
PAINLEVEL_OUTOF10: 0
PAINLEVEL_OUTOF10: 3
PAINLEVEL_OUTOF10: 0
PAINLEVEL_OUTOF10: 0
PAINLEVEL_OUTOF10: 4

## 2020-12-08 ASSESSMENT — PAIN DESCRIPTION - ORIENTATION: ORIENTATION: LOWER

## 2020-12-08 ASSESSMENT — PAIN DESCRIPTION - PAIN TYPE
TYPE: ACUTE PAIN

## 2020-12-08 ASSESSMENT — PAIN DESCRIPTION - DESCRIPTORS
DESCRIPTORS: HEADACHE
DESCRIPTORS: DISCOMFORT

## 2020-12-08 ASSESSMENT — PULMONARY FUNCTION TESTS
PEFR_L/MIN: 140
PEFR_L/MIN: 140

## 2020-12-08 ASSESSMENT — PAIN DESCRIPTION - LOCATION
LOCATION: HEAD
LOCATION: BACK

## 2020-12-08 ASSESSMENT — PAIN DESCRIPTION - ONSET: ONSET: ON-GOING

## 2020-12-08 ASSESSMENT — PAIN DESCRIPTION - PROGRESSION: CLINICAL_PROGRESSION: NOT CHANGED

## 2020-12-08 ASSESSMENT — PAIN DESCRIPTION - FREQUENCY: FREQUENCY: CONTINUOUS

## 2020-12-08 ASSESSMENT — PAIN - FUNCTIONAL ASSESSMENT: PAIN_FUNCTIONAL_ASSESSMENT: ACTIVITIES ARE NOT PREVENTED

## 2020-12-08 NOTE — PLAN OF CARE
Problem: Airway Clearance - Ineffective  Goal: Achieve or maintain patent airway  12/7/2020 2145 by Fela Hensley RN  Outcome: Ongoing  12/7/2020 1116 by Fela Hensley RN  Outcome: Ongoing     Problem: Gas Exchange - Impaired  Goal: Absence of hypoxia  12/7/2020 2145 by Fela Hensley RN  Outcome: Ongoing  12/7/2020 1116 by Fela Hensley RN  Outcome: Ongoing     Problem:  Body Temperature -  Risk of, Imbalanced  Goal: Ability to maintain a body temperature within defined limits  Outcome: Ongoing

## 2020-12-08 NOTE — FLOWSHEET NOTE
12/08/20 0900   Vital Signs   Temp 98.3 °F (36.8 °C)   Temp Source Oral   Pulse 60   Heart Rate Source Brachial   Resp 18   /81   BP Location Right upper arm   MAP (mmHg) 97   Patient Position Semi fowlers   Level of Consciousness Alert (0)   MEWS Score 1   Patient Currently in Pain Denies   Pain Assessment   Pain Assessment 0-10   Pain Level 0   Non-Pharmaceutical Pain Intervention(s) Declines   Response to Pain Intervention Patient Satisfied   Oxygen Therapy   SpO2 94 %   Pulse Oximeter Device Mode Intermittent   Pulse Oximeter Device Location Finger   O2 Device High flow nasal cannula   O2 Flow Rate (L/min) 12 L/min     Shift assessment compete. VSS. Pt. Is alert and oriented resting comfortably in bed. Pt. Has no complaints at this time. Pt states she feels much better today and stronger today. Pt still requiring 12L HF nasal cannula and SOB with ambulation. POC discussed with patient and patient states understanding and is in agreement with plan. Call light and bedside table within reach. Will continue to monitor.  Gardenia Colon

## 2020-12-08 NOTE — PLAN OF CARE
Problem: Airway Clearance - Ineffective  Goal: Achieve or maintain patent airway  12/8/2020 0127 by Braulio Carranza RN  Outcome: Met This Shift     Problem: Gas Exchange - Impaired  Goal: Absence of hypoxia  12/8/2020 0127 by Braulio Carranza RN  Outcome: Met This Shift  Note: Pt on 12 L via HFNC, O2 sats >90%. Problem: Breathing Pattern - Ineffective  Goal: Ability to achieve and maintain a regular respiratory rate  Outcome: Met This Shift  Note: RR <30 bpm, unlabored, normal depth. Problem: Body Temperature -  Risk of, Imbalanced  Goal: Ability to maintain a body temperature within defined limits  Outcome: Met This Shift  Note: Afebrile overnight. Problem: Isolation Precautions - Risk of Spread of Infection  Goal: Prevent transmission of infection  Outcome: Met This Shift  Note: Pt in droplet plus isolation. Problem: Daily Care:  Goal: Daily care needs are met  Description: Daily care needs are met  Outcome: Met This Shift     Problem: Skin Integrity:  Goal: Skin integrity will stabilize  Description: Skin integrity will stabilize  Outcome: Met This Shift  Note: No breakdown noted on this shift. Pt repositions self in bed frequently. Pt continent and calls appropriately. Will monitor.

## 2020-12-08 NOTE — PROGRESS NOTES
[facility] Pulmonary and Critical Care  Progress note      Reason for Consult: Acute hypoxemic respiratory failure, COVID-19 pneumonia    Subjective:   CHIEF COMPLAINT / HPI:                The patient is a 53 y.o. female with significant past medical history of:      Diagnosis Date    Asthma      Interval history: Patient lying in bed in no apparent respiratory distress. Continues to report improvement in her breathing. Still remains on high flow nasal cannula at 12 L/min. Oxygen saturation remaining in mid 90s.         Past Surgical History:        Procedure Laterality Date    CARPAL TUNNEL RELEASE      2 ON EACH SIDE    HERNIA REPAIR      X2    HYSTERECTOMY       Current Medications:    Current Facility-Administered Medications: albuterol sulfate  (90 Base) MCG/ACT inhaler 2 puff, 2 puff, Inhalation, 4x Daily  hydroCHLOROthiazide (HYDRODIURIL) tablet 25 mg, 25 mg, Oral, Daily  LORazepam (ATIVAN) tablet 0.5 mg, 0.5 mg, Oral, Q6H PRN  albuterol sulfate  (90 Base) MCG/ACT inhaler 2 puff, 2 puff, Inhalation, Q4H PRN  ipratropium (ATROVENT HFA) 17 MCG/ACT inhaler 2 puff, 2 puff, Inhalation, Q4H PRN  budesonide-formoterol (SYMBICORT) 160-4.5 MCG/ACT inhaler 2 puff, 2 puff, Inhalation, BID  montelukast (SINGULAIR) tablet 10 mg, 10 mg, Oral, Nightly  sodium chloride flush 0.9 % injection 10 mL, 10 mL, Intravenous, 2 times per day  sodium chloride flush 0.9 % injection 10 mL, 10 mL, Intravenous, PRN  acetaminophen (TYLENOL) tablet 650 mg, 650 mg, Oral, Q6H PRN **OR** acetaminophen (TYLENOL) suppository 650 mg, 650 mg, Rectal, Q6H PRN  polyethylene glycol (GLYCOLAX) packet 17 g, 17 g, Oral, Daily PRN  promethazine (PHENERGAN) tablet 12.5 mg, 12.5 mg, Oral, Q6H PRN **OR** ondansetron (ZOFRAN) injection 4 mg, 4 mg, Intravenous, Q6H PRN  famotidine (PEPCID) tablet 20 mg, 20 mg, Oral, BID  vitamin D3 (CHOLECALCIFEROL) tablet 5,000 Units, 5,000 Units, Oral, Daily  guaiFENesin-dextromethorphan (ROBITUSSIN DM) 100-10 MG/5ML syrup 5 mL, 5 mL, Oral, Q4H PRN  ipratropium (ATROVENT HFA) 17 MCG/ACT inhaler 2 puff, 2 puff, Inhalation, 4x daily  dexamethasone (DECADRON) 20 mg in sodium chloride 0.9 % IVPB, 20 mg, Intravenous, Daily  enoxaparin (LOVENOX) injection 40 mg, 40 mg, Subcutaneous, BID  [COMPLETED] remdesivir 200 mg in sodium chloride 0.9 % 250 mL IVPB, 200 mg, Intravenous, Once **FOLLOWED BY** remdesivir 100 mg in sodium chloride 0.9 % 250 mL IVPB, 100 mg, Intravenous, Q24H  0.9 % sodium chloride bolus, 30 mL, Intravenous, PRN    Allergies   Allergen Reactions    Penicillins Rash       Social History:    TOBACCO:   reports that she has never smoked. She has never used smokeless tobacco.  ETOH:   reports current alcohol use. Patient currently lives independently  Environmental/chemical exposure: None known    Family History:   History reviewed. No pertinent family history. REVIEW OF SYSTEMS:    CONSTITUTIONAL:  negative for fevers, chills, diaphoresis, activity change, appetite change, fatigue, night sweats and unexpected weight change.    EYES:  negative for blurred vision, eye discharge, visual disturbance and icterus  HEENT:  negative for hearing loss, tinnitus, ear drainage, sinus pressure, nasal congestion, epistaxis and snoring  RESPIRATORY:  See HPI  CARDIOVASCULAR:  negative for chest pain, palpitations, exertional chest pressure/discomfort, edema, syncope  GASTROINTESTINAL:  negative for nausea, vomiting, diarrhea, constipation, blood in stool and abdominal pain  GENITOURINARY:  negative for frequency, dysuria, urinary incontinence, decreased urine volume, and hematuria  HEMATOLOGIC/LYMPHATIC:  negative for easy bruising, bleeding and lymphadenopathy  ALLERGIC/IMMUNOLOGIC:  negative for recurrent infections, angioedema, anaphylaxis and drug reactions  ENDOCRINE:  negative for weight changes and diabetic symptoms including polyuria, polydipsia and polyphagia  MUSCULOSKELETAL:  negative for  pain, joint swelling, 12/07/20  0547 12/08/20  0514    141 139   K 4.3 4.3 4.1    107 106   CO2 22 23 23   BUN 23* 27* 28*   CREATININE 0.7 0.6 0.5*   CALCIUM 9.0 8.4 8.7   GLUCOSE 127* 131* 143*      ABG:  No results for input(s): PHART, YJE5PRA, PO2ART, RLM0ZBB, L9HVFEPS, BEART in the last 72 hours. No results found for: BNP  Lab Results   Component Value Date    TROPONINI <0.01 12/04/2020       Lab Results   Component Value Date    FERRITIN 485.7 (H) 12/04/2020     Lab Results   Component Value Date    DDIMER 238 (H) 12/08/2020     Lab Results   Component Value Date    CRP 99.0 (H) 12/04/2020           Cultures:     Abx:    Radiology Review:  All pertinent images / reports were reviewed as a part of this visit. Chest x-ray one-view portable done on 12/8/2020 was personally viewed by me and my interpretation is: Bilateral patchy lung infiltrates similar to previous chest imaging. These infiltrates are stable. No pleural effusions or pneumothorax seen. Assessment:     1. Acute hypoxemic respiratory failure, slowly improving  2. COVID-19 pneumonia  3. Moderate persistent asthma    I have reviewed laboratories, medical records and images for this visit  Chest imaging on admission had revealed extensive bilateral airspace disease. Repeat chest x-ray done today shows stable lung infiltrates. Currently on IV Decadron 20 mg day 5. We will decrease the dosage to 10 mg IV daily by tomorrow. On remdesivir day 5. Will finish the course today.   Also received 1 unit convalescent plasma  Continue Symbicort and DuoNeb  Continue Singulair  Get patient out of bed into the chair  Titrate FiO2 to maintain SPO2 between 88 to 92%    Will continue to follow         Kingsley Renae MD   Pulmonary Critical Care and Sleep Medicine  [facility] acute hospital   Brayan Lorenzo, Carleen Cedeño, [address]  12/4/2020, 11:31 AM

## 2020-12-08 NOTE — PROGRESS NOTES
Hospitalist Progress Note      PCP: Aure Bernal MD    Date of Admission: 12/4/2020    Chief Complaint: Dyspnea    Hospital Course: This 36-year-old female with history of severe persistent asthma presented for evaluation of dyspnea. Pt was  recently diagnosed with COVID-19 infection. On admission, pt was hypoxic and started on 5 L of nasal cannula. Later O2 requirement increase 15L and pt was started on remdesivir, corticosteroids and convalescent plasma per pilmonary recs. Initially admitted on hich and went up to 15, however, has come down to 10 L at this time. Interval history:   Overnight events noted  Remained on 12 L HFNC satting around 94%  Afebrile, WBCs within normal limits  S/p convalescent plasma; on day 5/5 of remdesivir and high-dose Decadron  Will plan to switch Decadron to 10 mg tomorrow    Due to the current efforts to prevent transmission of COVID-19 and also the need to preserve PPE for other caregivers, a face-to-face encounter with the patient was not performed. That being said, all relevant records and diagnostic tests were reviewed, including laboratory results and imaging. Please reference any relevant documentation elsewhere.       Medications:  Reviewed    Infusion Medications   Scheduled Medications    albuterol sulfate HFA  2 puff Inhalation 4x Daily    hydroCHLOROthiazide  25 mg Oral Daily    budesonide-formoterol  2 puff Inhalation BID    montelukast  10 mg Oral Nightly    sodium chloride flush  10 mL Intravenous 2 times per day    famotidine  20 mg Oral BID    vitamin D3  5,000 Units Oral Daily    ipratropium  2 puff Inhalation 4x daily    dexamethasone (DECADRON) IVPB  20 mg Intravenous Daily    enoxaparin  40 mg Subcutaneous BID    remdesivir IVPB  100 mg Intravenous Q24H     PRN Meds: LORazepam, albuterol sulfate HFA, ipratropium, sodium chloride flush, acetaminophen **OR** acetaminophen, polyethylene glycol, promethazine **OR** ondansetron, guaiFENesin-dextromethorphan, sodium chloride      Intake/Output Summary (Last 24 hours) at 12/8/2020 1010  Last data filed at 12/8/2020 0415  Gross per 24 hour   Intake 480 ml   Output 900 ml   Net -420 ml         /81   Pulse 60   Temp 98.3 °F (36.8 °C) (Oral)   Resp 18   Ht 5' 6\" (1.676 m)   Wt (!) 327 lb 1.6 oz (148.4 kg)   SpO2 94%   BMI 52.80 kg/m²     Physical Exam  Vitals signs and nursing note reviewed. Constitutional:       General: She is not in acute distress. Cardiovascular:      Rate and Rhythm: Normal rate and regular rhythm. Heart sounds: Normal heart sounds. No murmur. No friction rub. No gallop. Pulmonary:      Effort: Pulmonary effort is normal. No respiratory distress. Breath sounds: Normal breath sounds. No stridor  Chest:      Chest wall: No tenderness. Abdominal:      General: Bowel sounds are normal. There is no distension. Neurological:      General: No focal deficit present. Mental Status: She is alert and oriented to person, place, and time. Labs:   Recent Labs     12/06/20  0656 12/07/20  0547 12/08/20  0514   WBC 5.8 7.5 9.6   HGB 13.4 13.2 13.7   HCT 41.3 40.6 42.8    276 293     Recent Labs     12/06/20  0656 12/07/20  0547 12/08/20  0514    141 139   K 4.3 4.3 4.1    107 106   CO2 22 23 23   BUN 23* 27* 28*   CREATININE 0.7 0.6 0.5*   CALCIUM 9.0 8.4 8.7     Recent Labs     12/06/20  0656 12/07/20  0547 12/08/20  0514   AST 16 28 19   ALT 35 30 36   BILITOT <0.2 0.3 <0.2   ALKPHOS 117 102 109     Recent Labs     12/06/20 0656 12/07/20  0547 12/08/20  0514   INR 1.00 0.97 0.98     No results for input(s): Prabhjot Hug in the last 72 hours.     Urinalysis:      Lab Results   Component Value Date    NITRU Negative 12/06/2014    WBCUA 20-50 12/06/2014    BACTERIA 3+ 12/06/2014    RBCUA 0-2 12/06/2014    BLOODU SMALL 12/06/2014    SPECGRAV 1.015 12/06/2014    GLUCOSEU Negative 12/06/2014       Radiology:  XR CHEST

## 2020-12-09 LAB
A/G RATIO: 1 (ref 1.1–2.2)
ALBUMIN SERPL-MCNC: 3.6 G/DL (ref 3.4–5)
ALP BLD-CCNC: 105 U/L (ref 40–129)
ALT SERPL-CCNC: 29 U/L (ref 10–40)
ANION GAP SERPL CALCULATED.3IONS-SCNC: 9 MMOL/L (ref 3–16)
APTT: 26.1 SEC (ref 24.2–36.2)
AST SERPL-CCNC: 12 U/L (ref 15–37)
BASOPHILS ABSOLUTE: 0 K/UL (ref 0–0.2)
BASOPHILS RELATIVE PERCENT: 0.1 %
BILIRUB SERPL-MCNC: 0.4 MG/DL (ref 0–1)
BUN BLDV-MCNC: 27 MG/DL (ref 7–20)
C-REACTIVE PROTEIN: 4.2 MG/L (ref 0–5.1)
CALCIUM SERPL-MCNC: 8.8 MG/DL (ref 8.3–10.6)
CHLORIDE BLD-SCNC: 102 MMOL/L (ref 99–110)
CO2: 26 MMOL/L (ref 21–32)
CREAT SERPL-MCNC: 0.7 MG/DL (ref 0.6–1.1)
D DIMER: <200 NG/ML DDU (ref 0–229)
EOSINOPHILS ABSOLUTE: 0 K/UL (ref 0–0.6)
EOSINOPHILS RELATIVE PERCENT: 0 %
FERRITIN: 444.9 NG/ML (ref 15–150)
FIBRINOGEN: 349 MG/DL (ref 200–397)
GFR AFRICAN AMERICAN: >60
GFR NON-AFRICAN AMERICAN: >60
GLOBULIN: 3.5 G/DL
GLUCOSE BLD-MCNC: 118 MG/DL (ref 70–99)
HCT VFR BLD CALC: 42.6 % (ref 36–48)
HEMOGLOBIN: 14.1 G/DL (ref 12–16)
INR BLD: 0.97 (ref 0.86–1.14)
LACTATE DEHYDROGENASE: 530 U/L (ref 100–190)
LYMPHOCYTES ABSOLUTE: 0.7 K/UL (ref 1–5.1)
LYMPHOCYTES RELATIVE PERCENT: 6.7 %
MCH RBC QN AUTO: 28.3 PG (ref 26–34)
MCHC RBC AUTO-ENTMCNC: 33.2 G/DL (ref 31–36)
MCV RBC AUTO: 85.1 FL (ref 80–100)
MONOCYTES ABSOLUTE: 0.8 K/UL (ref 0–1.3)
MONOCYTES RELATIVE PERCENT: 8.2 %
NEUTROPHILS ABSOLUTE: 8.6 K/UL (ref 1.7–7.7)
NEUTROPHILS RELATIVE PERCENT: 85 %
PDW BLD-RTO: 15 % (ref 12.4–15.4)
PLATELET # BLD: 303 K/UL (ref 135–450)
PMV BLD AUTO: 7.9 FL (ref 5–10.5)
POTASSIUM REFLEX MAGNESIUM: 4.3 MMOL/L (ref 3.5–5.1)
PROTHROMBIN TIME: 11.2 SEC (ref 10–13.2)
RBC # BLD: 5 M/UL (ref 4–5.2)
SEDIMENTATION RATE, ERYTHROCYTE: 65 MM/HR (ref 0–20)
SODIUM BLD-SCNC: 137 MMOL/L (ref 136–145)
TOTAL PROTEIN: 7.1 G/DL (ref 6.4–8.2)
WBC # BLD: 10.2 K/UL (ref 4–11)

## 2020-12-09 PROCEDURE — 36415 COLL VENOUS BLD VENIPUNCTURE: CPT

## 2020-12-09 PROCEDURE — 85379 FIBRIN DEGRADATION QUANT: CPT

## 2020-12-09 PROCEDURE — 1200000000 HC SEMI PRIVATE

## 2020-12-09 PROCEDURE — 2580000003 HC RX 258: Performed by: INTERNAL MEDICINE

## 2020-12-09 PROCEDURE — 86140 C-REACTIVE PROTEIN: CPT

## 2020-12-09 PROCEDURE — 2700000000 HC OXYGEN THERAPY PER DAY

## 2020-12-09 PROCEDURE — 80053 COMPREHEN METABOLIC PANEL: CPT

## 2020-12-09 PROCEDURE — 6370000000 HC RX 637 (ALT 250 FOR IP): Performed by: INTERNAL MEDICINE

## 2020-12-09 PROCEDURE — 85025 COMPLETE CBC W/AUTO DIFF WBC: CPT

## 2020-12-09 PROCEDURE — 82728 ASSAY OF FERRITIN: CPT

## 2020-12-09 PROCEDURE — 6360000002 HC RX W HCPCS: Performed by: INTERNAL MEDICINE

## 2020-12-09 PROCEDURE — 94761 N-INVAS EAR/PLS OXIMETRY MLT: CPT

## 2020-12-09 PROCEDURE — 99233 SBSQ HOSP IP/OBS HIGH 50: CPT | Performed by: INTERNAL MEDICINE

## 2020-12-09 PROCEDURE — 85384 FIBRINOGEN ACTIVITY: CPT

## 2020-12-09 PROCEDURE — 83615 LACTATE (LD) (LDH) ENZYME: CPT

## 2020-12-09 PROCEDURE — 85610 PROTHROMBIN TIME: CPT

## 2020-12-09 PROCEDURE — 94640 AIRWAY INHALATION TREATMENT: CPT

## 2020-12-09 PROCEDURE — 85652 RBC SED RATE AUTOMATED: CPT

## 2020-12-09 PROCEDURE — 85730 THROMBOPLASTIN TIME PARTIAL: CPT

## 2020-12-09 RX ADMIN — GUAIFENESIN AND DEXTROMETHORPHAN 5 ML: 100; 10 SYRUP ORAL at 07:01

## 2020-12-09 RX ADMIN — ENOXAPARIN SODIUM 40 MG: 40 INJECTION SUBCUTANEOUS at 09:26

## 2020-12-09 RX ADMIN — Medication 2 PUFF: at 17:03

## 2020-12-09 RX ADMIN — Medication 2 PUFF: at 19:51

## 2020-12-09 RX ADMIN — DEXAMETHASONE SODIUM PHOSPHATE 20 MG: 4 INJECTION, SOLUTION INTRA-ARTICULAR; INTRALESIONAL; INTRAMUSCULAR; INTRAVENOUS; SOFT TISSUE at 09:26

## 2020-12-09 RX ADMIN — Medication 10 ML: at 09:26

## 2020-12-09 RX ADMIN — HYDROCHLOROTHIAZIDE 25 MG: 25 TABLET ORAL at 09:26

## 2020-12-09 RX ADMIN — ACETAMINOPHEN 650 MG: 325 TABLET ORAL at 07:01

## 2020-12-09 RX ADMIN — ACETAMINOPHEN 650 MG: 325 TABLET ORAL at 20:41

## 2020-12-09 RX ADMIN — Medication 2 PUFF: at 12:01

## 2020-12-09 RX ADMIN — ACETAMINOPHEN 650 MG: 325 TABLET ORAL at 00:59

## 2020-12-09 RX ADMIN — GUAIFENESIN AND DEXTROMETHORPHAN 5 ML: 100; 10 SYRUP ORAL at 13:39

## 2020-12-09 RX ADMIN — GUAIFENESIN AND DEXTROMETHORPHAN 5 ML: 100; 10 SYRUP ORAL at 20:41

## 2020-12-09 RX ADMIN — CHOLECALCIFEROL TAB 125 MCG (5000 UNIT) 5000 UNITS: 125 TAB at 09:27

## 2020-12-09 RX ADMIN — FAMOTIDINE 20 MG: 20 TABLET ORAL at 20:41

## 2020-12-09 RX ADMIN — ENOXAPARIN SODIUM 40 MG: 40 INJECTION SUBCUTANEOUS at 20:41

## 2020-12-09 RX ADMIN — FAMOTIDINE 20 MG: 20 TABLET ORAL at 09:26

## 2020-12-09 RX ADMIN — Medication 2 PUFF: at 07:55

## 2020-12-09 RX ADMIN — MONTELUKAST SODIUM 10 MG: 10 TABLET, COATED ORAL at 20:42

## 2020-12-09 RX ADMIN — ACETAMINOPHEN 650 MG: 325 TABLET ORAL at 13:39

## 2020-12-09 RX ADMIN — Medication 10 ML: at 20:42

## 2020-12-09 ASSESSMENT — PAIN - FUNCTIONAL ASSESSMENT
PAIN_FUNCTIONAL_ASSESSMENT: ACTIVITIES ARE NOT PREVENTED
PAIN_FUNCTIONAL_ASSESSMENT: ACTIVITIES ARE NOT PREVENTED

## 2020-12-09 ASSESSMENT — PAIN SCALES - GENERAL
PAINLEVEL_OUTOF10: 0
PAINLEVEL_OUTOF10: 2
PAINLEVEL_OUTOF10: 1
PAINLEVEL_OUTOF10: 0
PAINLEVEL_OUTOF10: 1
PAINLEVEL_OUTOF10: 1

## 2020-12-09 ASSESSMENT — PAIN DESCRIPTION - ONSET
ONSET: ON-GOING
ONSET: ON-GOING

## 2020-12-09 ASSESSMENT — PAIN DESCRIPTION - DESCRIPTORS
DESCRIPTORS: HEADACHE
DESCRIPTORS: HEADACHE

## 2020-12-09 ASSESSMENT — PAIN DESCRIPTION - PAIN TYPE
TYPE: ACUTE PAIN

## 2020-12-09 ASSESSMENT — PAIN DESCRIPTION - LOCATION
LOCATION: HEAD

## 2020-12-09 ASSESSMENT — PAIN DESCRIPTION - PROGRESSION
CLINICAL_PROGRESSION: NOT CHANGED
CLINICAL_PROGRESSION: NOT CHANGED

## 2020-12-09 ASSESSMENT — PULMONARY FUNCTION TESTS: PEFR_L/MIN: 90

## 2020-12-09 ASSESSMENT — PAIN DESCRIPTION - FREQUENCY
FREQUENCY: CONTINUOUS
FREQUENCY: CONTINUOUS

## 2020-12-09 NOTE — PLAN OF CARE
Problem: Gas Exchange - Impaired  Goal: Absence of hypoxia  Outcome: Met This Shift  Note: Pt on cont pulse ox, satting in high 90s on 12 L via HFNC. Problem: Breathing Pattern - Ineffective  Goal: Ability to achieve and maintain a regular respiratory rate  Outcome: Met This Shift     Problem: Body Temperature -  Risk of, Imbalanced  Goal: Ability to maintain a body temperature within defined limits  Outcome: Met This Shift  Note: Afebrile overnight.      Problem: Isolation Precautions - Risk of Spread of Infection  Goal: Prevent transmission of infection  Outcome: Met This Shift  Note: Pt in droplet plus isolation

## 2020-12-09 NOTE — PROGRESS NOTES
Hospitalist Progress Note      PCP: Alvaro Travis MD    Date of Admission: 12/4/2020    Chief Complaint: Dyspnea    Hospital Course: This 52-year-old female with history of severe persistent asthma presented for evaluation of dyspnea. Pt was  recently diagnosed with COVID-19 infection. On admission, pt was hypoxic and started on 5 L of nasal cannula. Later O2 requirement increase 15L and pt was started [facility] remdesivir, corticosteroids [facility] Initially admitted on hich and went up to 15, however, has come down to 10 L at this time. Interval history:   Overnight events noted  Continues to remain on 12 L HFNC satting around 94%  S/p convalescent plasma and 5-day course of remdesivir  On high-dose dexamethasone day 6/10  Afebrile, WBCs within normal limits      Due to the current efforts to prevent transmission of COVID-19 and also the need to preserve PPE for other caregivers, a face-to-face encounter with the patient was not performed. That being said, all relevant records and diagnostic tests were reviewed, including laboratory results and imaging. Please reference any relevant documentation elsewhere.       Medications:  Reviewed    Infusion Medications   Scheduled Medications    albuterol sulfate HFA  2 puff Inhalation 4x Daily    hydroCHLOROthiazide  25 mg Oral Daily    budesonide-formoterol  2 puff Inhalation BID    montelukast  10 mg Oral Nightly    sodium chloride flush  10 mL Intravenous 2 times per day    famotidine  20 mg Oral BID    vitamin D3  5,000 Units Oral Daily    ipratropium  2 puff Inhalation 4x daily    dexamethasone (DECADRON) IVPB  20 mg Intravenous Daily    enoxaparin  40 mg Subcutaneous BID     PRN Meds: LORazepam, albuterol sulfate HFA, ipratropium, sodium chloride flush, acetaminophen **OR** acetaminophen, polyethylene glycol, promethazine **OR** ondansetron, guaiFENesin-dextromethorphan, sodium chloride      Intake/Output Summary (Last 24 hours) at 12/9/2020 1004  Last data filed at 12/8/2020 2242  Gross per 24 hour   Intake 480 ml   Output 2700 ml   Net -2220 ml         /83   Pulse 57   Temp 97.8 °F (36.6 °C) (Oral)   Resp 18   Ht 5' 6\" (1.676 m)   Wt (!) 327 lb 1.6 oz (148.4 kg)   SpO2 94%   BMI 52.80 kg/m²     Physical Exam  Vitals signs and nursing note reviewed. Constitutional:       General: She is not in acute distress. Cardiovascular:      Rate and Rhythm: Normal rate and regular rhythm. Heart sounds: Normal heart sounds. No murmur. No friction rub. No gallop. Pulmonary:      Effort: Pulmonary effort is normal. No respiratory distress. Breath sounds: Normal breath sounds. No stridor  Chest:      Chest wall: No tenderness. Abdominal:      General: Bowel sounds are normal. There is no distension. Neurological:      General: No focal deficit present. Mental Status: She is alert and oriented to person, place, and time. Labs:   Recent Labs     12/07/20  0547 12/08/20  0514 12/09/20 0619   WBC 7.5 9.6 10.2   HGB 13.2 13.7 14.1   HCT 40.6 42.8 42.6    293 303     Recent Labs     12/07/20  0547 12/08/20  0514 12/09/20 0619    139 137   K 4.3 4.1 4.3    106 102   CO2 23 23 26   BUN 27* 28* 27*   CREATININE 0.6 0.5* 0.7   CALCIUM 8.4 8.7 8.8     Recent Labs     12/07/20  0547 12/08/20  0514 12/09/20 0619   AST 28 19 12*   ALT 30 36 29   BILITOT 0.3 <0.2 0.4   ALKPHOS 102 109 105     Recent Labs     12/07/20  0547 12/08/20  0514 12/09/20 0619   INR 0.97 0.98 0.97     No results for input(s): CKTOTAL, TROPONINI in the last 72 hours.     Urinalysis:      Lab Results   Component Value Date    NITRU Negative 12/06/2014    WBCUA 20-50 12/06/2014    BACTERIA 3+ 12/06/2014    RBCUA 0-2 12/06/2014    BLOODU SMALL 12/06/2014    SPECGRAV 1.015 12/06/2014    GLUCOSEU Negative 12/06/2014       Radiology:  XR CHEST PORTABLE   Final Result      XR CHEST PORTABLE   Final Result

## 2020-12-09 NOTE — PROGRESS NOTES
[facility] Pulmonary and Critical Care  Progress note      Reason for Consult: Acute hypoxemic respiratory failure, COVID-19 pneumonia    Subjective:   CHIEF COMPLAINT / HPI:                The patient is a 53 y.o. female with significant past medical history of:      Diagnosis Date    Asthma      Interval history: Patient lying in bed in no apparent respiratory distress. Eating her breakfast.  Reports that her breathing is slowly improving. Continues on 12 L/min of oxygen supplementation via nasal cannula. Oxygen saturation remaining in mid 90s.         Past Surgical History:        Procedure Laterality Date    CARPAL TUNNEL RELEASE      2 ON EACH SIDE    HERNIA REPAIR      X2    HYSTERECTOMY       Current Medications:    Current Facility-Administered Medications: albuterol sulfate  (90 Base) MCG/ACT inhaler 2 puff, 2 puff, Inhalation, 4x Daily  hydroCHLOROthiazide (HYDRODIURIL) tablet 25 mg, 25 mg, Oral, Daily  LORazepam (ATIVAN) tablet 0.5 mg, 0.5 mg, Oral, Q6H PRN  albuterol sulfate  (90 Base) MCG/ACT inhaler 2 puff, 2 puff, Inhalation, Q4H PRN  ipratropium (ATROVENT HFA) 17 MCG/ACT inhaler 2 puff, 2 puff, Inhalation, Q4H PRN  budesonide-formoterol (SYMBICORT) 160-4.5 MCG/ACT inhaler 2 puff, 2 puff, Inhalation, BID  montelukast (SINGULAIR) tablet 10 mg, 10 mg, Oral, Nightly  sodium chloride flush 0.9 % injection 10 mL, 10 mL, Intravenous, 2 times per day  sodium chloride flush 0.9 % injection 10 mL, 10 mL, Intravenous, PRN  acetaminophen (TYLENOL) tablet 650 mg, 650 mg, Oral, Q6H PRN **OR** acetaminophen (TYLENOL) suppository 650 mg, 650 mg, Rectal, Q6H PRN  polyethylene glycol (GLYCOLAX) packet 17 g, 17 g, Oral, Daily PRN  promethazine (PHENERGAN) tablet 12.5 mg, 12.5 mg, Oral, Q6H PRN **OR** ondansetron (ZOFRAN) injection 4 mg, 4 mg, Intravenous, Q6H PRN  famotidine (PEPCID) tablet 20 mg, 20 mg, Oral, BID  vitamin D3 (CHOLECALCIFEROL) tablet 5,000 Units, 5,000 Units, Oral, Daily  guaiFENesin-dextromethorphan (ROBITUSSIN DM) 100-10 MG/5ML syrup 5 mL, 5 mL, Oral, Q4H PRN  ipratropium (ATROVENT HFA) 17 MCG/ACT inhaler 2 puff, 2 puff, Inhalation, 4x daily  dexamethasone (DECADRON) 20 mg in sodium chloride 0.9 % IVPB, 20 mg, Intravenous, Daily  enoxaparin (LOVENOX) injection 40 mg, 40 mg, Subcutaneous, BID  0.9 % sodium chloride bolus, 30 mL, Intravenous, PRN    Allergies   Allergen Reactions    Penicillins Rash       Social History:    TOBACCO:   reports that she has never smoked. She has never used smokeless tobacco.  ETOH:   reports current alcohol use. Patient currently lives independently  Environmental/chemical exposure: None known    Family History:   History reviewed. No pertinent family history. REVIEW OF SYSTEMS:    CONSTITUTIONAL:  negative for fevers, chills, diaphoresis, activity change, appetite change, fatigue, night sweats and unexpected weight change.    EYES:  negative for blurred vision, eye discharge, visual disturbance and icterus  HEENT:  negative for hearing loss, tinnitus, ear drainage, sinus pressure, nasal congestion, epistaxis and snoring  RESPIRATORY:  See HPI  CARDIOVASCULAR:  negative for chest pain, palpitations, exertional chest pressure/discomfort, edema, syncope  GASTROINTESTINAL:  negative for nausea, vomiting, diarrhea, constipation, blood in stool and abdominal pain  GENITOURINARY:  negative for frequency, dysuria, urinary incontinence, decreased urine volume, and hematuria  HEMATOLOGIC/LYMPHATIC:  negative for easy bruising, bleeding and lymphadenopathy  ALLERGIC/IMMUNOLOGIC:  negative for recurrent infections, angioedema, anaphylaxis and drug reactions  ENDOCRINE:  negative for weight changes and diabetic symptoms including polyuria, polydipsia and polyphagia  MUSCULOSKELETAL:  negative for  pain, joint swelling, decreased range of motion and muscle weakness  NEUROLOGICAL:  negative for headaches, slurred speech, unilateral weakness  PSYCHIATRIC/BEHAVIORAL: negative for hallucinations, behavioral problems, confusion and agitation. Objective:   PHYSICAL EXAM:      VITALS:  BP (!) 167/88   Pulse 60   Temp 98.1 °F (36.7 °C) (Oral)   Resp 18   Ht 5' 6\" (1.676 m)   Wt (!) 327 lb 1.6 oz (148.4 kg)   SpO2 93%   BMI 52.80 kg/m²      24HR INTAKE/OUTPUT:      Intake/Output Summary (Last 24 hours) at 12/9/2020 1350  Last data filed at 12/8/2020 2242  Gross per 24 hour   Intake 240 ml   Output 1500 ml   Net -1260 ml     PHYSICAL EXAM:     CONSTITUTIONAL: She is a 53y.o.-year-old who appears her stated age. She is alert and oriented x 3 and in no acute distress. HEENT: PERRLA, EOMI. No scleral icterus. No thrush, atraumatic, normocephalic. NECK: Supple, without cervical or supraclavicular lymphadenopathy:  CARDIOVASCULAR: Deferred due to full PPE precautions  RESPIRATORY & CHEST: Deferred due to full PPE precautions  GASTROINTESTINAL & ABDOMEN: Soft, nontender,  non-distended, without hepatosplenomegaly. GENITOURINARY: Deferred. MUSCULOSKELETAL: No tenderness to palpation of the axial skeleton. There is no clubbing. No cyanosis. No edema of the lower extremities. SKIN OF BODY: No rash or jaundice. PSYCHIATRIC EVALUATION: Normal affect. Patient answers questions appropriately. HEMATOLOGIC/LYMPHATIC/ IMMUNOLOGIC: No palpable lymphadenopathy. NEUROLOGIC: Alert and oriented x 3. Groslly non-focal. Motor strength is 5+/5 in all muscle groups. The patient has a normal sensorium globally.       DATA:    Old records have been reviewed    CBC:  Recent Labs     12/07/20  0547 12/08/20 0514 12/09/20 0619   WBC 7.5 9.6 10.2   RBC 4.70 4.96 5.00   HGB 13.2 13.7 14.1   HCT 40.6 42.8 42.6    293 303   MCV 86.3 86.5 85.1   MCH 28.1 27.7 28.3   MCHC 32.6 32.0 33.2   RDW 14.9 15.0 15.0      BMP:  Recent Labs     12/07/20  0547 12/08/20  0514 12/09/20  0619    139 137   K 4.3 4.1 4.3    106 102   CO2 23 23 26   BUN 27* 28* 27*   CREATININE 0.6 0.5* 0.7   CALCIUM 8.4 8.7 8.8   GLUCOSE 131* 143* 118*      ABG:  No results for input(s): PHART, VKC1SON, PO2ART, HJK3JDS, E3KXLWUJ, BEART in the last 72 hours. No results found for: BNP  Lab Results   Component Value Date    TROPONINI <0.01 12/04/2020       Lab Results   Component Value Date    FERRITIN 485.7 (H) 12/04/2020     Lab Results   Component Value Date    DDIMER <200 12/09/2020     Lab Results   Component Value Date    CRP 4.2 12/09/2020           Cultures:     Abx:    Radiology Review:  All pertinent images / reports were reviewed as a part of this visit. Chest x-ray one-view portable done on 12/8/2020 was personally viewed by me and my interpretation is: Bilateral patchy lung infiltrates similar to previous chest imaging. These infiltrates are stable. No pleural effusions or pneumothorax seen. Assessment:     1. Acute hypoxemic respiratory failure, slowly improving  2. COVID-19 pneumonia  3. Moderate persistent asthma    I have reviewed laboratories, medical records and images for this visit  Chest imaging on admission had revealed extensive bilateral airspace disease. Repeat chest x-ray done today shows stable lung infiltrates. Currently on IV Decadron 20 mg day 6. We will decrease the dosage to 10 mg IV daily today. .  Patient has finished a course of remdesivir. Also received 1 unit convalescent plasma  Continue Symbicort and DuoNeb  Continue Singulair  Get patient out of bed into the chair  FiO2 can be decreased as long as patient is maintaining SPO2 above 89%. Her inflammatory markers are trending down.     Will continue to follow         Josefa Kayser, MD   Pulmonary Critical Care and Sleep Medicine  [address] [facility]   [address] [place] [address]  12/4/2020, 1:50 PM

## 2020-12-10 LAB
A/G RATIO: 1.1 (ref 1.1–2.2)
ALBUMIN SERPL-MCNC: 3.7 G/DL (ref 3.4–5)
ALP BLD-CCNC: 119 U/L (ref 40–129)
ALT SERPL-CCNC: 28 U/L (ref 10–40)
ANION GAP SERPL CALCULATED.3IONS-SCNC: 11 MMOL/L (ref 3–16)
APTT: 26.4 SEC (ref 24.2–36.2)
AST SERPL-CCNC: 12 U/L (ref 15–37)
BASOPHILS ABSOLUTE: 0 K/UL (ref 0–0.2)
BASOPHILS RELATIVE PERCENT: 0 %
BILIRUB SERPL-MCNC: 0.3 MG/DL (ref 0–1)
BUN BLDV-MCNC: 28 MG/DL (ref 7–20)
CALCIUM SERPL-MCNC: 8.7 MG/DL (ref 8.3–10.6)
CHLORIDE BLD-SCNC: 100 MMOL/L (ref 99–110)
CO2: 26 MMOL/L (ref 21–32)
CREAT SERPL-MCNC: 0.7 MG/DL (ref 0.6–1.1)
D DIMER: <200 NG/ML DDU (ref 0–229)
EOSINOPHILS ABSOLUTE: 0 K/UL (ref 0–0.6)
EOSINOPHILS RELATIVE PERCENT: 0 %
FIBRINOGEN: 355 MG/DL (ref 200–397)
GFR AFRICAN AMERICAN: >60
GFR NON-AFRICAN AMERICAN: >60
GLOBULIN: 3.3 G/DL
GLUCOSE BLD-MCNC: 159 MG/DL (ref 70–99)
HCT VFR BLD CALC: 43.5 % (ref 36–48)
HEMOGLOBIN: 14.2 G/DL (ref 12–16)
INR BLD: 0.97 (ref 0.86–1.14)
LYMPHOCYTES ABSOLUTE: 0.8 K/UL (ref 1–5.1)
LYMPHOCYTES RELATIVE PERCENT: 6.8 %
MCH RBC QN AUTO: 27.9 PG (ref 26–34)
MCHC RBC AUTO-ENTMCNC: 32.6 G/DL (ref 31–36)
MCV RBC AUTO: 85.7 FL (ref 80–100)
MONOCYTES ABSOLUTE: 0.8 K/UL (ref 0–1.3)
MONOCYTES RELATIVE PERCENT: 7.1 %
NEUTROPHILS ABSOLUTE: 9.6 K/UL (ref 1.7–7.7)
NEUTROPHILS RELATIVE PERCENT: 86.1 %
PDW BLD-RTO: 14.3 % (ref 12.4–15.4)
PLATELET # BLD: 287 K/UL (ref 135–450)
PMV BLD AUTO: 8.1 FL (ref 5–10.5)
POTASSIUM REFLEX MAGNESIUM: 4.1 MMOL/L (ref 3.5–5.1)
PROTHROMBIN TIME: 11.3 SEC (ref 10–13.2)
RBC # BLD: 5.08 M/UL (ref 4–5.2)
SODIUM BLD-SCNC: 137 MMOL/L (ref 136–145)
TOTAL PROTEIN: 7 G/DL (ref 6.4–8.2)
WBC # BLD: 11.1 K/UL (ref 4–11)

## 2020-12-10 PROCEDURE — 2580000003 HC RX 258: Performed by: INTERNAL MEDICINE

## 2020-12-10 PROCEDURE — 94640 AIRWAY INHALATION TREATMENT: CPT

## 2020-12-10 PROCEDURE — 85384 FIBRINOGEN ACTIVITY: CPT

## 2020-12-10 PROCEDURE — 2700000000 HC OXYGEN THERAPY PER DAY

## 2020-12-10 PROCEDURE — 6370000000 HC RX 637 (ALT 250 FOR IP): Performed by: INTERNAL MEDICINE

## 2020-12-10 PROCEDURE — 36415 COLL VENOUS BLD VENIPUNCTURE: CPT

## 2020-12-10 PROCEDURE — 6360000002 HC RX W HCPCS: Performed by: INTERNAL MEDICINE

## 2020-12-10 PROCEDURE — 99232 SBSQ HOSP IP/OBS MODERATE 35: CPT | Performed by: INTERNAL MEDICINE

## 2020-12-10 PROCEDURE — 1200000000 HC SEMI PRIVATE

## 2020-12-10 PROCEDURE — 85025 COMPLETE CBC W/AUTO DIFF WBC: CPT

## 2020-12-10 PROCEDURE — 85730 THROMBOPLASTIN TIME PARTIAL: CPT

## 2020-12-10 PROCEDURE — 94761 N-INVAS EAR/PLS OXIMETRY MLT: CPT

## 2020-12-10 PROCEDURE — 85610 PROTHROMBIN TIME: CPT

## 2020-12-10 PROCEDURE — 85379 FIBRIN DEGRADATION QUANT: CPT

## 2020-12-10 PROCEDURE — 80053 COMPREHEN METABOLIC PANEL: CPT

## 2020-12-10 RX ADMIN — Medication 10 ML: at 09:17

## 2020-12-10 RX ADMIN — Medication 2 PUFF: at 08:38

## 2020-12-10 RX ADMIN — Medication 2 PUFF: at 08:39

## 2020-12-10 RX ADMIN — CHOLECALCIFEROL TAB 125 MCG (5000 UNIT) 5000 UNITS: 125 TAB at 09:17

## 2020-12-10 RX ADMIN — Medication 2 PUFF: at 11:58

## 2020-12-10 RX ADMIN — MONTELUKAST SODIUM 10 MG: 10 TABLET, COATED ORAL at 21:13

## 2020-12-10 RX ADMIN — GUAIFENESIN AND DEXTROMETHORPHAN 5 ML: 100; 10 SYRUP ORAL at 09:55

## 2020-12-10 RX ADMIN — DEXAMETHASONE SODIUM PHOSPHATE 10 MG: 4 INJECTION, SOLUTION INTRA-ARTICULAR; INTRALESIONAL; INTRAMUSCULAR; INTRAVENOUS; SOFT TISSUE at 09:16

## 2020-12-10 RX ADMIN — GUAIFENESIN AND DEXTROMETHORPHAN 5 ML: 100; 10 SYRUP ORAL at 21:13

## 2020-12-10 RX ADMIN — ACETAMINOPHEN 650 MG: 325 TABLET ORAL at 16:28

## 2020-12-10 RX ADMIN — Medication 2 PUFF: at 21:20

## 2020-12-10 RX ADMIN — ENOXAPARIN SODIUM 40 MG: 40 INJECTION SUBCUTANEOUS at 09:17

## 2020-12-10 RX ADMIN — Medication 2 PUFF: at 21:19

## 2020-12-10 RX ADMIN — Medication 2 PUFF: at 17:16

## 2020-12-10 RX ADMIN — Medication 10 ML: at 21:14

## 2020-12-10 RX ADMIN — GUAIFENESIN AND DEXTROMETHORPHAN 5 ML: 100; 10 SYRUP ORAL at 16:28

## 2020-12-10 RX ADMIN — HYDROCHLOROTHIAZIDE 25 MG: 25 TABLET ORAL at 09:17

## 2020-12-10 RX ADMIN — ENOXAPARIN SODIUM 40 MG: 40 INJECTION SUBCUTANEOUS at 21:13

## 2020-12-10 RX ADMIN — Medication 2 PUFF: at 11:57

## 2020-12-10 RX ADMIN — ACETAMINOPHEN 650 MG: 325 TABLET ORAL at 23:01

## 2020-12-10 RX ADMIN — FAMOTIDINE 20 MG: 20 TABLET ORAL at 23:00

## 2020-12-10 RX ADMIN — ACETAMINOPHEN 650 MG: 325 TABLET ORAL at 09:55

## 2020-12-10 RX ADMIN — FAMOTIDINE 20 MG: 20 TABLET ORAL at 09:17

## 2020-12-10 ASSESSMENT — PAIN DESCRIPTION - FREQUENCY
FREQUENCY: CONTINUOUS
FREQUENCY: CONTINUOUS

## 2020-12-10 ASSESSMENT — PAIN DESCRIPTION - DESCRIPTORS
DESCRIPTORS: HEADACHE

## 2020-12-10 ASSESSMENT — PAIN DESCRIPTION - PROGRESSION
CLINICAL_PROGRESSION: GRADUALLY IMPROVING
CLINICAL_PROGRESSION: GRADUALLY IMPROVING

## 2020-12-10 ASSESSMENT — PAIN SCALES - GENERAL
PAINLEVEL_OUTOF10: 2
PAINLEVEL_OUTOF10: 2
PAINLEVEL_OUTOF10: 0
PAINLEVEL_OUTOF10: 1
PAINLEVEL_OUTOF10: 0
PAINLEVEL_OUTOF10: 1
PAINLEVEL_OUTOF10: 0
PAINLEVEL_OUTOF10: 0

## 2020-12-10 ASSESSMENT — PAIN DESCRIPTION - ONSET
ONSET: ON-GOING

## 2020-12-10 ASSESSMENT — PAIN DESCRIPTION - PAIN TYPE
TYPE: ACUTE PAIN

## 2020-12-10 ASSESSMENT — PULMONARY FUNCTION TESTS
PEFR_L/MIN: 165
PEFR_L/MIN: 100

## 2020-12-10 ASSESSMENT — PAIN DESCRIPTION - LOCATION
LOCATION: HEAD

## 2020-12-10 NOTE — PROGRESS NOTES
Nutrition Assessment     Type and Reason for Visit: Initial    Nutrition Recommendations/Plan:   No nutrition needs at this time     Nutrition Assessment:  RD triggered for LOS assessment. MST=0 indicating adequate nutrition status prior to admission. No wt loss since admission. Recorded PO intake % at all meals. No wounds present. No nutrition concerns at this time. Will follow at low risk. Consult RD if nutrition status changes. Malnutrition Assessment:  Malnutrition Status: No malnutrition    Nutrition Related Findings: No edema noted. Active BS. Current Nutrition Therapies:    DIET GENERAL; Anthropometric Measures:  · Height: 5' 6\" (167.6 cm)  · Current Body Wt: 327 lb (148.3 kg)   · BMI: 52.8    Nutrition Diagnosis:   No nutrition diagnosis at this time    Nutrition Interventions:   Food and/or Nutrient Delivery:  Continue Current Diet  Nutrition Education/Counseling:  Education not indicated   Coordination of Nutrition Care:  Continue to monitor while inpatient    Goals:  Continued PO intake greater than 50% at all meals.        Nutrition Monitoring and Evaluation:   Behavioral-Environmental Outcomes:  None Identified   Food/Nutrient Intake Outcomes:  Food and Nutrient Intake  Physical Signs/Symptoms Outcomes:  None Identified     Discharge Planning:    No discharge needs at this time     Electronically signed by Jessee Webber RD, LD on 12/10/20 at 2:01 PM EST    Contact: [phone]

## 2020-12-10 NOTE — PLAN OF CARE
Problem: Gas Exchange - Impaired  Goal: Absence of hypoxia  Outcome: Met This Shift  Note: O2 satting >90% on 8 L via NC. Problem: Body Temperature -  Risk of, Imbalanced  Goal: Ability to maintain a body temperature within defined limits  Outcome: Met This Shift  Note: Afebrile overnight. Goal: Will regain or maintain usual level of consciousness  Outcome: Met This Shift  Note: A&Ox4     Problem: Isolation Precautions - Risk of Spread of Infection  Goal: Prevent transmission of infection  Outcome: Met This Shift  Note: Pt in droplet plus isolation. Problem: Pain:  Goal: Control of acute pain  Description: Control of acute pain  Outcome: Met This Shift  Note:    Pt c/o pain. Pt assessed for breathing and BP. Pt educated on pain scale. Medication administered, see MAR. Pt repositioned. Stimuli reduced. Rest promoted. Pain reassessed. Will continue to monitor. Problem: Skin Integrity:  Goal: Skin integrity will stabilize  Description: Skin integrity will stabilize  Outcome: Met This Shift  Note: No breakdown noted on this shift. Pt repositions self in bed frequently. Pt continent and calls appropriately. Will monitor. Problem: Risk for Fluid Volume Deficit  Goal: Maintain normal heart rhythm  Outcome: Ongoing  Note: Pt in SB, rate range 35-60.

## 2020-12-10 NOTE — PROGRESS NOTES
Shift assessment completed. Pt is a/o X4. VSS. POC discussed and all questions answered. Pt has belongings and call light in reach. Pt provided with fresh ice water. Pt provided with IS and instructed on use. Denies further needs, will continue to monitor.

## 2020-12-10 NOTE — PROGRESS NOTES
[facility] Pulmonary and Critical Care  Progress note      Reason for Consult: Acute hypoxemic respiratory failure, COVID-19 pneumonia    Subjective:   CHIEF COMPLAINT / HPI:                The patient is a 53 y.o. female with significant past medical history of:      Diagnosis Date    Asthma      Interval history: Patient sitting in bed in no apparent respiratory distress. Breathing slowly improving. Oxygen requirements decreasing. Now on 6 L/min via nasal cannula saturating in mid 90s.       Past Surgical History:        Procedure Laterality Date    CARPAL TUNNEL RELEASE      2 ON EACH SIDE    HERNIA REPAIR      X2    HYSTERECTOMY       Current Medications:    Current Facility-Administered Medications: dexamethasone (DECADRON) 10 mg in sodium chloride 0.9 % IVPB, 10 mg, Intravenous, Daily  albuterol sulfate  (90 Base) MCG/ACT inhaler 2 puff, 2 puff, Inhalation, 4x Daily  hydroCHLOROthiazide (HYDRODIURIL) tablet 25 mg, 25 mg, Oral, Daily  LORazepam (ATIVAN) tablet 0.5 mg, 0.5 mg, Oral, Q6H PRN  albuterol sulfate  (90 Base) MCG/ACT inhaler 2 puff, 2 puff, Inhalation, Q4H PRN  ipratropium (ATROVENT HFA) 17 MCG/ACT inhaler 2 puff, 2 puff, Inhalation, Q4H PRN  budesonide-formoterol (SYMBICORT) 160-4.5 MCG/ACT inhaler 2 puff, 2 puff, Inhalation, BID  montelukast (SINGULAIR) tablet 10 mg, 10 mg, Oral, Nightly  sodium chloride flush 0.9 % injection 10 mL, 10 mL, Intravenous, 2 times per day  sodium chloride flush 0.9 % injection 10 mL, 10 mL, Intravenous, PRN  acetaminophen (TYLENOL) tablet 650 mg, 650 mg, Oral, Q6H PRN **OR** acetaminophen (TYLENOL) suppository 650 mg, 650 mg, Rectal, Q6H PRN  polyethylene glycol (GLYCOLAX) packet 17 g, 17 g, Oral, Daily PRN  promethazine (PHENERGAN) tablet 12.5 mg, 12.5 mg, Oral, Q6H PRN **OR** ondansetron (ZOFRAN) injection 4 mg, 4 mg, Intravenous, Q6H PRN  famotidine (PEPCID) tablet 20 mg, 20 mg, Oral, BID  vitamin D3 (CHOLECALCIFEROL) tablet 5,000 Units, 5,000 Units, Oral, Daily  guaiFENesin-dextromethorphan (ROBITUSSIN DM) 100-10 MG/5ML syrup 5 mL, 5 mL, Oral, Q4H PRN  ipratropium (ATROVENT HFA) 17 MCG/ACT inhaler 2 puff, 2 puff, Inhalation, 4x daily  enoxaparin (LOVENOX) injection 40 mg, 40 mg, Subcutaneous, BID  0.9 % sodium chloride bolus, 30 mL, Intravenous, PRN    Allergies   Allergen Reactions    Penicillins Rash       Social History:    TOBACCO:   reports that she has never smoked. She has never used smokeless tobacco.  ETOH:   reports current alcohol use. Patient currently lives independently  Environmental/chemical exposure: None known    Family History:   History reviewed. No pertinent family history. REVIEW OF SYSTEMS:    Negative except that mentioned in the subjective portion. Objective:   PHYSICAL EXAM:      VITALS:  /70   Pulse 71   Temp 98.1 °F (36.7 °C) (Oral)   Resp 18   Ht 5' 6\" (1.676 m)   Wt (!) 327 lb 1.6 oz (148.4 kg)   SpO2 92%   BMI 52.80 kg/m²      24HR INTAKE/OUTPUT:      Intake/Output Summary (Last 24 hours) at 12/10/2020 1351  Last data filed at 12/10/2020 0915  Gross per 24 hour   Intake 840 ml   Output 1200 ml   Net -360 ml     PHYSICAL EXAM:     CONSTITUTIONAL: She is a 53y.o.-year-old who appears her stated age. She is alert and oriented x 3 and in no acute distress. HEENT: PERRLA, EOMI. No scleral icterus. No thrush, atraumatic, normocephalic. NECK: Supple, without cervical or supraclavicular lymphadenopathy:  CARDIOVASCULAR: Deferred due to full PPE precautions  RESPIRATORY & CHEST: Deferred due to full PPE precautions  GASTROINTESTINAL & ABDOMEN: Soft, nontender,  non-distended, without hepatosplenomegaly. GENITOURINARY: Deferred. MUSCULOSKELETAL: No tenderness to palpation of the axial skeleton. There is no clubbing. No cyanosis. No edema of the lower extremities. SKIN OF BODY: No rash or jaundice. PSYCHIATRIC EVALUATION: Normal affect. Patient answers questions appropriately.    HEMATOLOGIC/LYMPHATIC/ finished a course of remdesivir. Also received 1 unit convalescent plasma  Continue Symbicort and DuoNeb  Continue Singulair  Get patient out of bed into the chair  Her inflammatory markers are trending down.     Will continue to follow         Ned Gupta MD   Pulmonary Critical Care and Sleep Medicine  [facility] [place] [address] [place]  12/4/2020, 1:51 PM

## 2020-12-10 NOTE — PROGRESS NOTES
Hospitalist Progress Note      PCP: Estelle Rice MD    Date of Admission: 12/4/2020    Chief Complaint: Dyspnea    Hospital Course: This 44-year-old female with history of severe persistent asthma presented for evaluation of dyspnea. Pt was  recently diagnosed with COVID-19 infection. On admission, pt was hypoxic and started on 5 L of nasal cannula. Later O2 requirement increase 15L and pt [facility]desivir, [facility] and went up to 15, however, has come down to 10 L at this time. Interval history:   Overnight events noted  O2 requirement down to 6 L HFNC satting around 93%  S/p convalescent plasma and 5-day course of remdesivir  Steroid dose decreased to 10 mg daily today  Afebrile, WBCs slightly elevated likely secondary to steroids      Due to the current efforts to prevent transmission of COVID-19 and also the need to preserve PPE for other caregivers, a face-to-face encounter with the patient was not performed. That being said, all relevant records and diagnostic tests were reviewed, including laboratory results and imaging. Please reference any relevant documentation elsewhere.       Medications:  Reviewed    Infusion Medications   Scheduled Medications    dexamethasone (DECADRON) IVPB  10 mg Intravenous Daily    albuterol sulfate HFA  2 puff Inhalation 4x Daily    hydroCHLOROthiazide  25 mg Oral Daily    budesonide-formoterol  2 puff Inhalation BID    montelukast  10 mg Oral Nightly    sodium chloride flush  10 mL Intravenous 2 times per day    famotidine  20 mg Oral BID    vitamin D3  5,000 Units Oral Daily    ipratropium  2 puff Inhalation 4x daily    enoxaparin  40 mg Subcutaneous BID     PRN Meds: LORazepam, albuterol sulfate HFA, ipratropium, sodium chloride flush, acetaminophen **OR** acetaminophen, polyethylene glycol, promethazine **OR** ondansetron, guaiFENesin-dextromethorphan, sodium chloride      Intake/Output Summary (Last 24 hours) at 12/10/2020 0903  Last data filed at 12/9/2020 2037  Gross per 24 hour   Intake 480 ml   Output 1200 ml   Net -720 ml         BP (!) 141/84   Pulse (!) 46   Temp 97.8 °F (36.6 °C) (Oral)   Resp 18   Ht 5' 6\" (1.676 m)   Wt (!) 327 lb 1.6 oz (148.4 kg)   SpO2 93%   BMI 52.80 kg/m²     Physical Exam  Vitals signs and nursing note reviewed. Constitutional:       General: She is not in acute distress. Cardiovascular:      Rate and Rhythm: Normal rate and regular rhythm. Heart sounds: Normal heart sounds. No murmur. No friction rub. No gallop. Pulmonary:      Effort: Pulmonary effort is normal. No respiratory distress. Breath sounds: Normal breath sounds. No stridor  Chest:      Chest wall: No tenderness. Abdominal:      General: Bowel sounds are normal. There is no distension. Neurological:      General: No focal deficit present. Mental Status: She is alert and oriented to person, place, and time. Labs:   Recent Labs     12/08/20 0514 12/09/20  0619 12/10/20  0535   WBC 9.6 10.2 11.1*   HGB 13.7 14.1 14.2   HCT 42.8 42.6 43.5    303 287     Recent Labs     12/08/20  0514 12/09/20 0619 12/10/20  0535    137 137   K 4.1 4.3 4.1    102 100   CO2 23 26 26   BUN 28* 27* 28*   CREATININE 0.5* 0.7 0.7   CALCIUM 8.7 8.8 8.7     Recent Labs     12/08/20  0514 12/09/20 0619 12/10/20  0535   AST 19 12* 12*   ALT 36 29 28   BILITOT <0.2 0.4 0.3   ALKPHOS 109 105 119     Recent Labs     12/08/20 0514 12/09/20 0619 12/10/20  0535   INR 0.98 0.97 0.97     No results for input(s): CKTOTAL, TROPONINI in the last 72 hours.     Urinalysis:      Lab Results   Component Value Date    NITRU Negative 12/06/2014    WBCUA 20-50 12/06/2014    BACTERIA 3+ 12/06/2014    RBCUA 0-2 12/06/2014    BLOODU SMALL 12/06/2014    SPECGRAV 1.015 12/06/2014    GLUCOSEU Negative 12/06/2014       Radiology:  XR CHEST PORTABLE   Final Result XR CHEST PORTABLE   Final Result   Extensive bilateral pulmonary disease suspicious for COVID/viral pneumonia.                  Assessment/Plan:    Active Hospital Problems    Diagnosis    Pneumonia due to COVID-19 virus [U07.1, J12.89]      Acute hypoxic respiratory failure 2/2 COVID-19 pneumonia  Pulmonology on board: repeat CXR on 12/8 showed no significant interval change  S/p convalescent plasma and remdesivir high dose dexamethasone   Continue breathing treatment and supplementation O2  to keep sats b/w 88-92 %   Continue anticoagulation    Moderate  persistent asthma: not in exacerbation  Continue with inhaled bronchodilators  IV corticosteroids as above    Hypertension: Controlled on home dose of HCTZ  Monitor BP closely     DVT Prophylaxis: Lovenox  Diet: DIET GENERAL;  Code Status: Full Code    Electronically signed by Ama Patel MD on 12/10/2020 at 9:03 AM

## 2020-12-11 LAB
A/G RATIO: 1.1 (ref 1.1–2.2)
ALBUMIN SERPL-MCNC: 3.5 G/DL (ref 3.4–5)
ALP BLD-CCNC: 112 U/L (ref 40–129)
ALT SERPL-CCNC: 25 U/L (ref 10–40)
ANION GAP SERPL CALCULATED.3IONS-SCNC: 11 MMOL/L (ref 3–16)
APTT: 25.4 SEC (ref 24.2–36.2)
AST SERPL-CCNC: 12 U/L (ref 15–37)
BASOPHILS ABSOLUTE: 0 K/UL (ref 0–0.2)
BASOPHILS RELATIVE PERCENT: 0.2 %
BILIRUB SERPL-MCNC: 0.3 MG/DL (ref 0–1)
BUN BLDV-MCNC: 29 MG/DL (ref 7–20)
CALCIUM SERPL-MCNC: 8.8 MG/DL (ref 8.3–10.6)
CHLORIDE BLD-SCNC: 100 MMOL/L (ref 99–110)
CO2: 25 MMOL/L (ref 21–32)
CREAT SERPL-MCNC: 0.6 MG/DL (ref 0.6–1.1)
D DIMER: <200 NG/ML DDU (ref 0–229)
EOSINOPHILS ABSOLUTE: 0 K/UL (ref 0–0.6)
EOSINOPHILS RELATIVE PERCENT: 0 %
FIBRINOGEN: 289 MG/DL (ref 200–397)
GFR AFRICAN AMERICAN: >60
GFR NON-AFRICAN AMERICAN: >60
GLOBULIN: 3.3 G/DL
GLUCOSE BLD-MCNC: 116 MG/DL (ref 70–99)
HCT VFR BLD CALC: 43.7 % (ref 36–48)
HEMOGLOBIN: 14.2 G/DL (ref 12–16)
INR BLD: 0.91 (ref 0.86–1.14)
LYMPHOCYTES ABSOLUTE: 0.9 K/UL (ref 1–5.1)
LYMPHOCYTES RELATIVE PERCENT: 5.2 %
MCH RBC QN AUTO: 27.7 PG (ref 26–34)
MCHC RBC AUTO-ENTMCNC: 32.5 G/DL (ref 31–36)
MCV RBC AUTO: 85.3 FL (ref 80–100)
MONOCYTES ABSOLUTE: 1.2 K/UL (ref 0–1.3)
MONOCYTES RELATIVE PERCENT: 7.2 %
NEUTROPHILS ABSOLUTE: 15 K/UL (ref 1.7–7.7)
NEUTROPHILS RELATIVE PERCENT: 87.4 %
PDW BLD-RTO: 14.7 % (ref 12.4–15.4)
PLATELET # BLD: 261 K/UL (ref 135–450)
PMV BLD AUTO: 8.3 FL (ref 5–10.5)
POTASSIUM REFLEX MAGNESIUM: 4.3 MMOL/L (ref 3.5–5.1)
PROCALCITONIN: 0.04 NG/ML (ref 0–0.15)
PROTHROMBIN TIME: 10.6 SEC (ref 10–13.2)
RBC # BLD: 5.12 M/UL (ref 4–5.2)
SODIUM BLD-SCNC: 136 MMOL/L (ref 136–145)
TOTAL PROTEIN: 6.8 G/DL (ref 6.4–8.2)
WBC # BLD: 17.2 K/UL (ref 4–11)

## 2020-12-11 PROCEDURE — 36415 COLL VENOUS BLD VENIPUNCTURE: CPT

## 2020-12-11 PROCEDURE — 99233 SBSQ HOSP IP/OBS HIGH 50: CPT | Performed by: INTERNAL MEDICINE

## 2020-12-11 PROCEDURE — 84145 PROCALCITONIN (PCT): CPT

## 2020-12-11 PROCEDURE — 2580000003 HC RX 258: Performed by: INTERNAL MEDICINE

## 2020-12-11 PROCEDURE — 1200000000 HC SEMI PRIVATE

## 2020-12-11 PROCEDURE — 6370000000 HC RX 637 (ALT 250 FOR IP): Performed by: INTERNAL MEDICINE

## 2020-12-11 PROCEDURE — 85730 THROMBOPLASTIN TIME PARTIAL: CPT

## 2020-12-11 PROCEDURE — 2700000000 HC OXYGEN THERAPY PER DAY

## 2020-12-11 PROCEDURE — 80053 COMPREHEN METABOLIC PANEL: CPT

## 2020-12-11 PROCEDURE — 6360000002 HC RX W HCPCS: Performed by: INTERNAL MEDICINE

## 2020-12-11 PROCEDURE — 85610 PROTHROMBIN TIME: CPT

## 2020-12-11 PROCEDURE — 94640 AIRWAY INHALATION TREATMENT: CPT

## 2020-12-11 PROCEDURE — 85025 COMPLETE CBC W/AUTO DIFF WBC: CPT

## 2020-12-11 PROCEDURE — 85379 FIBRIN DEGRADATION QUANT: CPT

## 2020-12-11 PROCEDURE — 85384 FIBRINOGEN ACTIVITY: CPT

## 2020-12-11 RX ORDER — ALBUTEROL SULFATE 2.5 MG/3ML
SOLUTION RESPIRATORY (INHALATION)
Status: DISPENSED
Start: 2020-12-11 | End: 2020-12-12

## 2020-12-11 RX ORDER — DEXAMETHASONE 4 MG/1
6 TABLET ORAL DAILY
Status: DISCONTINUED | OUTPATIENT
Start: 2020-12-12 | End: 2020-12-13 | Stop reason: HOSPADM

## 2020-12-11 RX ADMIN — Medication 10 ML: at 21:16

## 2020-12-11 RX ADMIN — Medication 2 PUFF: at 11:55

## 2020-12-11 RX ADMIN — ACETAMINOPHEN 650 MG: 325 TABLET ORAL at 10:04

## 2020-12-11 RX ADMIN — Medication 2 PUFF: at 16:08

## 2020-12-11 RX ADMIN — Medication 2 PUFF: at 21:47

## 2020-12-11 RX ADMIN — Medication 2 PUFF: at 20:35

## 2020-12-11 RX ADMIN — CHOLECALCIFEROL TAB 125 MCG (5000 UNIT) 5000 UNITS: 125 TAB at 09:54

## 2020-12-11 RX ADMIN — ENOXAPARIN SODIUM 40 MG: 40 INJECTION SUBCUTANEOUS at 09:54

## 2020-12-11 RX ADMIN — ENOXAPARIN SODIUM 40 MG: 40 INJECTION SUBCUTANEOUS at 21:15

## 2020-12-11 RX ADMIN — Medication 2 PUFF: at 09:18

## 2020-12-11 RX ADMIN — ACETAMINOPHEN 650 MG: 325 TABLET ORAL at 17:04

## 2020-12-11 RX ADMIN — GUAIFENESIN AND DEXTROMETHORPHAN 5 ML: 100; 10 SYRUP ORAL at 21:23

## 2020-12-11 RX ADMIN — Medication 10 ML: at 09:55

## 2020-12-11 RX ADMIN — GUAIFENESIN AND DEXTROMETHORPHAN 5 ML: 100; 10 SYRUP ORAL at 17:04

## 2020-12-11 RX ADMIN — DEXAMETHASONE SODIUM PHOSPHATE 10 MG: 4 INJECTION, SOLUTION INTRA-ARTICULAR; INTRALESIONAL; INTRAMUSCULAR; INTRAVENOUS; SOFT TISSUE at 09:55

## 2020-12-11 RX ADMIN — FAMOTIDINE 20 MG: 20 TABLET ORAL at 09:54

## 2020-12-11 RX ADMIN — GUAIFENESIN AND DEXTROMETHORPHAN 5 ML: 100; 10 SYRUP ORAL at 10:04

## 2020-12-11 RX ADMIN — MONTELUKAST SODIUM 10 MG: 10 TABLET, COATED ORAL at 21:15

## 2020-12-11 RX ADMIN — HYDROCHLOROTHIAZIDE 25 MG: 25 TABLET ORAL at 09:54

## 2020-12-11 RX ADMIN — FAMOTIDINE 20 MG: 20 TABLET ORAL at 21:15

## 2020-12-11 RX ADMIN — Medication 2 PUFF: at 09:17

## 2020-12-11 ASSESSMENT — PAIN SCALES - GENERAL
PAINLEVEL_OUTOF10: 0
PAINLEVEL_OUTOF10: 4
PAINLEVEL_OUTOF10: 0
PAINLEVEL_OUTOF10: 4

## 2020-12-11 ASSESSMENT — PAIN DESCRIPTION - LOCATION: LOCATION: GENERALIZED

## 2020-12-11 ASSESSMENT — PAIN DESCRIPTION - PAIN TYPE: TYPE: ACUTE PAIN

## 2020-12-11 NOTE — PROGRESS NOTES
MDI treatment(s) given x 3 MDI's      Breath Sounds:   LUNG FIELD Pre-Treatment Post-Treatment   RUL Breath Sounds Pre-Tx RUL: Diminished Breath Sounds Post-Tx RUL: Diminished   RML Breath Sounds Pre-Tx RML: Diminished Breath Sounds Post-Tx RML: Diminished   RLL Breath Sounds Pre-Tx RLL: Diminished Breath Sounds Post-Tx RLL: Diminished   JARED Breath Sounds Pre-Tx JARED: Diminished Breath Sounds Post-Tx JARED: Diminished   LLL Breath Sounds Pre-Tx LLL: Diminished Breath Sounds Post-Tx LLL: Diminished     Peak Flow Trends    Patient's predicted PF is:  489 L/min    PF before 1st TX PF after 1st TX PF after 2nd TX PF after 3rd TX   150 [] >75% 220 [] >75% - [] >75% - [] >75%    [] 50-75%  [] 50-75%  [] 50-75%  [] 50-75%    [x] <50%  [x] <50%  [] <50%  [] <50%     COMMENTS:  Pt did very well with the Peak flow x 3 tries pre and post

## 2020-12-11 NOTE — PROGRESS NOTES
Hospitalist Progress Note      PCP: Maximilian Rick MD    Date of Admission: 12/4/2020    Chief Complaint: Dyspnea    Hospital Course: This 71-year-old female with history of severe persistent asthma presented for evaluation of dyspnea. Pt was  recently diagnosed with COVID-19 infection. On admission, pt was hypoxic and started on 5 L of nasal cannula. Later O2 requirement increase 15L and pt was started on remdesivir, corticosteroids and convalescent plasma [facility]. Initially admitted on hich and went up to 15, however, has come down to 10 L at this time. Interval history:   Overnight events noted  Remains on 6 L HFNC satting around 95%; wean as tolerated to keep sats above 92  S/p convalescent plasma and 5-day course of remdesivir  Steroid dose decreased to 10 mg daily yesterday; will decrease to 6 mg once  oxygen requirement is down to 4 L  Afebrile, WBCs elevated likely 2/2 steroids  Procalcitonin wnl     Due to the current efforts to prevent transmission of COVID-19 and also the need to preserve PPE for other caregivers, a face-to-face encounter with the patient was not performed. That being said, all relevant records and diagnostic tests were reviewed, including laboratory results and imaging. Please reference any relevant documentation elsewhere.       Medications:  Reviewed    Infusion Medications   Scheduled Medications    dexamethasone (DECADRON) IVPB  10 mg Intravenous Daily    albuterol sulfate HFA  2 puff Inhalation 4x Daily    hydroCHLOROthiazide  25 mg Oral Daily    budesonide-formoterol  2 puff Inhalation BID    montelukast  10 mg Oral Nightly    sodium chloride flush  10 mL Intravenous 2 times per day    famotidine  20 mg Oral BID    vitamin D3  5,000 Units Oral Daily    ipratropium  2 puff Inhalation 4x daily    enoxaparin  40 mg Subcutaneous BID     PRN Meds: LORazepam, albuterol sulfate HFA, ipratropium, sodium chloride flush, acetaminophen **OR** acetaminophen, polyethylene glycol, promethazine **OR** ondansetron, guaiFENesin-dextromethorphan, sodium chloride      Intake/Output Summary (Last 24 hours) at 12/11/2020 0830  Last data filed at 12/10/2020 2113  Gross per 24 hour   Intake 840 ml   Output 2400 ml   Net -1560 ml         BP (!) 148/82   Pulse 52   Temp 97.9 °F (36.6 °C) (Oral)   Resp 18   Ht 5' 6\" (1.676 m)   Wt (!) 327 lb 1.6 oz (148.4 kg)   SpO2 95%   BMI 52.80 kg/m²     Physical Exam  Vitals signs and nursing note reviewed. Constitutional:       General: She is not in acute distress. Cardiovascular:      Rate and Rhythm: Normal rate and regular rhythm. Heart sounds: Normal heart sounds. No murmur. No friction rub. No gallop. Pulmonary:      Effort: Pulmonary effort is normal. No respiratory distress. Breath sounds: Normal breath sounds. No stridor  Chest:      Chest wall: No tenderness. Abdominal:      General: Bowel sounds are normal. There is no distension. Neurological:      General: No focal deficit present. Mental Status: She is alert and oriented to person, place, and time. Labs:   Recent Labs     12/09/20  0619 12/10/20  0535 12/11/20  0633   WBC 10.2 11.1* 17.2*   HGB 14.1 14.2 14.2   HCT 42.6 43.5 43.7    287 261     Recent Labs     12/09/20  0619 12/10/20  0535 12/11/20  0633    137 136   K 4.3 4.1 4.3    100 100   CO2 26 26 25   BUN 27* 28* 29*   CREATININE 0.7 0.7 0.6   CALCIUM 8.8 8.7 8.8     Recent Labs     12/09/20  0619 12/10/20  0535 12/11/20  0633   AST 12* 12* 12*   ALT 29 28 25   BILITOT 0.4 0.3 0.3   ALKPHOS 105 119 112     Recent Labs     12/09/20  0619 12/10/20  0535 12/11/20  0632   INR 0.97 0.97 0.91     No results for input(s): CKTOTAL, TROPONINI in the last 72 hours.     Urinalysis:      Lab Results   Component Value Date    NITRU Negative 12/06/2014    WBCUA 20-50 12/06/2014    BACTERIA 3+ 12/06/2014    RBCUA 0-2 12/06/2014    BLOODU SMALL 12/06/2014    SPECGRAV 1.015 12/06/2014    GLUCOSEU Negative 12/06/2014       Radiology:  XR CHEST PORTABLE   Final Result      XR CHEST PORTABLE   Final Result   Extensive bilateral pulmonary disease suspicious for COVID/viral pneumonia.                  Assessment/Plan:    Active Hospital Problems    Diagnosis    Pneumonia due to COVID-19 virus [U07.1, J12.89]      Acute hypoxic respiratory failure 2/2 COVID-19 pneumonia  Pulmonology on board: repeat CXR on 12/8 showed no significant interval change  S/p convalescent plasma and remdesivir high dose dexamethasone   Continue breathing treatment and supplementation O2  to keep sats b/w 90 -92 %   Continue anticoagulation    Moderate  persistent asthma: not in exacerbation  Continue with inhaled bronchodilators  IV corticosteroids as above    Hypertension: Controlled on home dose of HCTZ  Monitor BP closely     DVT Prophylaxis: Lovenox  Diet: DIET GENERAL;  Code Status: Full Code    Electronically signed by Jazmin Piña MD on 12/11/2020 at 8:30 AM

## 2020-12-11 NOTE — FLOWSHEET NOTE
12/11/20 0945   Vital Signs   Temp 97.9 °F (36.6 °C)   Temp Source Oral   Pulse 77   Heart Rate Source Brachial   Resp 16   /67   BP Location Left upper arm   MAP (mmHg) 85   Patient Position Sitting   Level of Consciousness Alert (0)   MEWS Score 1   Patient Currently in Pain Denies   Pain Assessment   Pain Assessment 0-10   Pain Level 0   Non-Pharmaceutical Pain Intervention(s) Declines   Response to Pain Intervention Patient Satisfied   Oxygen Therapy   SpO2 93 %   Pulse Oximeter Device Mode Intermittent   Pulse Oximeter Device Location Finger   O2 Device High flow nasal cannula   O2 Flow Rate (L/min) 6 L/min     Shift assessment compete. VSS. Pt. Is alert and oriented resting comfortably in bed. Pt. Has no complaints of SOB at this time. Pt states she becomes SOB with ambulation and increased exercise but is able to monitor her O2 stat. POC discussed with patient and patient states understanding and is in agreement with plan. Call light and bedside table within reach. Will continue to monitor.  Gardenia Colon

## 2020-12-11 NOTE — PROGRESS NOTES
[facility] Pulmonary and Critical Care  Progress note      Reason for Consult: Acute hypoxemic respiratory failure, COVID-19 pneumonia    Subjective:   CHIEF COMPLAINT / HPI:                The patient is a 53 y.o. female with significant past medical history of:      Diagnosis Date    Asthma      Interval history: Patient's respiratory status slowly improving. Now down to 6 L/min oxygen supplementation. Does desaturate with exertion. Overall feels much improved.      Past Surgical History:        Procedure Laterality Date    CARPAL TUNNEL RELEASE      2 ON EACH SIDE    HERNIA REPAIR      X2    HYSTERECTOMY       Current Medications:    Current Facility-Administered Medications: dexamethasone (DECADRON) 10 mg in sodium chloride 0.9 % IVPB, 10 mg, Intravenous, Daily  albuterol sulfate  (90 Base) MCG/ACT inhaler 2 puff, 2 puff, Inhalation, 4x Daily  hydroCHLOROthiazide (HYDRODIURIL) tablet 25 mg, 25 mg, Oral, Daily  LORazepam (ATIVAN) tablet 0.5 mg, 0.5 mg, Oral, Q6H PRN  albuterol sulfate  (90 Base) MCG/ACT inhaler 2 puff, 2 puff, Inhalation, Q4H PRN  ipratropium (ATROVENT HFA) 17 MCG/ACT inhaler 2 puff, 2 puff, Inhalation, Q4H PRN  budesonide-formoterol (SYMBICORT) 160-4.5 MCG/ACT inhaler 2 puff, 2 puff, Inhalation, BID  montelukast (SINGULAIR) tablet 10 mg, 10 mg, Oral, Nightly  sodium chloride flush 0.9 % injection 10 mL, 10 mL, Intravenous, 2 times per day  sodium chloride flush 0.9 % injection 10 mL, 10 mL, Intravenous, PRN  acetaminophen (TYLENOL) tablet 650 mg, 650 mg, Oral, Q6H PRN **OR** acetaminophen (TYLENOL) suppository 650 mg, 650 mg, Rectal, Q6H PRN  polyethylene glycol (GLYCOLAX) packet 17 g, 17 g, Oral, Daily PRN  promethazine (PHENERGAN) tablet 12.5 mg, 12.5 mg, Oral, Q6H PRN **OR** ondansetron (ZOFRAN) injection 4 mg, 4 mg, Intravenous, Q6H PRN  famotidine (PEPCID) tablet 20 mg, 20 mg, Oral, BID  vitamin D3 (CHOLECALCIFEROL) tablet 5,000 Units, 5,000 Units, Oral, Daily  guaiFENesin-dextromethorphan (ROBITUSSIN DM) 100-10 MG/5ML syrup 5 mL, 5 mL, Oral, Q4H PRN  ipratropium (ATROVENT HFA) 17 MCG/ACT inhaler 2 puff, 2 puff, Inhalation, 4x daily  enoxaparin (LOVENOX) injection 40 mg, 40 mg, Subcutaneous, BID  0.9 % sodium chloride bolus, 30 mL, Intravenous, PRN    Allergies   Allergen Reactions    Penicillins Rash       Social History:    TOBACCO:   reports that she has never smoked. She has never used smokeless tobacco.  ETOH:   reports current alcohol use. Patient currently lives independently  Environmental/chemical exposure: None known    Family History:   History reviewed. No pertinent family history. REVIEW OF SYSTEMS:    Negative except that mentioned in the subjective portion. Objective:   PHYSICAL EXAM:      VITALS:  /67   Pulse 71   Temp 97.9 °F (36.6 °C) (Oral)   Resp 16   Ht 5' 6\" (1.676 m)   Wt (!) 327 lb 1.6 oz (148.4 kg)   SpO2 93%   BMI 52.80 kg/m²      24HR INTAKE/OUTPUT:      Intake/Output Summary (Last 24 hours) at 12/11/2020 1223  Last data filed at 12/11/2020 0945  Gross per 24 hour   Intake 720 ml   Output 2400 ml   Net -1680 ml     PHYSICAL EXAM:     CONSTITUTIONAL: She is a 53y.o.-year-old who appears her stated age. She is alert and oriented x 3 and in no acute distress. HEENT: PERRLA, EOMI. No scleral icterus. No thrush, atraumatic, normocephalic. NECK: Supple, without cervical or supraclavicular lymphadenopathy:  CARDIOVASCULAR: Deferred due to full PPE precautions  RESPIRATORY & CHEST: Deferred due to full PPE precautions  GASTROINTESTINAL & ABDOMEN: Soft, nontender,  non-distended, without hepatosplenomegaly. GENITOURINARY: Deferred. MUSCULOSKELETAL: No tenderness to palpation of the axial skeleton. There is no clubbing. No cyanosis. No edema of the lower extremities. SKIN OF BODY: No rash or jaundice. PSYCHIATRIC EVALUATION: Normal affect. Patient answers questions appropriately.    HEMATOLOGIC/LYMPHATIC/ IMMUNOLOGIC:

## 2020-12-11 NOTE — PROGRESS NOTES
Shift assessment completed. Pt is a/o X4. VSS. POC discussed and all questions answered. Pt has belongings and call light in reach. Bed is locked and in lowest position. This RN refilled pt's O2 humidification to max fill line with sterile water for inhalation. Provided pt with fresh ice water. Denies further needs, will continue to monitor.

## 2020-12-12 LAB
A/G RATIO: 1.1 (ref 1.1–2.2)
ALBUMIN SERPL-MCNC: 3.6 G/DL (ref 3.4–5)
ALP BLD-CCNC: 130 U/L (ref 40–129)
ALT SERPL-CCNC: 21 U/L (ref 10–40)
ANION GAP SERPL CALCULATED.3IONS-SCNC: 13 MMOL/L (ref 3–16)
APTT: 25.8 SEC (ref 24.2–36.2)
AST SERPL-CCNC: 8 U/L (ref 15–37)
BASOPHILS ABSOLUTE: 0 K/UL (ref 0–0.2)
BASOPHILS RELATIVE PERCENT: 0.1 %
BILIRUB SERPL-MCNC: 0.3 MG/DL (ref 0–1)
BUN BLDV-MCNC: 32 MG/DL (ref 7–20)
CALCIUM SERPL-MCNC: 9.2 MG/DL (ref 8.3–10.6)
CHLORIDE BLD-SCNC: 100 MMOL/L (ref 99–110)
CO2: 24 MMOL/L (ref 21–32)
CREAT SERPL-MCNC: 0.7 MG/DL (ref 0.6–1.1)
D DIMER: <200 NG/ML DDU (ref 0–229)
EOSINOPHILS ABSOLUTE: 0 K/UL (ref 0–0.6)
EOSINOPHILS RELATIVE PERCENT: 0 %
FIBRINOGEN: 333 MG/DL (ref 200–397)
GFR AFRICAN AMERICAN: >60
GFR NON-AFRICAN AMERICAN: >60
GLOBULIN: 3.2 G/DL
GLUCOSE BLD-MCNC: 158 MG/DL (ref 70–99)
HCT VFR BLD CALC: 42.3 % (ref 36–48)
HEMOGLOBIN: 13.6 G/DL (ref 12–16)
INR BLD: 0.85 (ref 0.86–1.14)
LYMPHOCYTES ABSOLUTE: 0.8 K/UL (ref 1–5.1)
LYMPHOCYTES RELATIVE PERCENT: 5 %
MCH RBC QN AUTO: 27.8 PG (ref 26–34)
MCHC RBC AUTO-ENTMCNC: 32.1 G/DL (ref 31–36)
MCV RBC AUTO: 86.7 FL (ref 80–100)
MONOCYTES ABSOLUTE: 1.1 K/UL (ref 0–1.3)
MONOCYTES RELATIVE PERCENT: 6.8 %
NEUTROPHILS ABSOLUTE: 14 K/UL (ref 1.7–7.7)
NEUTROPHILS RELATIVE PERCENT: 88.1 %
PDW BLD-RTO: 14.5 % (ref 12.4–15.4)
PLATELET # BLD: 244 K/UL (ref 135–450)
PMV BLD AUTO: 8.7 FL (ref 5–10.5)
POTASSIUM REFLEX MAGNESIUM: 4 MMOL/L (ref 3.5–5.1)
PROTHROMBIN TIME: 9.8 SEC (ref 10–13.2)
RBC # BLD: 4.87 M/UL (ref 4–5.2)
SODIUM BLD-SCNC: 137 MMOL/L (ref 136–145)
TOTAL PROTEIN: 6.8 G/DL (ref 6.4–8.2)
WBC # BLD: 15.9 K/UL (ref 4–11)

## 2020-12-12 PROCEDURE — 85025 COMPLETE CBC W/AUTO DIFF WBC: CPT

## 2020-12-12 PROCEDURE — 6360000002 HC RX W HCPCS: Performed by: INTERNAL MEDICINE

## 2020-12-12 PROCEDURE — 85384 FIBRINOGEN ACTIVITY: CPT

## 2020-12-12 PROCEDURE — 36415 COLL VENOUS BLD VENIPUNCTURE: CPT

## 2020-12-12 PROCEDURE — 2580000003 HC RX 258: Performed by: INTERNAL MEDICINE

## 2020-12-12 PROCEDURE — 99233 SBSQ HOSP IP/OBS HIGH 50: CPT | Performed by: INTERNAL MEDICINE

## 2020-12-12 PROCEDURE — 6370000000 HC RX 637 (ALT 250 FOR IP): Performed by: INTERNAL MEDICINE

## 2020-12-12 PROCEDURE — 85379 FIBRIN DEGRADATION QUANT: CPT

## 2020-12-12 PROCEDURE — 85730 THROMBOPLASTIN TIME PARTIAL: CPT

## 2020-12-12 PROCEDURE — 85610 PROTHROMBIN TIME: CPT

## 2020-12-12 PROCEDURE — 80053 COMPREHEN METABOLIC PANEL: CPT

## 2020-12-12 PROCEDURE — 94640 AIRWAY INHALATION TREATMENT: CPT

## 2020-12-12 PROCEDURE — 94761 N-INVAS EAR/PLS OXIMETRY MLT: CPT

## 2020-12-12 PROCEDURE — 1200000000 HC SEMI PRIVATE

## 2020-12-12 PROCEDURE — 2700000000 HC OXYGEN THERAPY PER DAY

## 2020-12-12 RX ADMIN — HYDROCHLOROTHIAZIDE 25 MG: 25 TABLET ORAL at 09:16

## 2020-12-12 RX ADMIN — GUAIFENESIN AND DEXTROMETHORPHAN 5 ML: 100; 10 SYRUP ORAL at 17:02

## 2020-12-12 RX ADMIN — ENOXAPARIN SODIUM 40 MG: 40 INJECTION SUBCUTANEOUS at 09:17

## 2020-12-12 RX ADMIN — ACETAMINOPHEN 650 MG: 325 TABLET ORAL at 17:02

## 2020-12-12 RX ADMIN — Medication 10 ML: at 09:21

## 2020-12-12 RX ADMIN — Medication 10 ML: at 21:20

## 2020-12-12 RX ADMIN — CHOLECALCIFEROL TAB 125 MCG (5000 UNIT) 5000 UNITS: 125 TAB at 09:16

## 2020-12-12 RX ADMIN — MONTELUKAST SODIUM 10 MG: 10 TABLET, COATED ORAL at 21:19

## 2020-12-12 RX ADMIN — FAMOTIDINE 20 MG: 20 TABLET ORAL at 21:19

## 2020-12-12 RX ADMIN — ENOXAPARIN SODIUM 40 MG: 40 INJECTION SUBCUTANEOUS at 21:19

## 2020-12-12 RX ADMIN — Medication 2 PUFF: at 11:45

## 2020-12-12 RX ADMIN — Medication 2 PUFF: at 08:20

## 2020-12-12 RX ADMIN — Medication 2 PUFF: at 18:16

## 2020-12-12 RX ADMIN — Medication 2 PUFF: at 15:18

## 2020-12-12 RX ADMIN — GUAIFENESIN AND DEXTROMETHORPHAN 5 ML: 100; 10 SYRUP ORAL at 05:12

## 2020-12-12 RX ADMIN — ACETAMINOPHEN 650 MG: 325 TABLET ORAL at 05:12

## 2020-12-12 RX ADMIN — GUAIFENESIN AND DEXTROMETHORPHAN 5 ML: 100; 10 SYRUP ORAL at 10:59

## 2020-12-12 RX ADMIN — FAMOTIDINE 20 MG: 20 TABLET ORAL at 09:16

## 2020-12-12 RX ADMIN — ACETAMINOPHEN 650 MG: 325 TABLET ORAL at 10:59

## 2020-12-12 RX ADMIN — DEXAMETHASONE 6 MG: 4 TABLET ORAL at 09:17

## 2020-12-12 ASSESSMENT — PAIN SCALES - GENERAL
PAINLEVEL_OUTOF10: 4
PAINLEVEL_OUTOF10: 3
PAINLEVEL_OUTOF10: 0
PAINLEVEL_OUTOF10: 4
PAINLEVEL_OUTOF10: 0
PAINLEVEL_OUTOF10: 4
PAINLEVEL_OUTOF10: 0

## 2020-12-12 ASSESSMENT — PAIN DESCRIPTION - LOCATION: LOCATION: GENERALIZED

## 2020-12-12 ASSESSMENT — PAIN DESCRIPTION - FREQUENCY: FREQUENCY: CONTINUOUS

## 2020-12-12 ASSESSMENT — PAIN SCALES - WONG BAKER
WONGBAKER_NUMERICALRESPONSE: 0

## 2020-12-12 ASSESSMENT — PAIN DESCRIPTION - PAIN TYPE: TYPE: ACUTE PAIN

## 2020-12-12 ASSESSMENT — PAIN DESCRIPTION - DESCRIPTORS: DESCRIPTORS: HEADACHE

## 2020-12-12 ASSESSMENT — PULMONARY FUNCTION TESTS
PEFR_L/MIN: 250
PEFR_L/MIN: 250

## 2020-12-12 ASSESSMENT — PAIN DESCRIPTION - PROGRESSION: CLINICAL_PROGRESSION: GRADUALLY IMPROVING

## 2020-12-12 ASSESSMENT — PAIN DESCRIPTION - ONSET: ONSET: ON-GOING

## 2020-12-12 NOTE — PROGRESS NOTES
[facility] Pulmonary and Critical Care    Follow Up Note    Subjective:   CHIEF COMPLAINT / HPI:   Chief Complaint   Patient presents with    Shortness of Breath     Pt is COVID + and has asthma. Pt reports \"unable to get my chest to open up\" even after breathing treatments and inhalers. 85 RA     Interval history: Patient originally admitted 12/04/2020 with COVID-19 pneumonia. Also has a history of of asthma. She states that she is feeling better. However, continues to require 4 L/min nasal cannula oxygen supplement to maintain saturations in the low 90s. Past Medical History:    Reviewed; no changes    Social History:    Reviewed; no changes    REVIEW OF SYSTEMS:    CONSTITUTIONAL:  negative for fevers and chills  RESPIRATORY:  See HPI  CARDIOVASCULAR:  negative for chest pain, palpitations, edema  GASTROINTESTINAL:  negative for nausea, vomiting, diarrhea, constipation and abdominal pain    Objective:   PHYSICAL EXAM:        VITALS:  BP (!) 148/83   Pulse 60   Temp 97.8 °F (36.6 °C) (Oral)   Resp 16   Ht 5' 6\" (1.676 m)   Wt (!) 329 lb (149.2 kg)   SpO2 94%   BMI 53.10 kg/m²  on 4L NC    24HR INTAKE/OUTPUT:      Intake/Output Summary (Last 24 hours) at 12/12/2020 7363  Last data filed at 12/11/2020 1645  Gross per 24 hour   Intake 720 ml   Output --   Net 720 ml       CONSTITUTIONAL:  awake, alert,  no apparent distress, and appears stated age  LUNGS:  No increased work of breathing and clear to auscultation, no crackles or wheezes  CARDIOVASCULAR: S1 and S2 and no JVD  ABDOMEN:  normal bowel sounds, non-distended and non-tender to palpation  EXT: No edema, no calf tenderness. Pulses are present bilaterally. NEUROLOGIC:  Mental Status Exam:  Level of Alertness:   awake  Orientation:   person, place, time.  Non focal  SKIN:  normal skin color, texture, turgor, no redness, warmth, or swelling at IV sites    DATA:    CBC:  Recent Labs     12/10/20  0535 12/11/20  0633 12/12/20  0546   WBC 11.1* 17.2* 15.9*

## 2020-12-12 NOTE — PROGRESS NOTES
Hospitalist Progress Note      PCP: Talya Cassidy MD    Date of Admission: 12/4/2020    Chief Complaint: Dyspnea    Hospital Course: This 64-year-old female with history of severe persistent asthma presented for evaluation of dyspnea. Pt was  recently diagnosed with COVID-19 infection. On admission, pt was hypoxic and started on 5 L of nasal cannula. Later O2 requirement increase 15L and pt was started on remdesivir, corticosteroids and convalescent plasma per pilmonary recs. Initially admitted on hich and went up to 15, however, has come down to 10 L at this time. Interval history:   Overnight night events noted  O2 requirement improved down to 4 L satting around 94%; wean as tolerated to keep sats above 92  S/p convalescent plasma and 5-day course of remdesivir  Steroid dose decreased to 6 mg today  Afebrile, WBCs elevated likely 2/2 steroids  Procalcitonin wnl     Due to the current efforts to prevent transmission of COVID-19 and also the need to preserve PPE for other caregivers, a face-to-face encounter with the patient was not performed. That being said, all relevant records and diagnostic tests were reviewed, including laboratory results and imaging. Please reference any relevant documentation elsewhere.       Medications:  Reviewed    Infusion Medications   Scheduled Medications    dexamethasone  6 mg Oral Daily    albuterol sulfate HFA  2 puff Inhalation 4x Daily    hydroCHLOROthiazide  25 mg Oral Daily    budesonide-formoterol  2 puff Inhalation BID    montelukast  10 mg Oral Nightly    sodium chloride flush  10 mL Intravenous 2 times per day    famotidine  20 mg Oral BID    vitamin D3  5,000 Units Oral Daily    ipratropium  2 puff Inhalation 4x daily    enoxaparin  40 mg Subcutaneous BID     PRN Meds: LORazepam, albuterol sulfate HFA, ipratropium, sodium chloride flush, acetaminophen **OR** acetaminophen, polyethylene glycol, promethazine **OR** ondansetron, guaiFENesin-dextromethorphan, sodium chloride      Intake/Output Summary (Last 24 hours) at 12/12/2020 1026  Last data filed at 12/11/2020 1645  Gross per 24 hour   Intake 480 ml   Output --   Net 480 ml         BP (!) 162/95   Pulse 71   Temp 98.1 °F (36.7 °C) (Oral)   Resp 16   Ht 5' 6\" (1.676 m)   Wt (!) 329 lb (149.2 kg)   SpO2 94%   BMI 53.10 kg/m²     Physical Exam  Vitals signs and nursing note reviewed. Constitutional:       General: She is not in acute distress. Cardiovascular:      Rate and Rhythm: Normal rate and regular rhythm. Heart sounds: Normal heart sounds. No murmur. No friction rub. No gallop. Pulmonary:      Effort: Pulmonary effort is normal. No respiratory distress. Breath sounds: Normal breath sounds. No stridor  Chest:      Chest wall: No tenderness. Abdominal:      General: Bowel sounds are normal. There is no distension. Neurological:      General: No focal deficit present. Mental Status: She is alert and oriented to person, place, and time. Labs:   Recent Labs     12/10/20  0535 12/11/20  0633 12/12/20  0546   WBC 11.1* 17.2* 15.9*   HGB 14.2 14.2 13.6   HCT 43.5 43.7 42.3    261 244     Recent Labs     12/10/20  0535 12/11/20  0633 12/12/20  0546    136 137   K 4.1 4.3 4.0    100 100   CO2 26 25 24   BUN 28* 29* 32*   CREATININE 0.7 0.6 0.7   CALCIUM 8.7 8.8 9.2     Recent Labs     12/10/20  0535 12/11/20  0633 12/12/20  0546   AST 12* 12* 8*   ALT 28 25 21   BILITOT 0.3 0.3 0.3   ALKPHOS 119 112 130*     Recent Labs     12/10/20  0535 12/11/20  0632 12/12/20  0546   INR 0.97 0.91 0.85*     No results for input(s): Shellia Bugler in the last 72 hours.     Urinalysis:      Lab Results   Component Value Date    NITRU Negative 12/06/2014    WBCUA 20-50 12/06/2014    BACTERIA 3+ 12/06/2014    RBCUA 0-2 12/06/2014    BLOODU SMALL 12/06/2014    SPECGRAV 1.015 12/06/2014    GLUCOSEU Negative 12/06/2014       Radiology:  XR CHEST PORTABLE   Final Result      XR CHEST PORTABLE   Final Result   Extensive bilateral pulmonary disease suspicious for COVID/viral pneumonia.                  Assessment/Plan:    Active Hospital Problems    Diagnosis    Pneumonia due to COVID-19 virus [U07.1, J12.89]      Acute hypoxic respiratory failure 2/2 COVID-19 pneumonia  Pulmonology on board: repeat CXR on 12/8 showed no significant interval change  S/p convalescent plasma and remdesivir high dose dexamethasone   Continue breathing treatment and supplementation O2  to keep sats b/w 90 -92 %   Continue anticoagulation    Moderate  persistent asthma: not in exacerbation  Continue with inhaled bronchodilators  IV corticosteroids as above    Hypertension: Controlled on home dose of HCTZ  Monitor BP closely     DVT Prophylaxis: Lovenox  Diet: DIET GENERAL;  Code Status: Full Code    Electronically signed by Gamaliel Berrios MD on 12/12/2020 at 10:26 AM

## 2020-12-13 VITALS
HEART RATE: 58 BPM | WEIGHT: 293 LBS | SYSTOLIC BLOOD PRESSURE: 137 MMHG | RESPIRATION RATE: 18 BRPM | DIASTOLIC BLOOD PRESSURE: 83 MMHG | BODY MASS INDEX: 47.09 KG/M2 | TEMPERATURE: 97.9 F | OXYGEN SATURATION: 92 % | HEIGHT: 66 IN

## 2020-12-13 LAB
A/G RATIO: 1.2 (ref 1.1–2.2)
ALBUMIN SERPL-MCNC: 3.7 G/DL (ref 3.4–5)
ALP BLD-CCNC: 115 U/L (ref 40–129)
ALT SERPL-CCNC: 25 U/L (ref 10–40)
ANION GAP SERPL CALCULATED.3IONS-SCNC: 12 MMOL/L (ref 3–16)
APTT: 25.7 SEC (ref 24.2–36.2)
AST SERPL-CCNC: 12 U/L (ref 15–37)
BASOPHILS ABSOLUTE: 0 K/UL (ref 0–0.2)
BASOPHILS RELATIVE PERCENT: 0.1 %
BILIRUB SERPL-MCNC: 0.4 MG/DL (ref 0–1)
BUN BLDV-MCNC: 29 MG/DL (ref 7–20)
CALCIUM SERPL-MCNC: 9 MG/DL (ref 8.3–10.6)
CHLORIDE BLD-SCNC: 98 MMOL/L (ref 99–110)
CO2: 27 MMOL/L (ref 21–32)
CREAT SERPL-MCNC: 0.7 MG/DL (ref 0.6–1.1)
D DIMER: <200 NG/ML DDU (ref 0–229)
EOSINOPHILS ABSOLUTE: 0 K/UL (ref 0–0.6)
EOSINOPHILS RELATIVE PERCENT: 0 %
FIBRINOGEN: 323 MG/DL (ref 200–397)
GFR AFRICAN AMERICAN: >60
GFR NON-AFRICAN AMERICAN: >60
GLOBULIN: 3.1 G/DL
GLUCOSE BLD-MCNC: 97 MG/DL (ref 70–99)
HCT VFR BLD CALC: 40.4 % (ref 36–48)
HEMOGLOBIN: 13.5 G/DL (ref 12–16)
INR BLD: 0.9 (ref 0.86–1.14)
LYMPHOCYTES ABSOLUTE: 1.2 K/UL (ref 1–5.1)
LYMPHOCYTES RELATIVE PERCENT: 7.3 %
MCH RBC QN AUTO: 28.5 PG (ref 26–34)
MCHC RBC AUTO-ENTMCNC: 33.4 G/DL (ref 31–36)
MCV RBC AUTO: 85.2 FL (ref 80–100)
MONOCYTES ABSOLUTE: 0.9 K/UL (ref 0–1.3)
MONOCYTES RELATIVE PERCENT: 5.9 %
NEUTROPHILS ABSOLUTE: 13.8 K/UL (ref 1.7–7.7)
NEUTROPHILS RELATIVE PERCENT: 86.7 %
PDW BLD-RTO: 14.8 % (ref 12.4–15.4)
PLATELET # BLD: 255 K/UL (ref 135–450)
PMV BLD AUTO: 8.5 FL (ref 5–10.5)
POTASSIUM REFLEX MAGNESIUM: 4.2 MMOL/L (ref 3.5–5.1)
PROTHROMBIN TIME: 10.4 SEC (ref 10–13.2)
RBC # BLD: 4.74 M/UL (ref 4–5.2)
SODIUM BLD-SCNC: 137 MMOL/L (ref 136–145)
TOTAL PROTEIN: 6.8 G/DL (ref 6.4–8.2)
WBC # BLD: 15.9 K/UL (ref 4–11)

## 2020-12-13 PROCEDURE — 99232 SBSQ HOSP IP/OBS MODERATE 35: CPT | Performed by: INTERNAL MEDICINE

## 2020-12-13 PROCEDURE — 36415 COLL VENOUS BLD VENIPUNCTURE: CPT

## 2020-12-13 PROCEDURE — 6370000000 HC RX 637 (ALT 250 FOR IP): Performed by: INTERNAL MEDICINE

## 2020-12-13 PROCEDURE — 94680 O2 UPTK RST&XERS DIR SIMPLE: CPT

## 2020-12-13 PROCEDURE — 6360000002 HC RX W HCPCS: Performed by: INTERNAL MEDICINE

## 2020-12-13 PROCEDURE — 85025 COMPLETE CBC W/AUTO DIFF WBC: CPT

## 2020-12-13 PROCEDURE — 85384 FIBRINOGEN ACTIVITY: CPT

## 2020-12-13 PROCEDURE — 85610 PROTHROMBIN TIME: CPT

## 2020-12-13 PROCEDURE — 94640 AIRWAY INHALATION TREATMENT: CPT

## 2020-12-13 PROCEDURE — 85379 FIBRIN DEGRADATION QUANT: CPT

## 2020-12-13 PROCEDURE — 94761 N-INVAS EAR/PLS OXIMETRY MLT: CPT

## 2020-12-13 PROCEDURE — 80053 COMPREHEN METABOLIC PANEL: CPT

## 2020-12-13 PROCEDURE — 85730 THROMBOPLASTIN TIME PARTIAL: CPT

## 2020-12-13 PROCEDURE — 2580000003 HC RX 258: Performed by: INTERNAL MEDICINE

## 2020-12-13 RX ORDER — DEXAMETHASONE 6 MG/1
TABLET ORAL
Qty: 14 TABLET | Refills: 0 | Status: CANCELLED | OUTPATIENT
Start: 2020-12-13

## 2020-12-13 RX ORDER — DEXAMETHASONE 6 MG/1
TABLET ORAL
Qty: 10 TABLET | Refills: 0 | Status: SHIPPED | OUTPATIENT
Start: 2020-12-13 | End: 2021-01-06 | Stop reason: ALTCHOICE

## 2020-12-13 RX ORDER — FAMOTIDINE 20 MG/1
20 TABLET, FILM COATED ORAL 2 TIMES DAILY
Qty: 28 TABLET | Refills: 0 | Status: SHIPPED | OUTPATIENT
Start: 2020-12-13 | End: 2021-09-30

## 2020-12-13 RX ORDER — FAMOTIDINE 20 MG/1
20 TABLET, FILM COATED ORAL 2 TIMES DAILY
Qty: 28 TABLET | Refills: 0 | Status: SHIPPED | OUTPATIENT
Start: 2020-12-13 | End: 2020-12-13

## 2020-12-13 RX ORDER — DEXAMETHASONE 6 MG/1
TABLET ORAL
Qty: 10 TABLET | Refills: 0 | Status: SHIPPED | OUTPATIENT
Start: 2020-12-13 | End: 2020-12-13

## 2020-12-13 RX ADMIN — CHOLECALCIFEROL TAB 125 MCG (5000 UNIT) 5000 UNITS: 125 TAB at 09:41

## 2020-12-13 RX ADMIN — Medication 10 ML: at 09:41

## 2020-12-13 RX ADMIN — DEXAMETHASONE 6 MG: 4 TABLET ORAL at 08:36

## 2020-12-13 RX ADMIN — FAMOTIDINE 20 MG: 20 TABLET ORAL at 08:36

## 2020-12-13 RX ADMIN — Medication 2 PUFF: at 12:34

## 2020-12-13 RX ADMIN — ENOXAPARIN SODIUM 40 MG: 40 INJECTION SUBCUTANEOUS at 08:37

## 2020-12-13 RX ADMIN — Medication 2 PUFF: at 09:45

## 2020-12-13 RX ADMIN — Medication 2 PUFF: at 09:46

## 2020-12-13 RX ADMIN — ACETAMINOPHEN 650 MG: 325 TABLET ORAL at 01:22

## 2020-12-13 RX ADMIN — GUAIFENESIN AND DEXTROMETHORPHAN 5 ML: 100; 10 SYRUP ORAL at 01:22

## 2020-12-13 RX ADMIN — HYDROCHLOROTHIAZIDE 25 MG: 25 TABLET ORAL at 08:36

## 2020-12-13 ASSESSMENT — PAIN SCALES - GENERAL
PAINLEVEL_OUTOF10: 0
PAINLEVEL_OUTOF10: 3

## 2020-12-13 ASSESSMENT — PAIN SCALES - WONG BAKER
WONGBAKER_NUMERICALRESPONSE: 0

## 2020-12-13 NOTE — PROGRESS NOTES
Home Oxygen Evaluation   Patients room air at rest saturation SpO2 91%    Patients room air saturation SpO2 89% with exertion

## 2020-12-13 NOTE — PROGRESS NOTES
Data- discharge order received, pt verbalized agreement to discharge, disposition to previous residence, no needs for HHC/DME. Action- discharge instructions prepared and given to pt, pt verbalized understanding. Medication information packet given r/t NEW and/or CHANGED prescriptions emphasizing name/purpose/side effects, pt verbalized understanding. Discharge instruction summary: Diet- general, Activity- independent, Primary Care Physician as follows: Rasta Banks [phone] f/u appointment reviewed and complete, immunizations reviewed and complete, prescription medications filled . Inpatient surgical procedure precautions reviewed: Response- Pt belongings gathered, IV removed. Disposition is home (no HHC/DME needs), transported with RN, taken to lobby via w/c w/ Wheelchair, no complications.

## 2020-12-13 NOTE — PROGRESS NOTES
32.5 32.1 33.4   RDW 14.7 14.5 14.8      BMP:  Recent Labs     12/11/20  0633 12/12/20  0546 12/13/20  0544    137 137   K 4.3 4.0 4.2    100 98*   CO2 25 24 27   BUN 29* 32* 29*   CREATININE 0.6 0.7 0.7   CALCIUM 8.8 9.2 9.0   GLUCOSE 116* 158* 97      ABG:  No results for input(s): PHART, ECW9MRG, PO2ART, ZWB7FLX, N6TUZKDK, BEART in the last 72 hours. Cultures:    Abx:    Radiology Review:  Pertinent images / reports were reviewed as a part of this visit. Assessment:     1. Acute hypoxemic respiratory failure  2. COVID-19 pneumonia  3. Moderate persistent asthma    I have reviewed laboratories, medical records and images for this visit  Last chest x-ray 12/08/2020 reveals multifocal airspace disease similar to previous  Exam is unremarkable  Now tolerating room air with saturations in the low 90s at rest  Looks like she could be discharged home on room air  Continue Decadron 6 mg daily and taper over 2 weeks at discharge.   Follow-up in our office in about 10 to 14 days

## 2020-12-13 NOTE — DISCHARGE INSTR - COC
Continuity of Care Form    Patient Name: Sam Ugarte   :  1970  MRN:  [id number]    Admit date:  2020  Discharge date:  ***    Code Status Order: Full Code   Advance Directives:   Advance Care Flowsheet Documentation     Date/Time Healthcare Directive Type of Healthcare Directive Copy in [address] Agent's Name Healthcare Agent's Phone Number    [phone]  Unknown, patient unable to respond due to medical condition -- -- -- -- --          Admitting Physician:  Juan Luis Dela Cruz MD  PCP: Alvaro Travis MD    Discharging Nurse: [facility] Unit/Room#: [id number]-[id number]/3311-01  Discharging Unit Phone Number: ***    Emergency Contact:   Extended Emergency Contact Information  Primary Emergency Contact: Ji Campos of [address] [place] [address] Phone: [phone]  Relation: Parent  Secondary Emergency Contact: Connie Garrett 29. Phone: [phone]  Relation: Child    Past Surgical History:  Past Surgical History:   Procedure Laterality Date    CARPAL TUNNEL RELEASE      2 ON EACH SIDE   [address]      X2    HYSTERECTOMY         Immunization History: There is no immunization history on file for this patient. Active Problems:  Patient Active Problem List   Diagnosis Code    Asthma J45.909    Shortness of breath R06.02    Osteoarthritis, knee M17.10    Osteoarthritis of left knee M17.12    Morbid obesity with BMI of 45.0-49.9, [facility] E66.01, Z68.42    Sprain of anterior talofibular ligament of right ankle S93.491A    Pneumonia due to COVID-19 virus U07.1, J12.89       Isolation/Infection:   Isolation          Droplet Plus        Patient Infection Status     Infection Onset Added Last Indicated Last Indicated By Review Planned Expiration Resolved Resolved By    COVID-19 20 Delmy Chao RN 20      Tested positive 20 at [facility]  Added from external infection.           Nurse Assessment:  Last Vital Signs: /83   Pulse 58   Temp 97.9 °F (36.6 °C) (Oral)   Resp 18   Ht 5' 6\" (1.676 m)   Wt (!) 328 lb (148.8 kg)   SpO2 92%   BMI 52.94 kg/m²     Last documented pain score (0-10 scale): Pain Level: 0  Last Weight:   Wt Readings from Last 1 Encounters:   12/13/20 (!) 328 lb (148.8 kg)     Mental Status:  {IP PT MENTAL STATUS:20030}    IV Access:  { KYLEIGH IV ACCESS:394813422}    Nursing Mobility/ADLs:  Walking   {CHP DME QANA:987023445}  Transfer  {CHP DME NBAU:200123792}  Bathing  {CHP DME ANKQ:931998046}  Dressing  {CHP DME TEZQ:794228033}  Toileting  {CHP DME CWEY:601897772}  Feeding  {P DME EMERY:128979455}  Med Admin  {P DME QNGZ:800569545}  Med Delivery   { KYLEIGH MED Delivery:575561478}    Wound Care Documentation and Therapy:        Elimination:  Continence:   · Bowel: {YES / YC:73671}  · Bladder: {YES / GT:35845}  Urinary Catheter: {Urinary Catheter:025491243}   Colostomy/Ileostomy/Ileal Conduit: {YES / XT:82478}       Date of Last BM: ***    Intake/Output Summary (Last 24 hours) at 12/13/2020 1329  Last data filed at 12/12/2020 2017  Gross per 24 hour   Intake --   Output 400 ml   Net -400 ml     I/O last 3 completed shifts:  In: -   Out: 400 [Urine:400]    Safety Concerns:     508 XDx Safety Concerns:784445933}    Impairments/Disabilities:      508 XDx Impairments/Disabilities:108587248}    Nutrition Therapy:  Current Nutrition Therapy:   508 XDx Diet List:099842159}    Routes of Feeding: {CHP DME Other Feedings:499306956}  Liquids: {Slp liquid thickness:35641}  Daily Fluid Restriction: {CHP DME Yes amt example:239469718}  Last Modified Barium Swallow with Video (Video Swallowing Test): {Done Not Done PMUB:164586231}    Treatments at the Time of Hospital Discharge:   Respiratory Treatments: ***  Oxygen Therapy:  {Therapy; copd oxygen:40848}  Ventilator:    { CC Vent XVMW:132674256}    Rehab Therapies: {THERAPEUTIC INTERVENTION:7599488601}  Weight Bearing

## 2020-12-13 NOTE — FLOWSHEET NOTE
12/12/20 2336   Oxygen Therapy   SpO2 92 %   Pulse Oximeter Device Mode Continuous   Pulse Oximeter Device Location Finger   O2 Device None (Room air)    pt weaned off to RA. sats : 92%. No sign of sob. Will monitor.

## 2020-12-14 ENCOUNTER — TELEPHONE (OUTPATIENT)
Dept: PULMONOLOGY | Age: 50
End: 2020-12-14

## 2020-12-14 ENCOUNTER — CARE COORDINATION (OUTPATIENT)
Dept: CASE MANAGEMENT | Age: 50
End: 2020-12-14

## 2020-12-14 NOTE — TELEPHONE ENCOUNTER
HANNA for pt asking her to come in on 1-6-21 at 12:00 for a CXR and then call our office once this is completed so they can let her know it is ok to come on down for her appointment with Dr Ashley Borges at 1:00

## 2020-12-14 NOTE — TELEPHONE ENCOUNTER
----- Message from Gricel Alfaro MD sent at 12/12/2020  9:06 AM EST -----   Chest x-ray and appointment with Tyshawn Jameson in 10 to 14 days. `

## 2020-12-14 NOTE — TELEPHONE ENCOUNTER
Order put in for CXR and will call pt once she is discharged to let her know she will need to get CXR prior to her appt w/Dr Sunitha Johnson

## 2020-12-14 NOTE — CARE COORDINATION
and resources available to patient including: PCP, Urgent care clinics and When to call 911. The patient agrees to contact the PCP office for questions related to their healthcare. Medication reconciliation was performed with patient, who verbalizes understanding of administration of home medications. Advised obtaining a 90-day supply of all daily and as-needed medications. Covid Risk Education    Patient has following risk factors of: asthma. Education provided regarding infection prevention, and signs and symptoms of COVID-19 and when to seek medical attention with patient who verbalized understanding. Discussed exposure protocols and quarantine From CDC: Are you at higher risk for severe illness?   and given an opportunity for questions and concerns. The patient agrees to contact the COVID-19 hotline [phone] or PCP office for questions related to COVID-19. For more information on steps you can take to protect yourself, see CDC's How to Protect Yourself     Patient/family/caregiver given information for GetWell Loop and agrees to enroll yes  Patient's preferred e-mail: [email or web address]  Patient's preferred phone number: [phone]    Discussed follow-up appointments. If no appointment was previously scheduled, appointment scheduling offered: Yes. Is follow up appointment scheduled within 7 days of discharge? No  Patient will call PCP and schedule follow up. GetWell Loop based on severity of symptoms and risk factors. CTN provided contact information for future needs.           Non-face-to-face services provided:  Obtained and reviewed discharge summary and/or continuity of care documents    Care Transitions 24 Hour Call    Do you have any ongoing symptoms?: No  Do you have a copy of your discharge instructions?: Yes  Do you have all of your prescriptions and are they filled?: Yes  Have you been contacted by a [address] [employer] Pharmacist?: No  Have you scheduled your follow up appointment?: No  Were you discharged with any Home Care or Post Acute Services: No  Do you feel like you have everything you need to keep you well at home?: Yes  Care Transitions Interventions         Follow Up  Future Appointments   Date Time Provider Kelly Olivares   1/6/2021  1:00 PM MD [address] [facility]       Tata Sosa RN

## 2020-12-16 NOTE — DISCHARGE SUMMARY
Hospital Medicine Discharge Summary    Patient ID: Jose Ibarra      Patient's PCP: Allan Valentine MD    Admit Date: 12/4/2020     Discharge Date: 12/13/2020      Admitting Physician: Janet Shelton MD     Discharge Physician: Pako Lewis MD       Active Hospital Problems    Diagnosis    Pneumonia due to COVID-19 virus [U07.1, J12.89]       The patient was seen and examined on day of discharge and this discharge summary is in conjunction with any daily progress note from day of discharge. Hospital Course: This 41-year-old female with PMHx of severe persistent asthma presented for evaluation of dyspnea. Pt was  recently diagnosed with COVID-19 infection. On admission, pt was hypoxic and started on 5 L of nasal cannula and later her O2 requirement increased to 15 L HFNC. Pulmonology was consulted and pt was started on remdesivir, high dose IV corticosteroids and convalescent plasma. Pt was weaned as tolerated to 1L N/C and discharged home on supplemental O2 & 2 weeks steroid taper and  instructed to follow up with pulmonology in 10-14 days       Physical Exam Performed:     /83   Pulse 58   Temp 97.9 °F (36.6 °C) (Oral)   Resp 18   Ht 5' 6\" (1.676 m)   Wt (!) 328 lb (148.8 kg)   SpO2 92%   BMI 52.94 kg/m²       General appearance:  No apparent distress, appears stated age an  Respiratory:  Normal respiratory effort. Clear to auscultation, bilaterally without Rales/Wheezes/Rhonchi. Cardiovascular:  Regular rate and rhythm with normal S1/S2 without murmurs, rubs or gallops. Abdomen: Soft, non-tender, non-distended with normal bowel sounds. Musculoskeletal:  No clubbing, cyanosis or edema bilaterally. Full range of motion without deformity. Skin: Skin color, texture, turgor normal.  No rashes or lesions. Neurologic:  Neurovascularly intact without any focal sensory/motor deficits. Cranial nerves: II-XII intact, grossly non-focal.      Labs:  For convenience and continuity at follow-up the following most recent labs are provided:      CBC:    Lab Results   Component Value Date    WBC 15.9 12/13/2020    HGB 13.5 12/13/2020    HCT 40.4 12/13/2020     12/13/2020       Renal:    Lab Results   Component Value Date     12/13/2020    K 4.2 12/13/2020    CL 98 12/13/2020    CO2 27 12/13/2020    BUN 29 12/13/2020    CREATININE 0.7 12/13/2020    CALCIUM 9.0 12/13/2020         Significant Diagnostic Studies    Radiology:   XR CHEST PORTABLE   Final Result      XR CHEST PORTABLE   Final Result   Extensive bilateral pulmonary disease suspicious for COVID/viral pneumonia. Consults:     IP CONSULT TO HOSPITALIST  IP CONSULT TO PULMONOLOGY  IP CONSULT TO PHARMACY    Disposition: Home     Condition at Discharge: Stable    Discharge Instructions/Follow-up:    Follow up with your PCP within 7-10 days of discharge. Follow up with pulmonology as directed  as instructed   Take all your medications as prescribed. Discharge Medications:     Discharge Medication List as of 12/13/2020  1:42 PM           Details   dexamethasone (DECADRON) 6 MG tablet Take 6 mg for 4 days ,Then 4 mg for 4 days, Then 2 mg for 4 days,  1 mg for 2 day s, Disp-10 tablet, R-0Normal      famotidine (PEPCID) 20 MG tablet Take 1 tablet by mouth 2 times daily for 14 days, Disp-28 tablet, R-0Normal              Details   phentermine 37.5 MG capsule Take 37.5 mg by mouth every morning. Historical Med      methocarbamol (ROBAXIN) 750 MG tablet Take 750 mg by mouth 4 times dailyHistorical Med      hydroCHLOROthiazide (HYDRODIURIL) 25 MG tablet Take 25 mg by mouth dailyHistorical Med      Cetirizine HCl 10 MG CAPS Take by mouthHistorical Med      ibuprofen (ADVIL;MOTRIN) 800 MG tablet Take 800 mg by mouth every 6 hours as needed for PainHistorical Med      budesonide-formoterol (SYMBICORT) 160-4.5 MCG/ACT AERO INHALE TWO PUFFS BY MOUTH TWICE A DAYHistorical Med      oxyCODONE-acetaminophen (PERCOCET)  MG per tablet Historical Med      NONFORMULARY Diet pill      montelukast (SINGULAIR) 10 MG tablet Take 10 mg by mouth nightly. albuterol (PROVENTIL HFA;VENTOLIN HFA) 108 (90 BASE) MCG/ACT inhaler Inhale 2 puffs into the lungs every 6 hours as needed for Wheezing. Time Spent on discharge is more than 30 minutes in the examination, evaluation, counseling and review of medications and discharge plan. Signed:    Gamaliel Berrios MD   12/15/2020      Thank you Rakan Salguero MD for the opportunity to be involved in this patient's care. If you have any questions or concerns please feel free to contact me at [phone].

## 2021-01-06 ENCOUNTER — OFFICE VISIT (OUTPATIENT)
Dept: PULMONOLOGY | Age: 51
End: 2021-01-06
Payer: COMMERCIAL

## 2021-01-06 ENCOUNTER — HOSPITAL ENCOUNTER (OUTPATIENT)
Age: 51
Discharge: HOME | End: 2021-01-06
Payer: COMMERCIAL

## 2021-01-06 ENCOUNTER — HOSPITAL ENCOUNTER (OUTPATIENT)
Dept: GENERAL RADIOLOGY | Age: 51
Discharge: HOME | End: 2021-01-06
Payer: COMMERCIAL

## 2021-01-06 VITALS
BODY MASS INDEX: 47.09 KG/M2 | WEIGHT: 293 LBS | DIASTOLIC BLOOD PRESSURE: 89 MMHG | RESPIRATION RATE: 18 BRPM | HEIGHT: 66 IN | OXYGEN SATURATION: 98 % | TEMPERATURE: 97 F | HEART RATE: 90 BPM | SYSTOLIC BLOOD PRESSURE: 156 MMHG

## 2021-01-06 DIAGNOSIS — J45.20 MILD INTERMITTENT ASTHMA WITHOUT COMPLICATION: Primary | ICD-10-CM

## 2021-01-06 DIAGNOSIS — J12.82 PNEUMONIA DUE TO COVID-19 VIRUS: ICD-10-CM

## 2021-01-06 DIAGNOSIS — G47.33 OSA (OBSTRUCTIVE SLEEP APNEA): ICD-10-CM

## 2021-01-06 DIAGNOSIS — U07.1 PNEUMONIA DUE TO COVID-19 VIRUS: ICD-10-CM

## 2021-01-06 DIAGNOSIS — E66.01 MORBID OBESITY WITH BMI OF 45.0-49.9, ADULT (HCC): ICD-10-CM

## 2021-01-06 PROCEDURE — 99214 OFFICE O/P EST MOD 30 MIN: CPT | Performed by: INTERNAL MEDICINE

## 2021-01-06 PROCEDURE — 71046 X-RAY EXAM CHEST 2 VIEWS: CPT

## 2021-01-06 RX ORDER — LOSARTAN POTASSIUM 25 MG/1
25 TABLET ORAL DAILY
COMMUNITY
Start: 2020-12-29

## 2021-01-06 NOTE — PROGRESS NOTES
PULMONARY OFFICE FOLLOW UP NOTE    REASON FOR VISIT:   Chief Complaint   Patient presents with    Follow-up     Follow up following post COVID-19 dx 2/2 asthma complications       DATE OF VISIT: 1/6/2021     HISTORY OF PRESENT ILLNESS: 53y.o. year old female who was diagnosed to have COVID-19 infection on 12/2/2020 18 after required hospitalization for almost 2 weeks for increased shortness of breath. In the hospital patient received IV high-dose steroids, remdesivir and convalescent plasma. Patient did recover during hospitalization was sent back home without oxygen. Has now completed steroid course. Respiratory status is almost close to baseline. Does report of some exertional shortness of breath. Denies any cough, chest pain, chest tightness, wheezing, orthopnea paroxysmal nocturnal dyspnea. Continues to use Symbicort 160/4.5 mcg 2 puff twice daily and albuterol daily. Weight is stable. Does snore at nighttime but denies any daytime somnolence, a.m. dry mouth. REVIEW OF SYSTEMS: 14 point ROS is negative beside mentioned in [address] PAST MEDICAL HISTORY:   Past Medical History:   Diagnosis Date    Asthma        PAST SURGICAL HISTORY:   Past Surgical History:   Procedure Laterality Date    CARPAL TUNNEL RELEASE      2 ON EACH SIDE    HERNIA REPAIR      X2    HYSTERECTOMY         SOCIAL HISTORY:   Social History     Tobacco Use    Smoking status: Never Smoker    Smokeless tobacco: Never Used   Substance Use Topics    Alcohol use: Yes     Comment: rarely    Drug use: No       FAMILY HISTORY: No family history on file.     MEDICATIONS:     Current Outpatient Medications on File Prior to Visit   Medication Sig Dispense Refill    losartan (COZAAR) 25 MG tablet Take 25 mg by mouth daily      methocarbamol (ROBAXIN) 750 MG tablet Take 750 mg by mouth 4 times daily      hydroCHLOROthiazide (HYDRODIURIL) 25 MG tablet Take 25 mg by mouth daily      Cetirizine HCl 10 MG CAPS Take by mouth IMMUNOLOGIC: No palpable lymphadenopathy. NEUROLOGIC: Alert and oriented x 3. Groslly non-focal. Motor strength is 5+/5 in all muscle groups. The patient has a normal sensorium globally. Labs:    Lab Summary Latest Ref Rng & Units 12/13/2020 12/12/2020 12/11/2020   WBC 4.0 - 11.0 K/uL 15. 9(H) 15. 9(H) 17. 2(H)   Hgb 12.0 - 16.0 g/dL 13.5 13.6 14.2   Hct 36.0 - 48.0 % 40.4 42.3 43.7   Platelets 988 - 321 K/uL 255 244 261   Sodium 136 - 145 mmol/L 137 137 136   Potassium 3.5 - 5.1 mmol/L 4.2 4.0 4.3   BUN 7 - 20 mg/dL 29(H) 32(H) 29(H)   Creatinine 0.6 - 1.1 mg/dL 0.7 0.7 0.6   Glucose 70 - 99 mg/dL 97 158(H) 116(H)   Calcium 8.3 - 10.6 mg/dL 9.0 9.2 8.8   Alk Phos 40 - 129 U/L 115 130(H) 112   Albumin 3.4 - 5.0 g/dL 3.7 3.6 3.5   Bilirubin 0.0 - 1.0 mg/dL 0.4 0.3 0.3   AST 15 - 37 U/L 12(L) 8(L) 12(L)   ALT 10 - 40 U/L 25 21 25   Some recent data might be hidden       IMAGING:    CXR done on 1/6/2021 was personally reviewed by me and my interpretation is : Bilateral lung fields are clear without any focal consolidation or infiltrates. No pneumothorax, pleural effusions seen. Cardiac silhouette is within normal limits. Impression: No acute cardiopulmonary disease. Pulmonary Functions Testing Results:    IMPRESSION AND RECOMMENDATIONS:     1. Mild intermittent asthma without complication  -Patient continues to have dyspnea on exertion. Her chest imaging has completely normalized. Suspect asthma as the possible cause of current shortness of breath.  -We will change Symbicort to Dulera 200/5 mcg 2 puff daily to provide her with high-dose inhaled corticosteroids.  -She will continue to use albuterol as needed. -Advised patient to avoid any potential triggers that she can identify. 2. Pneumonia due to COVID-19 virus  -Patient did have COVID-19 pneumonia in early part of December 2020.  -Currently her symptoms have mostly resolved.   -Repeat chest x-ray done today shows complete resolution of bilateral pulmonary infiltrates.  -No active interventions needed at this time. 3. Morbid obesity with BMI of 45.0-49.9, adult ([facility]  -I strongly advised the patient to make efforts to lose weight. I discussed various modalities including moderate intensity intermittent exercises, diet control and bariatric surgery. If the patient loses even 10 to 15% of current body weight, it will be beneficial in improving the overall health. 4. LILA (obstructive sleep apnea)  -I have a strong suspicion that patient may have obstructive sleep apnea. -She wants to think about getting a sleep study and will let me know. Return in about 6 months (around 7/6/2021). Ebenezer Adorno MD  Pulmonary Critical Care and Sleep Medicine  1/6/2021, 1:39 PM    This note was completed using dragon medical speech recognition software. Grammatical errors, random word insertions, pronoun errors and incomplete sentences are occasional consequences of this technology due to software limitations. If there are questions or concerns about the content of this note of information contained within the body of this dictation they should be addressed with the provider for clarification.

## 2021-01-08 ENCOUNTER — TELEPHONE (OUTPATIENT)
Dept: PULMONOLOGY | Age: 51
End: 2021-01-08

## 2021-01-18 ENCOUNTER — TELEPHONE (OUTPATIENT)
Dept: PULMONOLOGY | Age: 51
End: 2021-01-18

## 2021-09-29 ENCOUNTER — TELEPHONE (OUTPATIENT)
Dept: PULMONOLOGY | Age: 51
End: 2021-09-29

## 2021-09-30 ENCOUNTER — OFFICE VISIT (OUTPATIENT)
Dept: PULMONOLOGY | Age: 51
End: 2021-09-30
Payer: COMMERCIAL

## 2021-09-30 VITALS
OXYGEN SATURATION: 96 % | HEART RATE: 97 BPM | DIASTOLIC BLOOD PRESSURE: 66 MMHG | HEIGHT: 66 IN | BODY MASS INDEX: 47.09 KG/M2 | WEIGHT: 293 LBS | SYSTOLIC BLOOD PRESSURE: 118 MMHG

## 2021-09-30 DIAGNOSIS — J45.20 MILD INTERMITTENT ASTHMA WITHOUT COMPLICATION: Primary | ICD-10-CM

## 2021-09-30 DIAGNOSIS — J12.82 PNEUMONIA DUE TO COVID-19 VIRUS: ICD-10-CM

## 2021-09-30 DIAGNOSIS — E66.01 MORBID OBESITY WITH BMI OF 45.0-49.9, ADULT (HCC): ICD-10-CM

## 2021-09-30 DIAGNOSIS — U07.1 PNEUMONIA DUE TO COVID-19 VIRUS: ICD-10-CM

## 2021-09-30 DIAGNOSIS — G47.33 OSA (OBSTRUCTIVE SLEEP APNEA): ICD-10-CM

## 2021-09-30 PROCEDURE — 99214 OFFICE O/P EST MOD 30 MIN: CPT | Performed by: INTERNAL MEDICINE

## 2021-09-30 NOTE — PROGRESS NOTES
PULMONARY OFFICE FOLLOW UP NOTE    REASON FOR VISIT:   Chief Complaint   Patient presents with    Asthma       DATE OF VISIT: 9/30/2021     HISTORY OF PRESENT ILLNESS: 49y.o. year old female comes in to the pulmonary office for a follow-up. Patient reports that her breathing has been stable. Occasionally has mild flareups. Continues to use Dulera inhaler regularly. Rarely requires albuterol. Has gained a lot of weight since the last clinic visit. Reports that she still sleeps consistently at nighttime. Does not know if she snores. Denies any daytime somnolence. Previously: Patient was diagnosed to have COVID-19 infection on 12/2/2020 18 after required hospitalization for almost 2 weeks for increased shortness of breath. In the hospital patient received IV high-dose steroids, remdesivir and convalescent plasma. Patient did recover during hospitalization was sent back home without oxygen. Has now completed steroid course. Respiratory status is almost close to baseline. Does report of some exertional shortness of breath. Denies any cough, chest pain, chest tightness, wheezing, orthopnea paroxysmal nocturnal dyspnea. Continues to use Symbicort 160/4.5 mcg 2 puff twice daily and albuterol daily. Weight is stable. Does snore at nighttime but denies any daytime somnolence, a.m. dry mouth. REVIEW OF SYSTEMS: 14 point ROS is negative beside mentioned in [address]        PAST MEDICAL HISTORY:   Past Medical History:   Diagnosis Date    Asthma        PAST SURGICAL HISTORY:   Past Surgical History:   Procedure Laterality Date    CARPAL TUNNEL RELEASE      2 ON EACH SIDE    HERNIA REPAIR      X2    HYSTERECTOMY         SOCIAL HISTORY:   Social History     Tobacco Use    Smoking status: Never Smoker    Smokeless tobacco: Never Used   Vaping Use    Vaping Use: Never used   Substance Use Topics    Alcohol use: Yes     Comment: rarely    Drug use: No       FAMILY HISTORY: No family history on file.    MEDICATIONS:     Current Outpatient Medications on File Prior to Visit   Medication Sig Dispense Refill    mometasone-formoterol (DULERA) 200-5 MCG/ACT inhaler Inhale 2 puffs into the lungs every 12 hours 1 Inhaler 5    losartan (COZAAR) 25 MG tablet Take 25 mg by mouth daily      methocarbamol (ROBAXIN) 750 MG tablet Take 750 mg by mouth 4 times daily      hydroCHLOROthiazide (HYDRODIURIL) 25 MG tablet Take 25 mg by mouth daily      Cetirizine HCl 10 MG CAPS Take by mouth      ibuprofen (ADVIL;MOTRIN) 800 MG tablet Take 800 mg by mouth every 6 hours as needed for Pain      oxyCODONE-acetaminophen (PERCOCET)  MG per tablet       montelukast (SINGULAIR) 10 MG tablet Take 10 mg by mouth nightly.  albuterol (PROVENTIL HFA;VENTOLIN HFA) 108 (90 BASE) MCG/ACT inhaler Inhale 2 puffs into the lungs every 6 hours as needed for Wheezing. No current facility-administered medications on file prior to visit. ALLERGIES:   Allergies as of 09/30/2021 - Fully Reviewed 09/30/2021   Allergen Reaction Noted    Penicillins Rash 09/03/2014      OBJECTIVE:   height is 5' 6\" (1.676 m) and weight is 355 lb (161 kg) (abnormal). Her blood pressure is 118/66 and her pulse is 97. Her oxygen saturation is 96%. PHYSICAL EXAM:    CONSTITUTIONAL: She is a 49y.o.-year-old who appears her stated age. She is alert and oriented x 3 and in no acute distress. HEENT: PERRL. No scleral icterus. No thrush, atraumatic, normocephalic. NECK: Supple, without cervical or supraclavicular lymphadenopathy:  CARDIOVASCULAR: S1 S2 RRR. Without murmer  RESPIRATORY & CHEST: Lungs are clear to auscultation and percussion. No wheezing, no crackles. Good air movement  GASTROINTESTINAL & ABDOMEN: Soft, nontender, positive bowel sounds in all quadrants, non-distended, without hepatosplenomegaly. GENITOURINARY: Deferred. MUSCULOSKELETAL: No tenderness to palpation of the axial skeleton. There is no clubbing.  No cyanosis. No edema of the lower extremities. SKIN OF BODY: No rash or jaundice. PSYCHIATRIC EVALUATION: Normal affect. Patient answers questions appropriately. HEMATOLOGIC/LYMPHATIC/ IMMUNOLOGIC: No palpable lymphadenopathy. NEUROLOGIC: Alert and oriented x 3. Groslly non-focal. Motor strength is 5+/5 in all muscle groups. The patient has a normal sensorium globally. Labs:    Lab Summary Latest Ref Rng & Units 12/13/2020 12/12/2020 12/11/2020   WBC 4.0 - 11.0 K/uL 15. 9(H) 15. 9(H) 17. 2(H)   Hgb 12.0 - 16.0 g/dL 13.5 13.6 14.2   Hct 36.0 - 48.0 % 40.4 42.3 43.7   Platelets 138 - 353 K/uL 255 244 261   Sodium 136 - 145 mmol/L 137 137 136   Potassium 3.5 - 5.1 mmol/L 4.2 4.0 4.3   BUN 7 - 20 mg/dL 29(H) 32(H) 29(H)   Creatinine 0.6 - 1.1 mg/dL 0.7 0.7 0.6   Glucose 70 - 99 mg/dL 97 158(H) 116(H)   Calcium 8.3 - 10.6 mg/dL 9.0 9.2 8.8   Alk Phos 40 - 129 U/L 115 130(H) 112   Albumin 3.4 - 5.0 g/dL 3.7 3.6 3.5   Bilirubin 0.0 - 1.0 mg/dL 0.4 0.3 0.3   AST 15 - 37 U/L 12(L) 8(L) 12(L)   ALT 10 - 40 U/L 25 21 25   Some recent data might be hidden       IMAGING:    CXR done on 1/6/2021 was personally reviewed by me and my interpretation is : Bilateral lung fields are clear without any focal consolidation or infiltrates. No pneumothorax, pleural effusions seen. Cardiac silhouette is within normal limits. Impression: No acute cardiopulmonary disease. Pulmonary Functions Testing Results:    IMPRESSION AND RECOMMENDATIONS:     1. Mild intermittent asthma without complication  -Patient's respiratory status has stabilized. -She will continue with Dulera 200/5 mcg 2 puff daily . Blanchie Bevels -She will continue to use albuterol as needed. -Advised patient to avoid any potential triggers that she can identify. 2. Pneumonia due to COVID-19 virus  -Patient did have COVID-19 pneumonia in early part of December 2020.  -Currently her symptoms have mostly resolved.   -No active interventions needed at this time.  -Advised patient to get her COVID-19 vaccination to protect her for the future. 3. Morbid obesity with BMI of 45.0-49.9, adult ([address]  -I strongly advised the patient to make efforts to lose weight. I discussed various modalities including moderate intensity intermittent exercises, diet control and bariatric surgery. If the patient loses even 10 to 15% of current body weight, it will be beneficial in improving the overall health. 4. LILA (obstructive sleep apnea)  -I have a strong suspicion that patient may have obstructive sleep apnea. -She does not want to get a sleep study at this time. Return in about 6 months (around 3/30/2022). Samuel Mcconnell MD  Pulmonary Critical Care and Sleep Medicine  9/30/2021, 1:44 PM    This note was completed using dragon medical speech recognition software. Grammatical errors, random word insertions, pronoun errors and incomplete sentences are occasional consequences of this technology due to software limitations. If there are questions or concerns about the content of this note of information contained within the body of this dictation they should be addressed with the provider for clarification.

## 2022-01-19 RX ORDER — PEDI MULTIVIT NO.7/FOLIC ACID 100 MCG
2 TABLET,CHEWABLE ORAL DAILY
COMMUNITY

## 2022-01-19 RX ORDER — MULTIVIT-MIN/IRON/FOLIC ACID/K 18-600-40
1 CAPSULE ORAL DAILY
COMMUNITY

## 2022-01-19 RX ORDER — PHENTERMINE HYDROCHLORIDE 37.5 MG/1
37.5 CAPSULE ORAL EVERY MORNING
COMMUNITY

## 2022-01-19 NOTE — PROGRESS NOTES
C-Difficile admission screening and protocol:       * Admitted with diarrhea? [] YES    [x]  NO     *Prior history of C-Diff. In last 3 months? [] YES    [x]  NO     *Antibiotic use in the past 6-8 weeks? [x]  NO    []  YES                 If yes, which ANTIBIOTIC AND REASON______     *Prior hospitalization or nursing home in the last month? []  YES    [x]  NO      [address] time___0800_________        Surgery time___0930_________    Take the following medications with a sip of water: C-Difficile admission screening and protocol:       * Admitted with diarrhea? [] YES    []  NO     *Prior history of C-Diff. In last 3 months? [] YES    []  NO     *Antibiotic use in the past 6-8 weeks? []  NO    []  YES                 If yes, which ANTIBIOTIC AND REASON______     *Prior hospitalization or nursing home in the last month? []  YES    []  NO       Do not eat or drink anything after 12:00 midnight prior to your surgery. This includes water chewing gum, mints and ice chips. You may brush your teeth and gargle the morning of your surgery, but do not swallow the water     Please see your family doctor/pediatrician for a history and physical and/or concerning medications. Bring any test results/reports from your physicians office. If you are under the care of a heart doctor or specialist doctor, please be aware that you may be asked to them for clearance    You may be asked to stop blood thinners such as Coumadin, Plavix, Fragmin, Lovenox, etc., or any anti-inflammatories such as:  Aspirin, Ibuprofen, Advil, Naproxen prior to your surgery.     We also ask that you stop any OTC medications such as fish oil, vitamin E, glucosamine, garlic, Multivitamins, COQ 10, etc. May take tylenol    We ask that you do not smoke 24 hours prior to surgery  We ask that you do not  drink any alcoholic beverages 24 hours prior to surgery     You must make arrangements for a responsible adult to take you home after your surgery. For your safety you will not be allowed to leave alone or drive yourself home. Your surgery will be cancelled if you do not have a ride home. Also for your safety, it is strongly suggested that someone stay with you the first 24 hours after your surgery. A parent or legal guardian must accompany a child scheduled for surgery and plan to stay at the hospital until the child is discharged. Please do not bring other children with you. For your comfort, please wear simple loose fitting clothing to the hospital.  Please do not bring valuables. Do not wear any make-up or nail polish on your fingers or toes      For your safety, please do not wear any jewelry or body piercing's on the day of surgery. All jewelry must be removed. If you have dentures, they will be removed before going to operating room. For your convenience, we will provide you with a container. If you wear contact lenses or glasses, they will be removed, please bring a case for them. If you have a living will and a durable power of  for healthcare, please bring in a copy. As part of our patient safety program to minimize surgical site infections, we ask you to do the following:    · Please notify your surgeon if you develop any illness between         now and the  day of your surgery. · This includes a cough, cold, fever, sore throat, nausea,         or vomiting, and diarrhea, etc.  ·  Please notify your surgeon if you experience dizziness, shortness         of breath or blurred vision between now and the time of your surgery. Do not shave your operative site 96 hours prior to surgery. For face and neck surgery, men may use an electric razor 48 hours   prior to surgery. You may shower the night before surgery or the morning of   your surgery with an antibacterial soap.     You will need to bring a photo ID and insurance card    [facility] has an onsite pharmacy, would you like to utilize our pharmacy     If you will be staying overnight and use a C-pap machine, please bring   your C-pap to hospital     Our goal is to provide you with excellent care, therefore, visitors will be limited to two(2) in the room at a time so that we may focus on providing this care for you. Please contact pre-admission testing if you have any further questions. [facility] phone number:  [phone] PAT fax number:  [phone]  Please note these are generalized instructions for all surgical cases, you may be provided with more specific instructions according to your surgery. SAFETY FIRST. .call before you fall    Unfortunately due the rise in covid cases, staffing crisis and hospital capacity, your procedure may need to be rescheduled--if this were to happen your Surgeons office will notify you ASAP

## 2022-01-19 NOTE — PROGRESS NOTES
Preoperative Screening for Elective Surgery/Invasive Procedures While COVID-19 present in the community     1. Have you tested positive or have been told to self-isolate for COVID-19 like symptoms within the past 28 days?no  2. Do you currently have any of the following symptoms?no  ? Fever >100.0 F or 99.9 F in immunocompromised patients? no  ? New onset cough, shortness of breath or difficulty breathing? no  ? New onset sore throat, myalgia (muscle aches and pains), headache, loss of taste/smell or diarrhea? no  3. Have you had a potential exposure to COVID-19 within the past 14 days by:no  ? Close contact with a confirmed case?  no  ? Close contact with a healthcare worker,  or essential infrastructure worker (grocery store, [employer], gas station, public utilities or transportation)?no  ? Do you reside in a congregate setting such as; skilled nursing facility, adult home, correctional facility, homeless shelter or other institutional setting? no  ? Have you had recent travel to a known COVID-19 hotspot? no     Indicate if the patient has a positive screen by answering yes to one or more of the above questions.

## 2022-01-25 ENCOUNTER — ANESTHESIA EVENT (OUTPATIENT)
Dept: ENDOSCOPY | Age: 52
End: 2022-01-25
Payer: COMMERCIAL

## 2022-01-26 ENCOUNTER — HOSPITAL ENCOUNTER (OUTPATIENT)
Age: 52
Setting detail: OUTPATIENT SURGERY
Discharge: HOME | End: 2022-01-26
Attending: INTERNAL MEDICINE | Admitting: INTERNAL MEDICINE
Payer: COMMERCIAL

## 2022-01-26 ENCOUNTER — ANESTHESIA (OUTPATIENT)
Dept: ENDOSCOPY | Age: 52
End: 2022-01-26
Payer: COMMERCIAL

## 2022-01-26 VITALS
SYSTOLIC BLOOD PRESSURE: 144 MMHG | TEMPERATURE: 97 F | BODY MASS INDEX: 47.09 KG/M2 | RESPIRATION RATE: 16 BRPM | HEART RATE: 62 BPM | WEIGHT: 293 LBS | HEIGHT: 66 IN | OXYGEN SATURATION: 100 % | DIASTOLIC BLOOD PRESSURE: 76 MMHG

## 2022-01-26 VITALS — TEMPERATURE: 98.6 F | DIASTOLIC BLOOD PRESSURE: 82 MMHG | SYSTOLIC BLOOD PRESSURE: 147 MMHG | OXYGEN SATURATION: 98 %

## 2022-01-26 PROCEDURE — 7100000011 HC PHASE II RECOVERY - ADDTL 15 MIN: Performed by: INTERNAL MEDICINE

## 2022-01-26 PROCEDURE — 7100000001 HC PACU RECOVERY - ADDTL 15 MIN: Performed by: INTERNAL MEDICINE

## 2022-01-26 PROCEDURE — 3700000001 HC ADD 15 MINUTES (ANESTHESIA): Performed by: INTERNAL MEDICINE

## 2022-01-26 PROCEDURE — 7100000000 HC PACU RECOVERY - FIRST 15 MIN: Performed by: INTERNAL MEDICINE

## 2022-01-26 PROCEDURE — 2500000003 HC RX 250 WO HCPCS: Performed by: NURSE ANESTHETIST, CERTIFIED REGISTERED

## 2022-01-26 PROCEDURE — 2580000003 HC RX 258: Performed by: ANESTHESIOLOGY

## 2022-01-26 PROCEDURE — 6360000002 HC RX W HCPCS: Performed by: NURSE ANESTHETIST, CERTIFIED REGISTERED

## 2022-01-26 PROCEDURE — 3700000000 HC ANESTHESIA ATTENDED CARE: Performed by: INTERNAL MEDICINE

## 2022-01-26 PROCEDURE — 7100000010 HC PHASE II RECOVERY - FIRST 15 MIN: Performed by: INTERNAL MEDICINE

## 2022-01-26 PROCEDURE — 2709999900 HC NON-CHARGEABLE SUPPLY: Performed by: INTERNAL MEDICINE

## 2022-01-26 PROCEDURE — 3609027000 HC COLONOSCOPY: Performed by: INTERNAL MEDICINE

## 2022-01-26 RX ORDER — SODIUM CHLORIDE 9 MG/ML
25 INJECTION, SOLUTION INTRAVENOUS PRN
Status: DISCONTINUED | OUTPATIENT
Start: 2022-01-26 | End: 2022-01-26 | Stop reason: HOSPADM

## 2022-01-26 RX ORDER — SODIUM CHLORIDE 0.9 % (FLUSH) 0.9 %
5-40 SYRINGE (ML) INJECTION PRN
Status: DISCONTINUED | OUTPATIENT
Start: 2022-01-26 | End: 2022-01-26 | Stop reason: HOSPADM

## 2022-01-26 RX ORDER — LIDOCAINE HYDROCHLORIDE 20 MG/ML
INJECTION, SOLUTION INFILTRATION; PERINEURAL PRN
Status: DISCONTINUED | OUTPATIENT
Start: 2022-01-26 | End: 2022-01-26 | Stop reason: SDUPTHER

## 2022-01-26 RX ORDER — SODIUM CHLORIDE 0.9 % (FLUSH) 0.9 %
5-40 SYRINGE (ML) INJECTION EVERY 12 HOURS SCHEDULED
Status: DISCONTINUED | OUTPATIENT
Start: 2022-01-26 | End: 2022-01-26 | Stop reason: HOSPADM

## 2022-01-26 RX ORDER — PROPOFOL 10 MG/ML
INJECTION, EMULSION INTRAVENOUS CONTINUOUS PRN
Status: DISCONTINUED | OUTPATIENT
Start: 2022-01-26 | End: 2022-01-26 | Stop reason: SDUPTHER

## 2022-01-26 RX ORDER — SODIUM CHLORIDE 9 MG/ML
INJECTION, SOLUTION INTRAVENOUS CONTINUOUS
Status: DISCONTINUED | OUTPATIENT
Start: 2022-01-26 | End: 2022-01-26 | Stop reason: HOSPADM

## 2022-01-26 RX ADMIN — PROPOFOL 300 MCG/KG/MIN: 10 INJECTION, EMULSION INTRAVENOUS at 09:30

## 2022-01-26 RX ADMIN — SODIUM CHLORIDE: 9 INJECTION, SOLUTION INTRAVENOUS at 08:23

## 2022-01-26 RX ADMIN — LIDOCAINE HYDROCHLORIDE 50 MG: 20 INJECTION, SOLUTION INFILTRATION; PERINEURAL at 09:30

## 2022-01-26 ASSESSMENT — PULMONARY FUNCTION TESTS
PIF_VALUE: 1
PIF_VALUE: 0
PIF_VALUE: 1

## 2022-01-26 ASSESSMENT — PAIN - FUNCTIONAL ASSESSMENT
PAIN_FUNCTIONAL_ASSESSMENT: 0-10
PAIN_FUNCTIONAL_ASSESSMENT: 0-10

## 2022-01-26 ASSESSMENT — PAIN SCALES - GENERAL
PAINLEVEL_OUTOF10: 0

## 2022-01-26 ASSESSMENT — ENCOUNTER SYMPTOMS: SHORTNESS OF BREATH: 1

## 2022-01-26 NOTE — PROGRESS NOTES
Admitted to PACU 13 from ENDO, pt drowsy but arousable, VSS on 1L O2 NC. Report recd from anesthesia.

## 2022-01-26 NOTE — PROCEDURES
[address]                               COLONOSCOPY REPORT    PATIENT NAME: Charlene Fuller                     :        1970  MED REC NO:   [id number]                          ROOM:  ACCOUNT NO:   [de-identified]                           ADMIT DATE: 2022  PROVIDER:     Daron Greene MD    DATE OF PROCEDURE:  2022    REFERRING PROVIDER:  Ilene Mccray MD    PATIENT HISTORY:  A 81-year-old female, outpatient. INSTRUMENT USED:  Olympus PCF-H190L. MEDICATIONS OF PROCEDURE:  The patient was premedicated with Diprivan  intravenously as administered by the anesthesiology service. INDICATIONS:  The patient presents for colon cancer screening. DESCRIPTION OF PROCEDURE:  The digital and anal exams were normal.  The  colonoscope was inserted to the cecum. The prep was alternately good to  fair. Where the prep was fair, lesions such as small polyps or vascular  ectasias could have been missed. No mucosal lesions were identified. There was incidental sigmoid diverticulosis. ESTIMATED BLOOD LOSS:  None. IMPRESSION:  Sigmoid diverticulosis only. PLAN:  The patient should undergo a screening colonoscopy in 10 years. KRYSTAL Montez MD    D: 2022 10:08:27       T: 2022 10:12:24     [id number]  Job#: [id number]     Doc#: [id number]    CC:  Daron Rogers MD

## 2022-01-26 NOTE — BRIEF OP NOTE
Brief Postoperative Note    Trung Correa  YOB: 1970  [id number]      Pre-operative Diagnosis: Screening    Previous Colonoscopy: No  When: unknown  Greater than 3 years? No      Post-operative Diagnosis: Same    Procedure: Colonoscopy    The preparation was good.     Anesthesia: MAC    Surgeons/Assistants: Balta Horton MD    Estimated Blood Loss: None    Complications: None    Specimens: Was Not Obtained    Findings: See dictated report    Electronically signed by Balta Horotn MD on 7/10/2017 at 7:45 AM

## 2022-01-26 NOTE — ANESTHESIA PRE PROCEDURE
[facility] Department of Anesthesiology  Pre-Anesthesia Evaluation/Consultation       Name:  Lucina Reynaga  : 1970  Age:  49 y.o. MRN:  [id number]  Date: 2022           Surgeon: Surgeon(s):  Sherice Mace MD    Procedure: Procedure(s):  COLONOSCOPY     Allergies   Allergen Reactions    Penicillins Rash     Patient Active Problem List   Diagnosis    Asthma    Shortness of breath    Osteoarthritis, knee    Osteoarthritis of left knee    Morbid obesity with BMI of 45.0-49.9, adult ([address]    Sprain of anterior talofibular ligament of right ankle    Pneumonia due to COVID-19 virus     Past Medical History:   Diagnosis Date    Arthritis     Asthma      Past Surgical History:   Procedure Laterality Date    CARPAL TUNNEL RELEASE      2 ON EACH SIDE    HERNIA REPAIR      X2    HYSTERECTOMY      KNEE ARTHROSCOPY Bilateral      Social History     Tobacco Use    Smoking status: Never Smoker    Smokeless tobacco: Never Used   Vaping Use    Vaping Use: Never used   Substance Use Topics    Alcohol use: Yes     Comment: rarely    Drug use: Never     Medications  No current facility-administered medications on file prior to encounter. Current Outpatient Medications on File Prior to Encounter   Medication Sig Dispense Refill    [facility] CHEW Take 2 tablets by mouth daily      Cholecalciferol (VITAMIN D) 50 MCG (2000 UT) CAPS capsule Take 1 capsule by mouth daily      phentermine (ADIPEX-P) 37.5 MG capsule Take 37.5 mg by mouth every morning.       mometasone-formoterol (DULERA) 200-5 MCG/ACT inhaler Inhale 2 puffs into the lungs every 12 hours 1 Inhaler 5    losartan (COZAAR) 25 MG tablet Take 25 mg by mouth daily      methocarbamol (ROBAXIN) 750 MG tablet Take 750 mg by mouth 3 times daily as needed       hydroCHLOROthiazide (HYDRODIURIL) 25 MG tablet Take 25 mg by mouth daily      Cetirizine HCl 10 MG CAPS Take 1 tablet by mouth at bedtime       ibuprofen (ADVIL;MOTRIN) 800 MG tablet Take 800 mg by mouth every 6 hours as needed for Pain      oxyCODONE-acetaminophen (PERCOCET)  MG per tablet Take 1 tablet by mouth every 8 hours as needed.  albuterol (PROVENTIL HFA;VENTOLIN HFA) 108 (90 BASE) MCG/ACT inhaler Inhale 2 puffs into the lungs every 6 hours as needed for Wheezing.  montelukast (SINGULAIR) 10 MG tablet Take 10 mg by mouth nightly.        Current Facility-Administered Medications   Medication Dose Route Frequency Provider Last Rate Last Admin    0.9 % sodium chloride infusion   IntraVENous Continuous Adilia Durant MD 75 mL/hr at 22 0831 NoRateChange at 22    sodium chloride flush 0.9 % injection 5-40 mL  5-40 mL IntraVENous 2 times per day Adilia Durant MD        sodium chloride flush 0.9 % injection 5-40 mL  5-40 mL IntraVENous PRN Adilia Durant MD        0.9 % sodium chloride infusion  25 mL IntraVENous PRN Adilia Durant MD         Vital Signs (Current)   Vitals:    22 1829 22 0805 22   BP:   (!) 148/76   Pulse:   76   Resp:   18   Temp:   97.9 °F (36.6 °C)   TempSrc:   Temporal   SpO2:   97%   Weight: (!) 338 lb (153.3 kg) (!) 337 lb 1.3 oz (152.9 kg)    Height: 5' 6\" (1.676 m) 5' 6\" (1.676 m)                                             Vital Signs Statistics (for past 48 hrs)     Temp  Av.9 °F (36.6 °C)  Min: 97.9 °F (36.6 °C)   Min taken time: 22  Max: 97.9 °F (36.6 °C)   Max taken time: 22  Pulse  Av  Min: 68   Min taken time: 22  Max: 68   Max taken time: 22  Resp  Av  Min: 18   Min taken time: 22  Max: 18   Max taken time: 22  BP  Min: 148/76   Min taken time: 22  Max: 148/76   Max taken time: 22  SpO2  Av %  Min: 97 %   Min taken time: 22  Max: 97 %   Max taken time: 22  BP Readings from Last 3 Encounters:   01/26/22 (!) 148/76   09/30/21 118/66   01/06/21 (!) 156/89       BMI  Body mass index is 54.41 kg/m². Estimated body mass index is 54.41 kg/m² as calculated from the following:    Height as of this encounter: 5' 6\" (1.676 m). Weight as of this encounter: 337 lb 1.3 oz (152.9 kg). CBC   Lab Results   Component Value Date    WBC 15.9 12/13/2020    RBC 4.74 12/13/2020    HGB 13.5 12/13/2020    HCT 40.4 12/13/2020    MCV 85.2 12/13/2020    RDW 14.8 12/13/2020     12/13/2020     CMP    Lab Results   Component Value Date     12/13/2020    K 4.2 12/13/2020    CL 98 12/13/2020    CO2 27 12/13/2020    BUN 29 12/13/2020    CREATININE 0.7 12/13/2020    GFRAA >60 12/13/2020    AGRATIO 1.2 12/13/2020    LABGLOM >60 12/13/2020    GLUCOSE 97 12/13/2020    PROT 6.8 12/13/2020    CALCIUM 9.0 12/13/2020    BILITOT 0.4 12/13/2020    ALKPHOS 115 12/13/2020    AST 12 12/13/2020    ALT 25 12/13/2020     BMP    Lab Results   Component Value Date     12/13/2020    K 4.2 12/13/2020    CL 98 12/13/2020    CO2 27 12/13/2020    BUN 29 12/13/2020    CREATININE 0.7 12/13/2020    CALCIUM 9.0 12/13/2020    GFRAA >60 12/13/2020    LABGLOM >60 12/13/2020    GLUCOSE 97 12/13/2020     POCGlucose  No results for input(s): GLUCOSE in the last 72 hours.    Coags    Lab Results   Component Value Date    PROTIME 10.4 12/13/2020    INR 0.90 12/13/2020    APTT 25.7 43/96/2306     HCG (If Applicable)   Lab Results   Component Value Date    PREGTESTUR Negative 12/06/2014      ABGs No results found for: PHART, PO2ART, AYO9TSO, QEJ8OUO, BEART, W0ULPHAM   Type & Screen (If Applicable)  No results found for: LABABO, LABRH                         BMI: Wt Readings from Last 3 Encounters:       NPO Status:   Date of last liquid consumption: 01/25/22   Time of last liquid consumption: 2145 (PREP)   Date of last solid food consumption: 01/24/22      Time of last solid consumption: 2000       Anesthesia Evaluation  Patient summary reviewed and Nursing notes reviewed  Airway: Mallampati: III  TM distance: >3 FB   Neck ROM: full  Mouth opening: > = 3 FB Dental:          Pulmonary:   (+) pneumonia:  shortness of breath:  asthma:     (-) COPD                           Cardiovascular:        (-) hypertension, valvular problems/murmurs, past MI, CAD, CABG/stent, dysrhythmias,  angina and no hyperlipidemia                Neuro/Psych:      (-) seizures, TIA and CVA           GI/Hepatic/Renal:   (+) morbid obesity     (-) GERD, PUD, liver disease and no renal disease       Endo/Other:    (+) : arthritis:., .    (-) diabetes mellitus               Abdominal:             Vascular: negative vascular ROS. Other Findings:           Anesthesia Plan      MAC     ASA 3     (I discussed intravenous sedation to the patient's satisfaction including risks and alternatives. The patient agreed with the plan and has no further questions. Caden Moreno MD )  Induction: intravenous. Anesthetic plan and risks discussed with patient. Plan discussed with CRNA. This pre-anesthesia assessment may be used as a history and physical.    DOS STAFF ADDENDUM:    Pt seen and examined, chart reviewed (including anesthesia, drug and allergy history). No interval changes to history and physical examination. Anesthetic plan, risks, benefits, alternatives, and personnel involved discussed with patient. Patient verbalized an understanding and agrees to proceed.       Caden Moreno MD  January 26, 2022  8:32 AM

## 2022-01-26 NOTE — PROGRESS NOTES
Vss. Tolerated sitting up and po fluids well. Family at bedside. Verbalized understanding of discharge instructions. No c/o.

## 2022-01-26 NOTE — ANESTHESIA POSTPROCEDURE EVALUATION
Department of Anesthesiology  Postprocedure Note    Patient: Radha Call  MRN: [id number]  YOB: 1970  Date of evaluation: 1/26/2022  Time:  11:55 AM     Procedure Summary     Date: 01/26/22 Room / Location: [address] [place] [address]    Anesthesia Start: 5079 Anesthesia Stop: 8667    Procedure: COLONOSCOPY (N/A ) Diagnosis: (SCREENING)    Surgeons: Antoni Mccall MD Responsible Provider: Pepito Callaway MD    Anesthesia Type: MAC ASA Status: 3          Anesthesia Type: MAC    Lisbeth Phase I: Lisbeth Score: 10    Lisbeth Phase II: Lisbeth Score: 10    Last vitals: Reviewed and per EMR flowsheets.        Anesthesia Post Evaluation    Patient location during evaluation: PACU  Level of consciousness: awake and alert  Airway patency: patent  Nausea & Vomiting: no nausea and no vomiting  Complications: no  Cardiovascular status: blood pressure returned to baseline  Respiratory status: acceptable  Hydration status: euvolemic  Comments: Postoperative Anesthesia Note    Name:    Radha Call  MRN:      [id number]    Patient Vitals in the past 12 hrs:  01/26/22 1134, BP:(!) 144/76, Pulse:62, Resp:16, SpO2:100 %  01/26/22 1110, BP:(!) 158/79, Temp:97 °F (36.1 °C), Temp src:Temporal, Pulse:68, Resp:16, SpO2:98 %  01/26/22 1015, BP:(!) 151/78, Temp:98.1 °F (36.7 °C), Temp src:Temporal, Pulse:64, Resp:18, SpO2:98 %  01/26/22 1010, BP:(!) 136/95, Pulse:69, Resp:18, SpO2:100 %  01/26/22 1005, BP:(!) 148/87, Pulse:67, Resp:18, SpO2:100 %  01/26/22 1000, BP:130/73, Pulse:62, Resp:19, SpO2:99 %  01/26/22 0955, BP:139/74, Temp:99.1 °F (37.3 °C), Temp src:Temporal, Pulse:66, Resp:11, SpO2:100 %  01/26/22 0811, BP:(!) 148/76, Temp:97.9 °F (36.6 °C), Temp src:Temporal, Pulse:76, Resp:18, SpO2:97 %  01/26/22 0805, Height:5' 6\" (1.676 m), Weight:(!) 337 lb 1.3 oz (152.9 kg)     LABS:    CBC  Lab Results       Component                Value               Date/Time                  WBC 15.9 (H)            12/13/2020 05:44 AM        HGB                      13.5                12/13/2020 05:44 AM        HCT                      40.4                12/13/2020 05:44 AM        PLT                      255                 12/13/2020 05:44 AM   RENAL  Lab Results       Component                Value               Date/Time                  NA                       137                 12/13/2020 05:44 AM        K                        4.2                 12/13/2020 05:44 AM        CL                       98 (L)              12/13/2020 05:44 AM        CO2                      27                  12/13/2020 05:44 AM        BUN                      29 (H)              12/13/2020 05:44 AM        CREATININE               0.7                 12/13/2020 05:44 AM        GLUCOSE                  97                  12/13/2020 05:44 AM   COAGS  Lab Results       Component                Value               Date/Time                  PROTIME                  10.4                12/13/2020 05:44 AM        INR                      0.90                12/13/2020 05:44 AM        APTT                     25.7                12/13/2020 05:44 AM     Intake & Output:  @26RBDV@    Nausea & Vomiting:  No    Level of Consciousness:  Awake    Pain Assessment:  Adequate analgesia    Anesthesia Complications:  No apparent anesthetic complications    SUMMARY      Vital signs stable  OK to discharge from Stage I post anesthesia care.   Care transferred from Anesthesiology department on discharge from perioperative area

## 2022-01-26 NOTE — OP NOTE
Operative Note      Patient: Everardo Castle  YOB: 1970  MRN: [id number]    Date of Procedure: 1/26/2022    Pre-Op Diagnosis: SCREENING    Post-Op Diagnosis: Same       Procedure(s):  COLONOSCOPY    Surgeon(s):  Karmen Cárdenas MD    Assistant:   * No surgical staff found *    Anesthesia: Monitor Anesthesia Care    Estimated Blood Loss (mL): None    Complications: None    Specimens:   * No specimens in log *    Implants:  * No implants in log *      Drains: * No LDAs found *    Findings: See dictated report    Detailed Description of Procedure:   See dictated report    Electronically signed by Karmen Cárdenas MD on 1/26/2022 at 9:54 AM

## 2022-01-26 NOTE — H&P
[facility]   Pre-operative History and Physical    Patient: Christiano Number  : 1970  Acct#:         HISTORY OF PRESENT ILLNESS:    The patient is a 49 y.o. female  who presents with colon cancer screening  Past Medical History:        Diagnosis Date    Arthritis     Asthma      Past Surgical History:        Procedure Laterality Date    CARPAL TUNNEL RELEASE      2 ON EACH SIDE    HERNIA REPAIR      X2    HYSTERECTOMY      KNEE ARTHROSCOPY Bilateral      Medications prior to admission:   Prior to Admission medications    Medication Sig Start Date End Date Taking? Authorizing Provider   Pediatric Multivit-Minerals-C (FLINTSTONES GUMMIES) CHEW Take 2 tablets by mouth daily   Yes Historical Provider, MD   Cholecalciferol (VITAMIN D) 50 MCG (2000 UT) CAPS capsule Take 1 capsule by mouth daily   Yes Historical Provider, MD   phentermine (ADIPEX-P) 37.5 MG capsule Take 37.5 mg by mouth every morning. Yes Historical Provider, MD   mometasone-formoterol [facility]) 200-5 MCG/ACT inhaler Inhale 2 puffs into the lungs every 12 hours 21  Yes Travis Sanches MD   losartan (COZAAR) 25 MG tablet Take 25 mg by mouth daily 20  Yes Historical Provider, MD   methocarbamol (ROBAXIN) 750 MG tablet Take 750 mg by mouth 3 times daily as needed    Yes Historical Provider, MD   hydroCHLOROthiazide (HYDRODIURIL) 25 MG tablet Take 25 mg by mouth daily   Yes Historical Provider, MD   Cetirizine HCl 10 MG CAPS Take 1 tablet by mouth at bedtime    Yes Historical Provider, MD   ibuprofen (ADVIL;MOTRIN) 800 MG tablet Take 800 mg by mouth every 6 hours as needed for Pain   Yes Historical Provider, MD   oxyCODONE-acetaminophen (PERCOCET)  MG per tablet Take 1 tablet by mouth every 8 hours as needed. 7/2/15  Yes Historical Provider, MD   albuterol (PROVENTIL HFA;VENTOLIN HFA) 108 (90 BASE) MCG/ACT inhaler Inhale 2 puffs into the lungs every 6 hours as needed for Wheezing.    Yes Historical Provider, MD   montelukast (SINGULAIR) 10 MG tablet Take 10 mg by mouth nightly. Historical Provider, MD     Allergies:    Penicillins  Social History:   Social History     Socioeconomic History    Marital status: Single     Spouse name: Not on file    Number of children: Not on file    Years of education: Not on file    Highest education level: Not on file   Occupational History    Not on file   Tobacco Use    Smoking status: Never Smoker    Smokeless tobacco: Never Used   Vaping Use    Vaping Use: Never used   Substance and Sexual Activity    Alcohol use: Yes     Comment: rarely    Drug use: Never    Sexual activity: Not on file   Other Topics Concern    Not on file   Social History Narrative    Not on file     Social Determinants of Health     Financial Resource Strain:     Difficulty of Paying Living Expenses: Not on file   Food Insecurity:     Worried About Running Out of Food in the Last Year: Not on file    Sp of Food in the Last Year: Not on file   Transportation Needs:     Lack of Transportation (Medical): Not on file    Lack of Transportation (Non-Medical):  Not on file   Physical Activity:     Days of Exercise per Week: Not on file    Minutes of Exercise per Session: Not on file   Stress:     Feeling of Stress : Not on file   Social Connections:     Frequency of Communication with Friends and Family: Not on file    Frequency of Social Gatherings with Friends and Family: Not on file    Attends Latter day Services: Not on file    Active Member of Clubs or Organizations: Not on file    Attends Club or Organization Meetings: Not on file    Marital Status: Not on file   Intimate Partner Violence:     Fear of Current or Ex-Partner: Not on file    Emotionally Abused: Not on file    Physically Abused: Not on file    Sexually Abused: Not on file   Housing Stability:     Unable to Pay for Housing in the Last Year: Not on file    Number of [place] in the Last Year: Not on file    Unstable Housing in the Last Year: Not on file           Family History:   Family History   Problem Relation Age of Onset    High Blood Pressure Mother     Heart Attack Father     Diabetes Father     High Blood Pressure Sister     Diabetes Sister     Heart Attack Sister     Obesity Sister          PHYSICAL EXAM:      BP (!) 148/76   Pulse 76   Temp 97.9 °F (36.6 °C) (Temporal)   Resp 18   Ht 5' 6\" (1.676 m)   Wt (!) 337 lb 1.3 oz (152.9 kg)   SpO2 97%   BMI 54.41 kg/m²  I        Heart: Normal    Lungs: Normal    Abdomen: Normal      ASA Grade: ASA 3 - Patient with moderate systemic disease with functional limitations    III (soft palate, base of uvula visible)  ASSESSMENT AND PLAN:    1. Patient is a 49 y.o. female here for Colonoscopy  2. Procedure options, risks and benefits reviewed with patient who expresses understanding.

## 2022-01-26 NOTE — PROGRESS NOTES
Alert and oriented. Agreeable to procedure. Consent signed by pt.   Electronically signed by Syeda Calderon RN on 1/26/2022 at 8:12 AM

## 2022-10-21 ENCOUNTER — OFFICE VISIT (OUTPATIENT)
Dept: PULMONOLOGY | Age: 52
End: 2022-10-21
Payer: COMMERCIAL

## 2022-10-21 VITALS
WEIGHT: 293 LBS | DIASTOLIC BLOOD PRESSURE: 86 MMHG | SYSTOLIC BLOOD PRESSURE: 134 MMHG | OXYGEN SATURATION: 98 % | HEART RATE: 78 BPM | BODY MASS INDEX: 53.75 KG/M2

## 2022-10-21 DIAGNOSIS — G47.33 OSA (OBSTRUCTIVE SLEEP APNEA): ICD-10-CM

## 2022-10-21 DIAGNOSIS — Z86.16 HISTORY OF COVID-19: ICD-10-CM

## 2022-10-21 DIAGNOSIS — J45.20 MILD INTERMITTENT ASTHMA WITHOUT COMPLICATION: Primary | ICD-10-CM

## 2022-10-21 DIAGNOSIS — E66.01 MORBID OBESITY WITH BMI OF 45.0-49.9, ADULT (HCC): ICD-10-CM

## 2022-10-21 PROCEDURE — 99214 OFFICE O/P EST MOD 30 MIN: CPT | Performed by: INTERNAL MEDICINE

## 2022-10-21 NOTE — PROGRESS NOTES
PULMONARY OFFICE FOLLOW UP NOTE    REASON FOR VISIT:   Chief Complaint   Patient presents with    Asthma       DATE OF VISIT: 10/21/2022     HISTORY OF PRESENT ILLNESS: 47y.o. year old female comes in to the pulmonary office for a follow-up. Reports that her breathing has been stable and she has not had any new symptoms. Occasionally does get an asthma flareup which settled down within a day or 2. She did not require antibiotics or prednisone. Continues to get her inhalers through [employer]. Using [facility] regularly and very rarely requires albuterol. Sleeping regularly without any snoring, gasping or choking. Denies any daytime somnolence. Previously: Patient reports that her breathing has been stable. Occasionally has mild flareups. Continues to use Dulera inhaler regularly. Rarely requires albuterol. Has gained a lot of weight since the last clinic visit. Reports that she still sleeps consistently at nighttime. Does not know if she snores. Denies any daytime somnolence. Patient was diagnosed to have COVID-19 infection on 12/2/2020 18 after required hospitalization for almost 2 weeks for increased shortness of breath. In the hospital patient received IV high-dose steroids, remdesivir and convalescent plasma. Patient did recover during hospitalization was sent back home without oxygen. Has now completed steroid course. Respiratory status is almost close to baseline. Does report of some exertional shortness of breath. Denies any cough, chest pain, chest tightness, wheezing, orthopnea paroxysmal nocturnal dyspnea. Continues to use Symbicort 160/4.5 mcg 2 puff twice daily and albuterol daily. Weight is stable. Does snore at nighttime but denies any daytime somnolence, a.m. dry mouth. REVIEW OF SYSTEMS: 14 point ROS is negative beside mentioned in [address]        PAST MEDICAL HISTORY:   Past Medical History:   Diagnosis Date    Arthritis     Asthma        PAST SURGICAL HISTORY: Past Surgical History:   Procedure Laterality Date    CARPAL TUNNEL RELEASE      2 ON EACH SIDE    COLONOSCOPY N/A 1/26/2022    COLONOSCOPY performed by Deborah Interiano MD at [facility] (CERVIX STATUS UNKNOWN)      KNEE ARTHROSCOPY Bilateral        SOCIAL HISTORY:   Social History     Tobacco Use    Smoking status: Never    Smokeless tobacco: Never   Vaping Use    Vaping Use: Never used   Substance Use Topics    Alcohol use: Yes     Comment: rarely    Drug use: Never       FAMILY HISTORY:   Family History   Problem Relation Age of Onset    High Blood Pressure Mother     Heart Attack Father     Diabetes Father     High Blood Pressure Sister     Diabetes Sister     Heart Attack Sister     Obesity Sister        MEDICATIONS:     Current Outpatient Medications on File Prior to Visit   Medication Sig Dispense Refill    Pediatric Multivit-Minerals-C (FLINTSTONES GUMMIES) CHEW Take 2 tablets by mouth daily      Cholecalciferol (VITAMIN D) 50 MCG (2000 UT) CAPS capsule Take 1 capsule by mouth daily      phentermine (ADIPEX-P) 37.5 MG capsule Take 37.5 mg by mouth every morning. mometasone-formoterol (DULERA) 200-5 MCG/ACT inhaler Inhale 2 puffs into the lungs every 12 hours 1 Inhaler 5    losartan (COZAAR) 25 MG tablet Take 25 mg by mouth daily      methocarbamol (ROBAXIN) 750 MG tablet Take 750 mg by mouth 3 times daily as needed       hydroCHLOROthiazide (HYDRODIURIL) 25 MG tablet Take 25 mg by mouth daily      Cetirizine HCl 10 MG CAPS Take 1 tablet by mouth at bedtime       ibuprofen (ADVIL;MOTRIN) 800 MG tablet Take 800 mg by mouth every 6 hours as needed for Pain      oxyCODONE-acetaminophen (PERCOCET)  MG per tablet Take 1 tablet by mouth every 8 hours as needed. montelukast (SINGULAIR) 10 MG tablet Take 10 mg by mouth nightly.       albuterol (PROVENTIL HFA;VENTOLIN HFA) 108 (90 BASE) MCG/ACT inhaler Inhale 2 puffs into the lungs every 6 hours as needed for Wheezing. No current facility-administered medications on file prior to visit. ALLERGIES:   Allergies as of 10/21/2022 - Fully Reviewed 10/21/2022   Allergen Reaction Noted    Penicillins Rash 09/03/2014      OBJECTIVE:   weight is 333 lb (151 kg) (abnormal). Her blood pressure is 134/86 and her pulse is 78. Her oxygen saturation is 98%. PHYSICAL EXAM:    CONSTITUTIONAL: She is a 47y.o.-year-old who appears her stated age. She is alert and oriented x 3 and in no acute distress. HEENT: PERRL. No scleral icterus. No thrush, atraumatic, normocephalic. NECK: Supple, without cervical or supraclavicular lymphadenopathy:  CARDIOVASCULAR: S1 S2 RRR. Without murmer  RESPIRATORY & CHEST: Lungs are clear to auscultation and percussion. No wheezing, no crackles. Good air movement  GASTROINTESTINAL & ABDOMEN: Soft, nontender, positive bowel sounds in all quadrants, non-distended, without hepatosplenomegaly. GENITOURINARY: Deferred. MUSCULOSKELETAL: No tenderness to palpation of the axial skeleton. There is no clubbing. No cyanosis. No edema of the lower extremities. SKIN OF BODY: No rash or jaundice. PSYCHIATRIC EVALUATION: Normal affect. Patient answers questions appropriately. HEMATOLOGIC/LYMPHATIC/ IMMUNOLOGIC: No palpable lymphadenopathy. NEUROLOGIC: Alert and oriented x 3. Groslly non-focal. Motor strength is 5+/5 in all muscle groups. The patient has a normal sensorium globally. Labs:    Lab Summary Latest Ref Rng & Units 12/13/2020 12/12/2020 12/11/2020   WBC 4.0 - 11.0 K/uL 15. 9(H) 15. 9(H) 17. 2(H)   Hgb 12.0 - 16.0 g/dL 13.5 13.6 14.2   Hct 36.0 - 48.0 % 40.4 42.3 43.7   Platelets 903 - 597 K/uL 255 244 261   Sodium 136 - 145 mmol/L 137 137 136   Potassium 3.5 - 5.1 mmol/L 4.2 4.0 4.3   BUN 7 - 20 mg/dL 29(H) 32(H) 29(H)   Creatinine 0.6 - 1.1 mg/dL 0.7 0.7 0.6   Glucose 70 - 99 mg/dL 97 158(H) 116(H)   Calcium 8.3 - 10.6 mg/dL 9.0 9.2 8.8   Alk Phos 40 - 129 U/L 115 130(H) 112   Albumin 3.4 - 5.0 g/dL 3.7 3.6 3.5   Bilirubin 0.0 - 1.0 mg/dL 0.4 0.3 0.3   AST 15 - 37 U/L 12(L) 8(L) 12(L)   ALT 10 - 40 U/L 25 21 25   Some recent data might be hidden       IMAGING:    CXR done on 1/6/2021 was personally reviewed by me and my interpretation is : Bilateral lung fields are clear without any focal consolidation or infiltrates. No pneumothorax, pleural effusions seen. Cardiac silhouette is within normal limits. Impression: No acute cardiopulmonary disease. Pulmonary Functions Testing Results:    IMPRESSION AND RECOMMENDATIONS:     1. Mild intermittent asthma without complication  -Patient's respiratory status remained stable.  -As of now she will continue with Dulera 200/5 mcg 2 puff daily . .  -Also encouraged her to use albuterol as needed. -Advised patient to avoid any potential triggers that she can identify. 2. Pneumonia due to COVID-19 virus  -Patient did have COVID-19 pneumonia in early part of December 2020.  -Currently her symptoms have mostly resolved. -  3. Morbid obesity with BMI of 45.0-49.9, adult ([facility] [address]  -I strongly advised the patient to make efforts to lose weight. I discussed various modalities including moderate intensity intermittent exercises, diet control and bariatric surgery. If the patient loses even 10 to 15% of current body weight, it will be beneficial in improving the overall health. 4. LILA (obstructive sleep apnea)  -Currently has minimal sleep symptoms. -Advised patient to closely monitor her sleep symptoms and if she is seen to have snoring or daytime sleepiness I will discuss the possibility of getting a sleep study. Return in about 6 months (around 4/21/2023) for Asthma. Rob Eduardo MD  Pulmonary Critical Care and Sleep Medicine  10/21/2022, 3:53 PM    This note was completed using dragon medical speech recognition software.  Grammatical errors, random word insertions, pronoun errors and incomplete sentences are occasional consequences of this technology due to software limitations. If there are questions or concerns about the content of this note of information contained within the body of this dictation they should be addressed with the provider for clarification.

## 2023-03-04 NOTE — LETTER
[facility]  [address] [address]  Phone: [phone]  Fax: [phone]    Birdie Lyons        September 4, 2018     Patient: Bentley Davidson   YOB: 1970   Date of Visit: 8/31/2018       To Whom it May Concern:    Robby Rg was seen in my clinic on 8/31/2018. She may return to work with the restriction of sit down work only until follow up appointment in our office on 09/28/2018 for further evaluation. If you have any questions or concerns, please don't hesitate to call.     Sincerely,          Vincent Sue PA-C
0 (no pain/absence of nonverbal indicators of pain)

## 2023-05-19 ENCOUNTER — HOSPITAL ENCOUNTER (OUTPATIENT)
Dept: GENERAL RADIOLOGY | Age: 53
Discharge: HOME | End: 2023-05-19
Payer: COMMERCIAL

## 2023-05-19 ENCOUNTER — OFFICE VISIT (OUTPATIENT)
Dept: PULMONOLOGY | Age: 53
End: 2023-05-19
Payer: COMMERCIAL

## 2023-05-19 ENCOUNTER — HOSPITAL ENCOUNTER (OUTPATIENT)
Age: 53
Discharge: HOME | End: 2023-05-19
Payer: COMMERCIAL

## 2023-05-19 VITALS
TEMPERATURE: 96.6 F | SYSTOLIC BLOOD PRESSURE: 122 MMHG | OXYGEN SATURATION: 91 % | DIASTOLIC BLOOD PRESSURE: 80 MMHG | HEIGHT: 66 IN | BODY MASS INDEX: 47.09 KG/M2 | WEIGHT: 293 LBS

## 2023-05-19 DIAGNOSIS — J45.21 MILD INTERMITTENT ASTHMA WITH EXACERBATION: ICD-10-CM

## 2023-05-19 DIAGNOSIS — E66.01 CLASS 3 SEVERE OBESITY DUE TO EXCESS CALORIES WITH SERIOUS COMORBIDITY AND BODY MASS INDEX (BMI) OF 50.0 TO 59.9 IN ADULT (HCC): ICD-10-CM

## 2023-05-19 DIAGNOSIS — J45.20 MILD INTERMITTENT ASTHMA WITHOUT COMPLICATION: Primary | ICD-10-CM

## 2023-05-19 DIAGNOSIS — Z86.16 HISTORY OF COVID-19: ICD-10-CM

## 2023-05-19 PROCEDURE — 99214 OFFICE O/P EST MOD 30 MIN: CPT | Performed by: INTERNAL MEDICINE

## 2023-05-19 PROCEDURE — 71046 X-RAY EXAM CHEST 2 VIEWS: CPT

## 2023-05-19 RX ORDER — AZITHROMYCIN 250 MG/1
TABLET, FILM COATED ORAL
COMMUNITY
Start: 2023-05-16

## 2023-05-19 RX ORDER — PREDNISONE 20 MG/1
40 TABLET ORAL DAILY
Qty: 20 TABLET | Refills: 0 | Status: SHIPPED | OUTPATIENT
Start: 2023-05-19 | End: 2023-05-24

## 2023-05-19 RX ORDER — IPRATROPIUM BROMIDE AND ALBUTEROL SULFATE 2.5; .5 MG/3ML; MG/3ML
SOLUTION RESPIRATORY (INHALATION)
COMMUNITY
Start: 2023-03-12

## 2023-05-19 RX ORDER — GUAIFENESIN/DEXTROMETHORPHAN 100-10MG/5
5 SYRUP ORAL 3 TIMES DAILY PRN
Qty: 150 ML | Refills: 0 | Status: SHIPPED | OUTPATIENT
Start: 2023-05-19 | End: 2023-05-29

## 2023-05-19 NOTE — PROGRESS NOTES
PULMONARY OFFICE FOLLOW UP NOTE    REASON FOR VISIT:   Chief Complaint   Patient presents with    Wheezing     Chest tightness     Shortness of Breath     Worse with exertion    Cough     Productive of green/ yellow sputum       DATE OF VISIT: 5/19/2023     HISTORY OF PRESENT ILLNESS: 47y.o. year old female comes in to the pulmonary office for a follow-up. Patient reports that for last 1 week she has been having increased shortness of breath associate with cough and chest tightness. Did have sick contact through her grandchild. Went to her primary care physician who gave her a course of prednisone with a quick taper and a Z-Levar. She has been taking these medications regularly but despite that continues to cough. Cough is occasionally productive of yellowish to greenish expectoration. Denies any fevers. Has been using DuoNeb nebulization around-the-clock. Also taking her Dulera regularly. Denies any nasal symptoms. Previously: Reports that her breathing has been stable and she has not had any new symptoms. Occasionally does get an asthma flareup which settled down within a day or 2. She did not require antibiotics or prednisone. Continues to get her inhalers through [employer]. Using [facility] regularly and very rarely requires albuterol. Sleeping regularly without any snoring, gasping or choking. Denies any daytime somnolence. Patient reports that her breathing has been stable. Occasionally has mild flareups. Continues to use Dulera inhaler regularly. Rarely requires albuterol. Has gained a lot of weight since the last clinic visit. Reports that she still sleeps consistently at nighttime. Does not know if she snores. Denies any daytime somnolence. Patient was diagnosed to have COVID-19 infection on 12/2/2020 18 after required hospitalization for almost 2 weeks for increased shortness of breath.   In the hospital patient received IV high-dose steroids, remdesivir and convalescent

## 2023-06-23 RX ORDER — PREDNISONE 20 MG/1
40 TABLET ORAL DAILY
Qty: 10 TABLET | OUTPATIENT
Start: 2023-06-23 | End: 2023-06-28

## 2024-02-29 ENCOUNTER — OFFICE VISIT (OUTPATIENT)
Dept: PULMONOLOGY | Age: 54
End: 2024-02-29
Payer: COMMERCIAL

## 2024-02-29 VITALS
OXYGEN SATURATION: 98 % | WEIGHT: 293 LBS | DIASTOLIC BLOOD PRESSURE: 72 MMHG | HEART RATE: 77 BPM | BODY MASS INDEX: 47.09 KG/M2 | SYSTOLIC BLOOD PRESSURE: 130 MMHG | HEIGHT: 66 IN

## 2024-02-29 DIAGNOSIS — Z86.16 HISTORY OF COVID-19: ICD-10-CM

## 2024-02-29 DIAGNOSIS — J45.20 MILD INTERMITTENT ASTHMA WITHOUT COMPLICATION: Primary | ICD-10-CM

## 2024-02-29 DIAGNOSIS — G47.33 OSA (OBSTRUCTIVE SLEEP APNEA): ICD-10-CM

## 2024-02-29 DIAGNOSIS — E66.01 CLASS 3 SEVERE OBESITY DUE TO EXCESS CALORIES WITH SERIOUS COMORBIDITY AND BODY MASS INDEX (BMI) OF 50.0 TO 59.9 IN ADULT (HCC): ICD-10-CM

## 2024-02-29 PROCEDURE — 99214 OFFICE O/P EST MOD 30 MIN: CPT | Performed by: INTERNAL MEDICINE

## 2024-02-29 PROCEDURE — 94664 DEMO&/EVAL PT USE INHALER: CPT | Performed by: INTERNAL MEDICINE

## 2024-02-29 RX ORDER — TIOTROPIUM BROMIDE INHALATION SPRAY 1.56 UG/1
2 SPRAY, METERED RESPIRATORY (INHALATION) DAILY
Qty: 4 G | Refills: 3 | Status: SHIPPED | OUTPATIENT
Start: 2024-02-29

## 2024-02-29 RX ORDER — ALBUTEROL SULFATE AND BUDESONIDE 90; 80 UG/1; UG/1
2 AEROSOL, METERED RESPIRATORY (INHALATION)
COMMUNITY
Start: 2024-02-02 | End: 2024-02-29

## 2024-02-29 NOTE — PROGRESS NOTES
sleep study.     Return in about 6 months (around 8/29/2024) for Asthma.         Jeffrey Alexandra MD  Pulmonary Critical Care and Sleep Medicine  2/29/2024, 2:19 PM    This note was completed using dragon medical speech recognition software. Grammatical errors, random word insertions, pronoun errors and incomplete sentences are occasional consequences of this technology due to software limitations. If there are questions or concerns about the content of this note of information contained within the body of this dictation they should be addressed with the provider for clarification.

## 2024-05-16 ENCOUNTER — OFFICE VISIT (OUTPATIENT)
Dept: ORTHOPEDIC SURGERY | Age: 54
End: 2024-05-16
Payer: COMMERCIAL

## 2024-05-16 VITALS — BODY MASS INDEX: 47.09 KG/M2 | WEIGHT: 293 LBS | HEIGHT: 66 IN

## 2024-05-16 DIAGNOSIS — M17.12 PRIMARY OSTEOARTHRITIS OF LEFT KNEE: Primary | ICD-10-CM

## 2024-05-16 PROCEDURE — 99203 OFFICE O/P NEW LOW 30 MIN: CPT | Performed by: ORTHOPAEDIC SURGERY

## 2024-05-16 NOTE — PROGRESS NOTES
[facility]  Office Visit    Chief Complaint: Left knee pain    HPI:  Olivia Garcia is a 54 y.o. who is here for initial evaluation of left knee pain.  She reports a multiple year history of left knee pain.  There is no history of injury specifically.  She did have partial meniscectomy with arthroscopic surgery about 10 years ago.  She reports bilateral knee pain on a daily basis.  Pain is along the medial joint line.  There is burning around the knee as well.  She has been treated with steroid injections in the past.  She has never had viscosupplementation injection.  She currently takes ibuprofen as well and has a Robaxin to help with pain.  She walks without assistive device. The patient has difficulty climbing stairs, difficulty getting out of a chair, difficulty with activities of daily living including hygiene and pain at night.  Pain level at rest is 7 and with activities 9-10/10.     She does have asthma.  She denies diabetes, heart issues, tobacco use.  There is no history of blood clots or blood thinner usage.  She has help at home.  BMI is 52.      Past Medical History:   Diagnosis Date    Arthritis     Asthma         ROS:  Constitutional: denies fever, chills, weight loss  MSK: denies pain in other joints, muscle aches  Neurological: denies numbness, tingling, weakness    Exam:  Ht 1.676 m (5' 6\")   Wt (!) 146.5 kg (323 lb)   BMI 52.13 kg/m²      Appearance: sitting in exam room chair, appears to be in no acute distress, awake and alert  Resp: unlabored breathing on room air  Skin: warm, dry and intact with out erythema or significant increased temperature  Neuro: grossly intact both lower extremities. Intact sensation to light touch. Motor exam 4+ to 5/5 in all major motor groups.  LLE: Examination reveals that active knee range of motion is 5 to 105 degrees.  There is varus deformity, positive crepitus, positive joint line tenderness, positive antalgic gait.

## 2024-05-21 ENCOUNTER — TELEPHONE (OUTPATIENT)
Dept: ORTHOPEDIC SURGERY | Age: 54
End: 2024-05-21

## 2024-05-31 ENCOUNTER — PREP FOR PROCEDURE (OUTPATIENT)
Dept: ORTHOPEDIC SURGERY | Age: 54
End: 2024-05-31

## 2024-05-31 PROBLEM — M17.12 DEGENERATIVE ARTHRITIS OF LEFT KNEE: Status: ACTIVE | Noted: 2024-05-31

## 2024-07-05 ENCOUNTER — HOSPITAL ENCOUNTER (OUTPATIENT)
Dept: CT IMAGING | Age: 54
Discharge: HOME | End: 2024-07-05
Attending: ORTHOPAEDIC SURGERY
Payer: COMMERCIAL

## 2024-07-05 DIAGNOSIS — M17.12 PRIMARY OSTEOARTHRITIS OF LEFT KNEE: ICD-10-CM

## 2024-07-05 PROCEDURE — 73700 CT LOWER EXTREMITY W/O DYE: CPT

## 2024-07-15 ENCOUNTER — APPOINTMENT (OUTPATIENT)
Dept: PHYSICAL THERAPY | Age: 54
End: 2024-07-15
Attending: ORTHOPAEDIC SURGERY
Payer: COMMERCIAL

## 2024-07-29 ENCOUNTER — HOSPITAL ENCOUNTER (OUTPATIENT)
Dept: PHYSICAL THERAPY | Age: 54
Setting detail: THERAPIES SERIES
Discharge: HOME | End: 2024-07-29
Attending: ORTHOPAEDIC SURGERY
Payer: COMMERCIAL

## 2024-07-29 ENCOUNTER — HOSPITAL ENCOUNTER (OUTPATIENT)
Dept: PREADMISSION TESTING | Age: 54
Discharge: HOME | End: 2024-08-02
Payer: COMMERCIAL

## 2024-07-29 ENCOUNTER — TELEPHONE (OUTPATIENT)
Dept: ORTHOPEDIC SURGERY | Age: 54
End: 2024-07-29

## 2024-07-29 DIAGNOSIS — M17.12 PRIMARY OSTEOARTHRITIS OF LEFT KNEE: ICD-10-CM

## 2024-07-29 LAB
25(OH)D3 SERPL-MCNC: 26.8 NG/ML
ABO + RH BLD: NORMAL
ALBUMIN SERPL-MCNC: 4 G/DL (ref 3.4–5)
BLD GP AB SCN SERPL QL: NORMAL
EST. AVERAGE GLUCOSE BLD GHB EST-MCNC: 111.2 MG/DL
HBA1C MFR BLD: 5.5 %
PREALB SERPL-MCNC: 19.7 MG/DL (ref 20–40)

## 2024-07-29 PROCEDURE — 83036 HEMOGLOBIN GLYCOSYLATED A1C: CPT

## 2024-07-29 PROCEDURE — 97110 THERAPEUTIC EXERCISES: CPT

## 2024-07-29 PROCEDURE — 86850 RBC ANTIBODY SCREEN: CPT

## 2024-07-29 PROCEDURE — 82306 VITAMIN D 25 HYDROXY: CPT

## 2024-07-29 PROCEDURE — 97161 PT EVAL LOW COMPLEX 20 MIN: CPT

## 2024-07-29 PROCEDURE — 82040 ASSAY OF SERUM ALBUMIN: CPT

## 2024-07-29 PROCEDURE — 84134 ASSAY OF PREALBUMIN: CPT

## 2024-07-29 PROCEDURE — 97530 THERAPEUTIC ACTIVITIES: CPT

## 2024-07-29 PROCEDURE — 86901 BLOOD TYPING SEROLOGIC RH(D): CPT

## 2024-07-29 PROCEDURE — 86900 BLOOD TYPING SEROLOGIC ABO: CPT

## 2024-07-29 PROCEDURE — 87641 MR-STAPH DNA AMP PROBE: CPT

## 2024-07-29 NOTE — PROGRESS NOTES
Patient attended JET class on 7/29/2024 . Patient verified surgery for Total Knee replacement. Patient  received patient information and educational JET folder including the following handouts: jet powerpoint, ERAS, incentive spirometry including purpose and how to perform, case management contact information, hand hygiene, preventing constipation, choices for home health care agency list, pre-operative showering techniques and the use of anti-septic 3 days before surgery.  Interviews completed by PT, OT, and Nurse Navigator.  Labs and Tests to be completed/completed as ordered/necessary by outpatient lab if not already completed.  Anti-septic bottle given to patient to take home. Patient  states no further questions or concerns. Patient provided with orthopedic office, after hours clinic, case management, and nurse navigator contact information.    Date Of Surgery: 8/7/2024  Surgeon: Dr Gastelum  Per Patient Will see/Saw Primary care provider on 7/10/2024.     Will see/Saw Specialist Pulmonary . Educated the patient to contact their specialist to notify them of surgical plans and any specific medication instruction that may be needed. Patient verbalized understanding of the information.    HISTORY OF JOINT REPLACEMENT(S): No history of joint replacement    DC Plan: Home    HOME ASSISTANCE - WHO WILL BE STAYING WITH YOU AT HOME FOR FIRST SEVERAL DAYS? adult child , one of her six children    DC TRANSPORTATION: adult child      STEPS INTO HOME: 3 in front, 2 in back (uses back)    STEPS TO BATHROOM/BEDROOM: none - one level    DME NEEDS:  Needs a rolling walker, cane, shower chair, and raised toilet seat, agreeable to hospital durable medical equipment representative and company    LENGTH OF STAY HAS BEEN DISCUSSED WITH THE PATIENT, APPROPRIATE TO HIS/ HER PROCEDURE.  PATIENT HAS BEEN INFORMED THAT THEY WILL BE DISCHARGED WHEN THE PHYSICIAN DEEMS THEM MEDICALLY READY. MOST PATIENTS CAN EXPECT TO BE IN THE HOSPITAL ONE

## 2024-07-29 NOTE — PLAN OF CARE
benefit from Outpatient PT to address the below impairments as well as improve pain, and restore function.     Functional Impairments:   Decreased LE functional ROM, Decreased core/proximal hip strength and neuromuscular control, Decreased LE functional strength , Reduced balance/proprioceptive control, Decreased LE endurance, and Gait instability/impairment    Functional Activity Limitations (from functional questionnaire and intake):  Reduced ability to tolerate prolonged functional positions  Reduced ability or difficulty with changes of positions or transfers between positions  Reduced ability to maintain good posture and demonstrate good body mechanics with sitting, bending, and lifting  Reduced ability to sleep  Reduced ability to perform lifting, reaching, carrying tasks  Reduced ability to squat  Reduced ability to ambulate prolonged functional periods/distances/surfaces  Reduced ability to ascend/descend stairs  reduced ability to kneel  difficulty with self care    Participation Restrictions:   Reduced participation in self care  Reduced participation in home management   Reduced participation in work activities    Classification :   Signs/symptoms consistent with knee OA/hip OA    Barriers to/and or personal factors that will affect rehab potential:   Co-morbidities    Physical Therapy Evaluation Complexity Justification  [x] A history of present problem and 3 or more personal factors and/or co-morbidities that impact the plan of care  [x] A total of 3 elements found upon examination of body systems using standardized tests and measures addressing any of the following: body structures, functions (impairments), activity limitations, and/or participation restrictions  [x] A clinical presentation with stable and/or uncomplicated characteristics   [x] Clinical decision making of LOW (26032 - Typically 20 minutes face-to-face) complexity using standardized patient assessment instrument and/or measurable

## 2024-07-30 LAB — MRSA DNA SPEC QL NAA+PROBE: NORMAL

## 2024-07-31 RX ORDER — M-VIT,TX,IRON,MINS/CALC/FOLIC 27MG-0.4MG
1 TABLET ORAL DAILY
COMMUNITY

## 2024-07-31 NOTE — PROGRESS NOTES
[facility] Pre-Admission Testing Electronic Communication Worksheet for OR/ENDO Procedures        Patient: Olivia Garcia    Transportation Confirmed: [x] YES    []  NO    History and Physical:  [x] YES    []  NO  [] N/A  If yes, please list doctor or Urgent Care and date of H&P: In notes Jovan BARRIENTOS    Additional Clearance(Cardiac, Pulmonary, etc):  [] YES    []  NO    Pre-Admission Testing Visit:  [x] YES    []  NO If no, do labs/testing need to be done DOS?  [] YES    []  NO    Medication Reconciliation Complete:  [x] YES    []  NO        Additional Notes:                Interview Complete: [x] YES    []  NO          Amy Corado, RN  3:42 PM

## 2024-07-31 NOTE — PROGRESS NOTES
PRE-OP INSTRUCTIONS     Do not eat or drink anything after 12:00 midnight prior to surgery or per Dr Gastelum  This includes water, chewing gum, mints and ice chips.    You may brush your teeth and gargle the morning of surgery but DO  NOT SWALLOW THE WATER.    Take the following medications with a small sip of water on the morning of procedure...Follow your MD/Surgeons pre-procedure instructions regarding your medications     You may be asked to stop blood thinners such as:  Coumadin, Plavix, Fragmin and lovenox.  Please check with your doctor before stopping these or any other medications.    Aspirin, ibuprofen, advil and naproxen, any anti-inflammatory products should be stopped for a week prior to your surgery.    Do not smoke and do not drink any alcoholic beverages 24 hours prior to your surgery.    Please do not wear any jewelry or body piercings on the day of surgery.    Please wear something simple, loose fitting clothing to the hospital.  Do not wear any make-up(including eye make-up) or nail polish on your fingers and toes.    As part of our patient safety program to minimize surgical infections, we ask you to do the following:    Please notify your surgeon if you develop any illness between now and the day of your surgery.  This includes a cough, cold, fever, sore  throat, nausea, vomiting, diarrhea, etc.   Please notify your surgeon if you experience dizziness, shortness of  breath or blurred vision between now and the time of your surgery.     Please notify your surgeon of any open or redden areas that may look infected       DO NOT shave your operative site 96 hours(four days) prior to surgery.          Shower the week before surgery with an antibacterial soap such as:dial,safeguard, etc.       Three(3) days prior to your surgery, cleanse the operative site with Hibiclens(anti-microbial soap). This soap may dry your skin, please do not apply any oils or lotions     Please bring your insurance card and

## 2024-07-31 NOTE — PROGRESS NOTES
Follow Up Prior to Surgery    DOS:   :1970      History and Physical:H&P in [facility] had ECHO done at  and pt states Dr Leon sent a message in My Chart clearing her, she will call office to have it FAXED to PAT and/or stop by office tomorrow before her appt with Dr Gastelum and bring then for it to be scanned.    UPDATE: Echo scanned into pt`s chart called Jovan`s office [phone] spoke with Nakita , she will have addendum to note and fax to PAT when completed.

## 2024-08-01 ENCOUNTER — OFFICE VISIT (OUTPATIENT)
Dept: ORTHOPEDIC SURGERY | Age: 54
End: 2024-08-01
Payer: COMMERCIAL

## 2024-08-01 ENCOUNTER — TELEPHONE (OUTPATIENT)
Dept: ORTHOPEDIC SURGERY | Age: 54
End: 2024-08-01

## 2024-08-01 VITALS — HEIGHT: 66 IN | WEIGHT: 293 LBS | BODY MASS INDEX: 47.09 KG/M2

## 2024-08-01 DIAGNOSIS — M17.12 PRIMARY OSTEOARTHRITIS OF LEFT KNEE: Primary | ICD-10-CM

## 2024-08-01 PROCEDURE — 99214 OFFICE O/P EST MOD 30 MIN: CPT | Performed by: ORTHOPAEDIC SURGERY

## 2024-08-01 NOTE — PROGRESS NOTES
[facility]  Office Visit    Chief Complaint: Left knee pain    HPI:  Olivia Garcia is a 54 y.o. who is here in follow-up of left knee pain.  She is scheduled for left total knee arthroplasty next week.  For review, she reports a multiple year history of left knee pain.  There is no history of injury specifically.  She did have partial meniscectomy with arthroscopic surgery about 10 years ago.  She reports bilateral knee pain on a daily basis.  Pain is along the medial joint line.  There is burning around the knee as well.  She has been treated with steroid injections in the past.  She has never had viscosupplementation injection.  She currently takes ibuprofen as well and has a Robaxin to help with pain.  She walks without assistive device. The patient has difficulty climbing stairs, difficulty getting out of a chair, difficulty with activities of daily living including hygiene and pain at night.  Pain level at rest is 7 and with activities 9-10/10.     She does have asthma.  She denies diabetes, heart issues, tobacco use.  There is no history of blood clots or blood thinner usage.  She has help at home.  BMI is 52.      Past Medical History:   Diagnosis Date    Arthritis     Asthma     COVID 2020    with plasma transfusion    Hypertension         ROS:  Constitutional: denies fever, chills, weight loss  MSK: denies pain in other joints, muscle aches  Neurological: denies numbness, tingling, weakness    Exam:  Ht 1.676 m (5' 6\")   Wt (!) 145.6 kg (321 lb)   BMI 51.81 kg/m²      Appearance: sitting in exam room chair, appears to be in no acute distress, awake and alert  Resp: unlabored breathing on room air  Skin: warm, dry and intact with out erythema or significant increased temperature  Neuro: grossly intact both lower extremities. Intact sensation to light touch. Motor exam 4+ to 5/5 in all major motor groups.  LLE: Examination reveals that active knee range of motion is 5 to 105

## 2024-08-01 NOTE — PROGRESS NOTES
Notification sent to Dr Gastelum and medical assistant Vandana ALEXANDRE regarding abnormal preoperative labs and pertinent medical history.

## 2024-08-02 NOTE — DISCHARGE INSTR - COC
[facility] Continuity of Care Form    Patient Name:  Olivia Garcia  : 1970    MRN:  [id number]    Admit date:  (Not on file)  Discharge date:  24    Code Status Order: Prior  Advance Directives: No    Admitting Physician: Thierno Gastelum MD  PCP: Vandana Leon MD    Discharging Nurse: Ozzy  Discharging Hospital Unit/Room#: No information available for this encounter.  Discharging Unit Phone Number: [phone]    Emergency Contact:        Past Surgical History:  Past Surgical History:   Procedure Laterality Date    CARPAL TUNNEL RELEASE      2 ON EACH SIDE    CHOLECYSTECTOMY      COLONOSCOPY N/A 2022    COLONOSCOPY performed by Ignacio Greene MD at [facility] ENDOSCOPY    HERNIA REPAIR      X2 umbilical    HYSTERECTOMY (CERVIX STATUS UNKNOWN)      KNEE ARTHROSCOPY Bilateral        Immunization History:   Immunization History   Administered Date(s) Administered    COVID-19, PFIZER PURPLE top, DILUTE for use, (age 12 y+), 30mcg/0.3mL 2021, 10/21/2021       Active Problems:  Principal Problem:    Degenerative arthritis of left knee  Resolved Problems:    * No resolved hospital problems. *      Isolation/Infection:       Nurse Assessment:  Last Vital Signs:Ht 1.676 m (5' 6\")   Wt (!) 145.6 kg (321 lb)   BMI 51.81 kg/m²   Last documented pain score (0-10 scale):    Last Weight:   Wt Readings from Last 1 Encounters:   24 (!) 145.6 kg (321 lb)     Mental Status:  oriented and alert     IV Access:  - None    Nursing Mobility/ADLs:  Walking   Assisted  Transfer  Assisted  Bathing  Assisted  Dressing  Assisted  Toileting  Assisted  Feeding  Independent  Med Admin  Independent  Med Delivery   whole    Wound Care Documentation and Therapy:  Keep glued Prineo dressing clean and intact. DO NOT remove. Prineo is waterproof for showering. Doctor will evaluated dressing for removal at office visit 2 weeks after surgery        Elimination:  Urinary Catheter: None   Colostomy/Ileostomy:  four times a day as needed for added pain relief.  DO not exceed 3000mg in 24 hours.   Call Your Doctor or the doctor on call ([phone]) If:  You have increased pain not controlled by medications. Or need medication refills.   Excessive swelling in your ankle.  You develop numbness, tingling, or decreased movement.  You have a fever greater than 100 degrees for a day or over 101 degrees at any one time.  If you fall.    You have any questions about your recovery.  Inform your family doctor/dentist or any other doctor who cares for you in the future that you have a joint replacement.  They may want to order antibiotics for dental or surgical procedures.  If you have required the use of insulin to control your blood sugar after surgery, follow up with your family doctor.   If you have any questions regarding your discharge instructions, please call Idalia Lopez Nurse Navigator [phone] or your surgeon's office.

## 2024-08-05 NOTE — PROGRESS NOTES
Follow Up Prior to Surgery    DOS:   :1970      History and Physical:H&P in [facility] had ECHO done at  and pt states Dr Leon sent a message in My Chart clearing her, she will call office to have it FAXED to PAT and/or stop by office tomorrow before her appt with Dr Gastelum and bring then for it to be scanned.    UPDATE: Echo scanned into pt`s chart called Jovan`s office [phone] spoke with Nakita , she will have addendum to note and fax to [facility] when completed.    UPDATE: H&P completed  / Dr Aldana in [facility]  Medically Cleared

## 2024-08-06 ENCOUNTER — ANESTHESIA EVENT (OUTPATIENT)
Dept: OPERATING ROOM | Age: 54
End: 2024-08-06
Payer: COMMERCIAL

## 2024-08-07 ENCOUNTER — ANESTHESIA (OUTPATIENT)
Dept: OPERATING ROOM | Age: 54
End: 2024-08-07
Payer: COMMERCIAL

## 2024-08-07 ENCOUNTER — APPOINTMENT (OUTPATIENT)
Dept: GENERAL RADIOLOGY | Age: 54
End: 2024-08-07
Attending: ORTHOPAEDIC SURGERY
Payer: COMMERCIAL

## 2024-08-07 ENCOUNTER — HOSPITAL ENCOUNTER (OUTPATIENT)
Age: 54
Setting detail: OBSERVATION
Discharge: HOME | End: 2024-08-08
Attending: ORTHOPAEDIC SURGERY | Admitting: ORTHOPAEDIC SURGERY
Payer: COMMERCIAL

## 2024-08-07 DIAGNOSIS — M17.12 ARTHRITIS OF LEFT KNEE: Primary | ICD-10-CM

## 2024-08-07 LAB
ABO + RH BLD: NORMAL
BLD GP AB SCN SERPL QL: NORMAL
GLUCOSE BLD-MCNC: 133 MG/DL (ref 70–99)
GLUCOSE BLD-MCNC: 182 MG/DL (ref 70–99)
GLUCOSE BLD-MCNC: 205 MG/DL (ref 70–99)
PERFORMED ON: ABNORMAL

## 2024-08-07 PROCEDURE — 2580000003 HC RX 258: Performed by: ORTHOPAEDIC SURGERY

## 2024-08-07 PROCEDURE — 6360000002 HC RX W HCPCS: Performed by: ANESTHESIOLOGY

## 2024-08-07 PROCEDURE — C1776 JOINT DEVICE (IMPLANTABLE): HCPCS | Performed by: ORTHOPAEDIC SURGERY

## 2024-08-07 PROCEDURE — G0378 HOSPITAL OBSERVATION PER HR: HCPCS

## 2024-08-07 PROCEDURE — 7100000000 HC PACU RECOVERY - FIRST 15 MIN: Performed by: ORTHOPAEDIC SURGERY

## 2024-08-07 PROCEDURE — 27447 TOTAL KNEE ARTHROPLASTY: CPT | Performed by: PHYSICIAN ASSISTANT

## 2024-08-07 PROCEDURE — 64447 NJX AA&/STRD FEMORAL NRV IMG: CPT | Performed by: ANESTHESIOLOGY

## 2024-08-07 PROCEDURE — 6360000002 HC RX W HCPCS: Performed by: ORTHOPAEDIC SURGERY

## 2024-08-07 PROCEDURE — A4217 STERILE WATER/SALINE, 500 ML: HCPCS | Performed by: ORTHOPAEDIC SURGERY

## 2024-08-07 PROCEDURE — 6370000000 HC RX 637 (ALT 250 FOR IP): Performed by: ORTHOPAEDIC SURGERY

## 2024-08-07 PROCEDURE — 3700000001 HC ADD 15 MINUTES (ANESTHESIA): Performed by: ORTHOPAEDIC SURGERY

## 2024-08-07 PROCEDURE — 2720000010 HC SURG SUPPLY STERILE: Performed by: ORTHOPAEDIC SURGERY

## 2024-08-07 PROCEDURE — 2700000000 HC OXYGEN THERAPY PER DAY

## 2024-08-07 PROCEDURE — 3600000005 HC SURGERY LEVEL 5 BASE: Performed by: ORTHOPAEDIC SURGERY

## 2024-08-07 PROCEDURE — 3700000000 HC ANESTHESIA ATTENDED CARE: Performed by: ORTHOPAEDIC SURGERY

## 2024-08-07 PROCEDURE — 94760 N-INVAS EAR/PLS OXIMETRY 1: CPT

## 2024-08-07 PROCEDURE — 2500000003 HC RX 250 WO HCPCS

## 2024-08-07 PROCEDURE — 73560 X-RAY EXAM OF KNEE 1 OR 2: CPT

## 2024-08-07 PROCEDURE — 6360000002 HC RX W HCPCS

## 2024-08-07 PROCEDURE — 7100000001 HC PACU RECOVERY - ADDTL 15 MIN: Performed by: ORTHOPAEDIC SURGERY

## 2024-08-07 PROCEDURE — 86900 BLOOD TYPING SEROLOGIC ABO: CPT

## 2024-08-07 PROCEDURE — C9290 INJ, BUPIVACAINE LIPOSOME: HCPCS | Performed by: ORTHOPAEDIC SURGERY

## 2024-08-07 PROCEDURE — 94640 AIRWAY INHALATION TREATMENT: CPT

## 2024-08-07 PROCEDURE — 27447 TOTAL KNEE ARTHROPLASTY: CPT | Performed by: ORTHOPAEDIC SURGERY

## 2024-08-07 PROCEDURE — 2709999900 HC NON-CHARGEABLE SUPPLY: Performed by: ORTHOPAEDIC SURGERY

## 2024-08-07 PROCEDURE — 6370000000 HC RX 637 (ALT 250 FOR IP): Performed by: NURSE PRACTITIONER

## 2024-08-07 PROCEDURE — 2580000003 HC RX 258: Performed by: ANESTHESIOLOGY

## 2024-08-07 PROCEDURE — 3600000015 HC SURGERY LEVEL 5 ADDTL 15MIN: Performed by: ORTHOPAEDIC SURGERY

## 2024-08-07 PROCEDURE — 86850 RBC ANTIBODY SCREEN: CPT

## 2024-08-07 PROCEDURE — 20985 CPTR-ASST DIR MS PX: CPT | Performed by: ORTHOPAEDIC SURGERY

## 2024-08-07 PROCEDURE — 86901 BLOOD TYPING SEROLOGIC RH(D): CPT

## 2024-08-07 DEVICE — TIBIAL BEARING INSERT - CS
Type: IMPLANTABLE DEVICE | Site: KNEE | Status: FUNCTIONAL
Brand: TRIATHLON

## 2024-08-07 DEVICE — TIBIAL COMPONENT
Type: IMPLANTABLE DEVICE | Site: KNEE | Status: FUNCTIONAL
Brand: TRIATHLON

## 2024-08-07 DEVICE — PATELLA
Type: IMPLANTABLE DEVICE | Site: KNEE | Status: FUNCTIONAL
Brand: TRIATHLON

## 2024-08-07 DEVICE — CRUCIATE RETAINING FEMORAL
Type: IMPLANTABLE DEVICE | Site: KNEE | Status: FUNCTIONAL
Brand: TRIATHLON

## 2024-08-07 RX ORDER — MELOXICAM 7.5 MG/1
7.5 TABLET ORAL DAILY
Status: CANCELLED | OUTPATIENT
Start: 2024-08-08

## 2024-08-07 RX ORDER — FENTANYL CITRATE 50 UG/ML
50 INJECTION, SOLUTION INTRAMUSCULAR; INTRAVENOUS ONCE
Status: COMPLETED | OUTPATIENT
Start: 2024-08-07 | End: 2024-08-07

## 2024-08-07 RX ORDER — NALOXONE HYDROCHLORIDE 0.4 MG/ML
INJECTION, SOLUTION INTRAMUSCULAR; INTRAVENOUS; SUBCUTANEOUS PRN
Status: DISCONTINUED | OUTPATIENT
Start: 2024-08-07 | End: 2024-08-07 | Stop reason: HOSPADM

## 2024-08-07 RX ORDER — CALCIUM CARBONATE 500 MG/1
500 TABLET, CHEWABLE ORAL 3 TIMES DAILY PRN
Status: DISCONTINUED | OUTPATIENT
Start: 2024-08-07 | End: 2024-08-08 | Stop reason: HOSPADM

## 2024-08-07 RX ORDER — CETIRIZINE HYDROCHLORIDE 10 MG/1
10 TABLET ORAL NIGHTLY
Status: DISCONTINUED | OUTPATIENT
Start: 2024-08-07 | End: 2024-08-08 | Stop reason: HOSPADM

## 2024-08-07 RX ORDER — OXYCODONE HYDROCHLORIDE 5 MG/1
5 TABLET ORAL EVERY 4 HOURS PRN
Status: DISCONTINUED | OUTPATIENT
Start: 2024-08-07 | End: 2024-08-08 | Stop reason: HOSPADM

## 2024-08-07 RX ORDER — SODIUM CHLORIDE 0.9 % (FLUSH) 0.9 %
5-40 SYRINGE (ML) INJECTION EVERY 12 HOURS SCHEDULED
Status: DISCONTINUED | OUTPATIENT
Start: 2024-08-07 | End: 2024-08-07 | Stop reason: HOSPADM

## 2024-08-07 RX ORDER — ROCURONIUM BROMIDE 10 MG/ML
INJECTION, SOLUTION INTRAVENOUS PRN
Status: DISCONTINUED | OUTPATIENT
Start: 2024-08-07 | End: 2024-08-07 | Stop reason: SDUPTHER

## 2024-08-07 RX ORDER — INSULIN LISPRO 100 [IU]/ML
0.08 INJECTION, SOLUTION INTRAVENOUS; SUBCUTANEOUS
Status: DISCONTINUED | OUTPATIENT
Start: 2024-08-07 | End: 2024-08-08 | Stop reason: HOSPADM

## 2024-08-07 RX ORDER — LOSARTAN POTASSIUM 25 MG/1
25 TABLET ORAL DAILY
Status: DISCONTINUED | OUTPATIENT
Start: 2024-08-08 | End: 2024-08-07

## 2024-08-07 RX ORDER — ACETAMINOPHEN 325 MG/1
650 TABLET ORAL EVERY 6 HOURS
Status: DISCONTINUED | OUTPATIENT
Start: 2024-08-07 | End: 2024-08-08 | Stop reason: HOSPADM

## 2024-08-07 RX ORDER — DEXTROSE MONOHYDRATE 100 MG/ML
INJECTION, SOLUTION INTRAVENOUS CONTINUOUS PRN
Status: DISCONTINUED | OUTPATIENT
Start: 2024-08-07 | End: 2024-08-08 | Stop reason: HOSPADM

## 2024-08-07 RX ORDER — SODIUM CHLORIDE 0.9 % (FLUSH) 0.9 %
5-40 SYRINGE (ML) INJECTION PRN
Status: DISCONTINUED | OUTPATIENT
Start: 2024-08-07 | End: 2024-08-08 | Stop reason: HOSPADM

## 2024-08-07 RX ORDER — IBUPROFEN 800 MG/1
800 TABLET ORAL EVERY 6 HOURS PRN
Qty: 120 TABLET | Refills: 3
Start: 2024-08-07

## 2024-08-07 RX ORDER — SODIUM CHLORIDE 0.9 % (FLUSH) 0.9 %
5-40 SYRINGE (ML) INJECTION PRN
Status: DISCONTINUED | OUTPATIENT
Start: 2024-08-07 | End: 2024-08-07 | Stop reason: HOSPADM

## 2024-08-07 RX ORDER — MELOXICAM 7.5 MG/1
15 TABLET ORAL ONCE
Status: COMPLETED | OUTPATIENT
Start: 2024-08-07 | End: 2024-08-07

## 2024-08-07 RX ORDER — ROPIVACAINE HYDROCHLORIDE 5 MG/ML
30 INJECTION, SOLUTION EPIDURAL; INFILTRATION; PERINEURAL ONCE
Status: COMPLETED | OUTPATIENT
Start: 2024-08-07 | End: 2024-08-07

## 2024-08-07 RX ORDER — ALBUTEROL SULFATE 90 UG/1
2 AEROSOL, METERED RESPIRATORY (INHALATION) EVERY 6 HOURS PRN
Status: DISCONTINUED | OUTPATIENT
Start: 2024-08-07 | End: 2024-08-08 | Stop reason: HOSPADM

## 2024-08-07 RX ORDER — MORPHINE SULFATE 2 MG/ML
2 INJECTION, SOLUTION INTRAMUSCULAR; INTRAVENOUS
Status: DISCONTINUED | OUTPATIENT
Start: 2024-08-07 | End: 2024-08-08 | Stop reason: HOSPADM

## 2024-08-07 RX ORDER — HYDROCHLOROTHIAZIDE 25 MG/1
25 TABLET ORAL DAILY
Status: DISCONTINUED | OUTPATIENT
Start: 2024-08-08 | End: 2024-08-08 | Stop reason: HOSPADM

## 2024-08-07 RX ORDER — LABETALOL HYDROCHLORIDE 5 MG/ML
5 INJECTION, SOLUTION INTRAVENOUS ONCE
Status: COMPLETED | OUTPATIENT
Start: 2024-08-07 | End: 2024-08-07

## 2024-08-07 RX ORDER — ROPIVACAINE HYDROCHLORIDE 5 MG/ML
INJECTION, SOLUTION EPIDURAL; INFILTRATION; PERINEURAL
Status: COMPLETED | OUTPATIENT
Start: 2024-08-07 | End: 2024-08-07

## 2024-08-07 RX ORDER — TRANEXAMIC ACID 650 MG/1
1950 TABLET ORAL ONCE
Status: COMPLETED | OUTPATIENT
Start: 2024-08-07 | End: 2024-08-07

## 2024-08-07 RX ORDER — ACETAMINOPHEN 500 MG
1000 TABLET ORAL ONCE
Status: COMPLETED | OUTPATIENT
Start: 2024-08-07 | End: 2024-08-07

## 2024-08-07 RX ORDER — INSULIN GLARGINE 100 [IU]/ML
0.25 INJECTION, SOLUTION SUBCUTANEOUS NIGHTLY
Status: DISCONTINUED | OUTPATIENT
Start: 2024-08-07 | End: 2024-08-08 | Stop reason: HOSPADM

## 2024-08-07 RX ORDER — GLUCAGON 1 MG/ML
1 KIT INJECTION PRN
Status: DISCONTINUED | OUTPATIENT
Start: 2024-08-07 | End: 2024-08-08 | Stop reason: HOSPADM

## 2024-08-07 RX ORDER — DULOXETIN HYDROCHLORIDE 60 MG/1
60 CAPSULE, DELAYED RELEASE ORAL ONCE
Status: COMPLETED | OUTPATIENT
Start: 2024-08-07 | End: 2024-08-07

## 2024-08-07 RX ORDER — ONDANSETRON 2 MG/ML
INJECTION INTRAMUSCULAR; INTRAVENOUS PRN
Status: DISCONTINUED | OUTPATIENT
Start: 2024-08-07 | End: 2024-08-07 | Stop reason: SDUPTHER

## 2024-08-07 RX ORDER — LIDOCAINE HYDROCHLORIDE 20 MG/ML
INJECTION, SOLUTION EPIDURAL; INFILTRATION; INTRACAUDAL; PERINEURAL PRN
Status: DISCONTINUED | OUTPATIENT
Start: 2024-08-07 | End: 2024-08-07 | Stop reason: SDUPTHER

## 2024-08-07 RX ORDER — INSULIN LISPRO 100 [IU]/ML
0-4 INJECTION, SOLUTION INTRAVENOUS; SUBCUTANEOUS
Status: DISCONTINUED | OUTPATIENT
Start: 2024-08-07 | End: 2024-08-08 | Stop reason: HOSPADM

## 2024-08-07 RX ORDER — KETOROLAC TROMETHAMINE 15 MG/ML
15 INJECTION, SOLUTION INTRAMUSCULAR; INTRAVENOUS
Status: COMPLETED | OUTPATIENT
Start: 2024-08-07 | End: 2024-08-08

## 2024-08-07 RX ORDER — HYDRALAZINE HYDROCHLORIDE 20 MG/ML
5 INJECTION INTRAMUSCULAR; INTRAVENOUS ONCE
Status: COMPLETED | OUTPATIENT
Start: 2024-08-07 | End: 2024-08-07

## 2024-08-07 RX ORDER — PROPOFOL 10 MG/ML
INJECTION, EMULSION INTRAVENOUS PRN
Status: DISCONTINUED | OUTPATIENT
Start: 2024-08-07 | End: 2024-08-07 | Stop reason: SDUPTHER

## 2024-08-07 RX ORDER — OXYCODONE HYDROCHLORIDE 10 MG/1
10 TABLET ORAL EVERY 4 HOURS PRN
Qty: 1 TABLET | Refills: 0 | Status: SHIPPED | OUTPATIENT
Start: 2024-08-07 | End: 2024-08-12 | Stop reason: SDUPTHER

## 2024-08-07 RX ORDER — ONDANSETRON 4 MG/1
4 TABLET, ORALLY DISINTEGRATING ORAL EVERY 8 HOURS PRN
Status: DISCONTINUED | OUTPATIENT
Start: 2024-08-07 | End: 2024-08-08 | Stop reason: HOSPADM

## 2024-08-07 RX ORDER — INSULIN LISPRO 100 [IU]/ML
0-4 INJECTION, SOLUTION INTRAVENOUS; SUBCUTANEOUS NIGHTLY
Status: DISCONTINUED | OUTPATIENT
Start: 2024-08-07 | End: 2024-08-08 | Stop reason: HOSPADM

## 2024-08-07 RX ORDER — OXYCODONE HYDROCHLORIDE 10 MG/1
10 TABLET ORAL EVERY 4 HOURS PRN
Status: DISCONTINUED | OUTPATIENT
Start: 2024-08-07 | End: 2024-08-08 | Stop reason: HOSPADM

## 2024-08-07 RX ORDER — MORPHINE SULFATE 4 MG/ML
4 INJECTION, SOLUTION INTRAMUSCULAR; INTRAVENOUS
Status: DISCONTINUED | OUTPATIENT
Start: 2024-08-07 | End: 2024-08-08 | Stop reason: HOSPADM

## 2024-08-07 RX ORDER — SODIUM CHLORIDE 9 MG/ML
INJECTION, SOLUTION INTRAVENOUS PRN
Status: DISCONTINUED | OUTPATIENT
Start: 2024-08-07 | End: 2024-08-07 | Stop reason: HOSPADM

## 2024-08-07 RX ORDER — SODIUM CHLORIDE 450 MG/100ML
INJECTION, SOLUTION INTRAVENOUS CONTINUOUS
Status: DISCONTINUED | OUTPATIENT
Start: 2024-08-07 | End: 2024-08-08 | Stop reason: HOSPADM

## 2024-08-07 RX ORDER — DULOXETIN HYDROCHLORIDE 60 MG/1
60 CAPSULE, DELAYED RELEASE ORAL DAILY
Status: DISCONTINUED | OUTPATIENT
Start: 2024-08-08 | End: 2024-08-08 | Stop reason: HOSPADM

## 2024-08-07 RX ORDER — FENTANYL CITRATE 50 UG/ML
INJECTION, SOLUTION INTRAMUSCULAR; INTRAVENOUS PRN
Status: DISCONTINUED | OUTPATIENT
Start: 2024-08-07 | End: 2024-08-07 | Stop reason: SDUPTHER

## 2024-08-07 RX ORDER — METHOCARBAMOL 750 MG/1
750 TABLET, FILM COATED ORAL 3 TIMES DAILY PRN
Status: DISCONTINUED | OUTPATIENT
Start: 2024-08-07 | End: 2024-08-08 | Stop reason: HOSPADM

## 2024-08-07 RX ORDER — OXYCODONE HYDROCHLORIDE 10 MG/1
10 TABLET ORAL EVERY 4 HOURS PRN
Qty: 1 TABLET | Refills: 0
Start: 2024-08-07 | End: 2024-08-07

## 2024-08-07 RX ORDER — DULOXETIN HYDROCHLORIDE 60 MG/1
60 CAPSULE, DELAYED RELEASE ORAL DAILY
Qty: 14 CAPSULE | Refills: 0 | Status: SHIPPED | OUTPATIENT
Start: 2024-08-07 | End: 2024-08-21

## 2024-08-07 RX ORDER — HYDRALAZINE HYDROCHLORIDE 20 MG/ML
INJECTION INTRAMUSCULAR; INTRAVENOUS
Status: COMPLETED
Start: 2024-08-07 | End: 2024-08-07

## 2024-08-07 RX ORDER — BUDESONIDE AND FORMOTEROL FUMARATE DIHYDRATE 160; 4.5 UG/1; UG/1
2 AEROSOL RESPIRATORY (INHALATION)
Status: DISCONTINUED | OUTPATIENT
Start: 2024-08-07 | End: 2024-08-08 | Stop reason: HOSPADM

## 2024-08-07 RX ORDER — ONDANSETRON 2 MG/ML
4 INJECTION INTRAMUSCULAR; INTRAVENOUS
Status: DISCONTINUED | OUTPATIENT
Start: 2024-08-07 | End: 2024-08-07 | Stop reason: HOSPADM

## 2024-08-07 RX ORDER — MAGNESIUM HYDROXIDE 1200 MG/15ML
LIQUID ORAL CONTINUOUS PRN
Status: COMPLETED | OUTPATIENT
Start: 2024-08-07 | End: 2024-08-07

## 2024-08-07 RX ORDER — SODIUM CHLORIDE 9 MG/ML
INJECTION, SOLUTION INTRAVENOUS PRN
Status: DISCONTINUED | OUTPATIENT
Start: 2024-08-07 | End: 2024-08-08 | Stop reason: HOSPADM

## 2024-08-07 RX ORDER — LOSARTAN POTASSIUM 25 MG/1
25 TABLET ORAL DAILY
Status: DISCONTINUED | OUTPATIENT
Start: 2024-08-07 | End: 2024-08-08 | Stop reason: HOSPADM

## 2024-08-07 RX ORDER — DEXAMETHASONE SODIUM PHOSPHATE 4 MG/ML
INJECTION, SOLUTION INTRA-ARTICULAR; INTRALESIONAL; INTRAMUSCULAR; INTRAVENOUS; SOFT TISSUE PRN
Status: DISCONTINUED | OUTPATIENT
Start: 2024-08-07 | End: 2024-08-07 | Stop reason: SDUPTHER

## 2024-08-07 RX ORDER — POLYETHYLENE GLYCOL 3350 17 G/17G
17 POWDER, FOR SOLUTION ORAL DAILY PRN
Status: DISCONTINUED | OUTPATIENT
Start: 2024-08-07 | End: 2024-08-08 | Stop reason: HOSPADM

## 2024-08-07 RX ORDER — ONDANSETRON 2 MG/ML
4 INJECTION INTRAMUSCULAR; INTRAVENOUS EVERY 6 HOURS PRN
Status: DISCONTINUED | OUTPATIENT
Start: 2024-08-07 | End: 2024-08-08 | Stop reason: HOSPADM

## 2024-08-07 RX ORDER — FENTANYL CITRATE 0.05 MG/ML
25 INJECTION, SOLUTION INTRAMUSCULAR; INTRAVENOUS EVERY 5 MIN PRN
Status: DISCONTINUED | OUTPATIENT
Start: 2024-08-07 | End: 2024-08-07 | Stop reason: HOSPADM

## 2024-08-07 RX ORDER — SODIUM CHLORIDE 0.9 % (FLUSH) 0.9 %
5-40 SYRINGE (ML) INJECTION EVERY 12 HOURS SCHEDULED
Status: DISCONTINUED | OUTPATIENT
Start: 2024-08-07 | End: 2024-08-08 | Stop reason: HOSPADM

## 2024-08-07 RX ORDER — MONTELUKAST SODIUM 10 MG/1
10 TABLET ORAL NIGHTLY
Status: DISCONTINUED | OUTPATIENT
Start: 2024-08-07 | End: 2024-08-08 | Stop reason: HOSPADM

## 2024-08-07 RX ADMIN — PROPOFOL 50 MG: 10 INJECTION, EMULSION INTRAVENOUS at 12:16

## 2024-08-07 RX ADMIN — OXYCODONE HYDROCHLORIDE 10 MG: 10 TABLET ORAL at 15:14

## 2024-08-07 RX ADMIN — SUGAMMADEX 250 MG: 100 INJECTION, SOLUTION INTRAVENOUS at 13:01

## 2024-08-07 RX ADMIN — FENTANYL CITRATE 50 MCG: 50 INJECTION INTRAMUSCULAR; INTRAVENOUS at 11:10

## 2024-08-07 RX ADMIN — HYDROMORPHONE HYDROCHLORIDE 0.5 MG: 1 INJECTION, SOLUTION INTRAMUSCULAR; INTRAVENOUS; SUBCUTANEOUS at 12:08

## 2024-08-07 RX ADMIN — PROPOFOL 50 MG: 10 INJECTION, EMULSION INTRAVENOUS at 12:00

## 2024-08-07 RX ADMIN — APIXABAN 2.5 MG: 2.5 TABLET, FILM COATED ORAL at 20:54

## 2024-08-07 RX ADMIN — HYDROMORPHONE HYDROCHLORIDE 0.5 MG: 1 INJECTION, SOLUTION INTRAMUSCULAR; INTRAVENOUS; SUBCUTANEOUS at 13:57

## 2024-08-07 RX ADMIN — HYDROMORPHONE HYDROCHLORIDE 0.5 MG: 1 INJECTION, SOLUTION INTRAMUSCULAR; INTRAVENOUS; SUBCUTANEOUS at 13:42

## 2024-08-07 RX ADMIN — HYDROMORPHONE HYDROCHLORIDE 0.5 MG: 1 INJECTION, SOLUTION INTRAMUSCULAR; INTRAVENOUS; SUBCUTANEOUS at 14:24

## 2024-08-07 RX ADMIN — INSULIN GLARGINE 36 UNITS: 100 INJECTION, SOLUTION SUBCUTANEOUS at 20:56

## 2024-08-07 RX ADMIN — ROPIVACAINE HYDROCHLORIDE 30 ML: 5 INJECTION, SOLUTION EPIDURAL; INFILTRATION; PERINEURAL at 11:13

## 2024-08-07 RX ADMIN — Medication 50 MG: at 11:45

## 2024-08-07 RX ADMIN — HYDROMORPHONE HYDROCHLORIDE 0.5 MG: 1 INJECTION, SOLUTION INTRAMUSCULAR; INTRAVENOUS; SUBCUTANEOUS at 13:30

## 2024-08-07 RX ADMIN — LABETALOL HYDROCHLORIDE 5 MG: 5 INJECTION INTRAVENOUS at 14:06

## 2024-08-07 RX ADMIN — SODIUM CHLORIDE 3000 MG: 900 INJECTION INTRAVENOUS at 11:38

## 2024-08-07 RX ADMIN — MONTELUKAST 10 MG: 10 TABLET, FILM COATED ORAL at 20:54

## 2024-08-07 RX ADMIN — ACETAMINOPHEN 325MG 650 MG: 325 TABLET ORAL at 20:54

## 2024-08-07 RX ADMIN — SODIUM CHLORIDE: 9 INJECTION, SOLUTION INTRAVENOUS at 10:30

## 2024-08-07 RX ADMIN — HYDRALAZINE HYDROCHLORIDE 5 MG: 20 INJECTION INTRAMUSCULAR; INTRAVENOUS at 14:25

## 2024-08-07 RX ADMIN — ACETAMINOPHEN 325MG 650 MG: 325 TABLET ORAL at 15:20

## 2024-08-07 RX ADMIN — DEXAMETHASONE SODIUM PHOSPHATE 8 MG: 4 INJECTION, SOLUTION INTRAMUSCULAR; INTRAVENOUS at 11:45

## 2024-08-07 RX ADMIN — SODIUM CHLORIDE: 4.5 INJECTION, SOLUTION INTRAVENOUS at 15:14

## 2024-08-07 RX ADMIN — HYDROMORPHONE HYDROCHLORIDE 0.5 MG: 1 INJECTION, SOLUTION INTRAMUSCULAR; INTRAVENOUS; SUBCUTANEOUS at 12:00

## 2024-08-07 RX ADMIN — ONDANSETRON 4 MG: 2 INJECTION INTRAMUSCULAR; INTRAVENOUS at 11:45

## 2024-08-07 RX ADMIN — DULOXETINE HYDROCHLORIDE 60 MG: 60 CAPSULE, DELAYED RELEASE ORAL at 10:31

## 2024-08-07 RX ADMIN — LOSARTAN POTASSIUM 25 MG: 25 TABLET, FILM COATED ORAL at 15:44

## 2024-08-07 RX ADMIN — ROPIVACAINE HYDROCHLORIDE 30 ML: 5 INJECTION EPIDURAL; INFILTRATION; PERINEURAL at 11:12

## 2024-08-07 RX ADMIN — ACETAMINOPHEN 1000 MG: 500 TABLET ORAL at 10:31

## 2024-08-07 RX ADMIN — SODIUM CHLORIDE 3000 MG: 900 INJECTION INTRAVENOUS at 21:12

## 2024-08-07 RX ADMIN — CETIRIZINE HYDROCHLORIDE 10 MG: 10 TABLET, FILM COATED ORAL at 20:54

## 2024-08-07 RX ADMIN — PROPOFOL 200 MG: 10 INJECTION, EMULSION INTRAVENOUS at 11:38

## 2024-08-07 RX ADMIN — FENTANYL CITRATE 50 MCG: 50 INJECTION INTRAMUSCULAR; INTRAVENOUS at 12:23

## 2024-08-07 RX ADMIN — HYDRALAZINE HYDROCHLORIDE 5 MG: 20 INJECTION, SOLUTION INTRAMUSCULAR; INTRAVENOUS at 14:25

## 2024-08-07 RX ADMIN — OXYCODONE HYDROCHLORIDE 10 MG: 10 TABLET ORAL at 19:22

## 2024-08-07 RX ADMIN — BUDESONIDE AND FORMOTEROL FUMARATE DIHYDRATE 2 PUFF: 160; 4.5 AEROSOL RESPIRATORY (INHALATION) at 21:19

## 2024-08-07 RX ADMIN — FENTANYL CITRATE 50 MCG: 50 INJECTION INTRAMUSCULAR; INTRAVENOUS at 12:30

## 2024-08-07 RX ADMIN — ROCURONIUM BROMIDE 50 MG: 10 SOLUTION INTRAVENOUS at 11:38

## 2024-08-07 RX ADMIN — MELOXICAM 15 MG: 7.5 TABLET ORAL at 10:31

## 2024-08-07 RX ADMIN — TRANEXAMIC ACID 1950 MG: 650 TABLET ORAL at 10:31

## 2024-08-07 RX ADMIN — SODIUM CHLORIDE: 9 INJECTION, SOLUTION INTRAVENOUS at 13:06

## 2024-08-07 RX ADMIN — LIDOCAINE HYDROCHLORIDE 100 MG: 20 INJECTION, SOLUTION EPIDURAL; INFILTRATION; INTRACAUDAL; PERINEURAL at 11:38

## 2024-08-07 ASSESSMENT — PAIN DESCRIPTION - DESCRIPTORS
DESCRIPTORS: ACHING

## 2024-08-07 ASSESSMENT — PAIN SCALES - GENERAL
PAINLEVEL_OUTOF10: 8
PAINLEVEL_OUTOF10: 10
PAINLEVEL_OUTOF10: 8
PAINLEVEL_OUTOF10: 10
PAINLEVEL_OUTOF10: 5

## 2024-08-07 ASSESSMENT — PAIN DESCRIPTION - LOCATION
LOCATION: KNEE
LOCATION: HIP
LOCATION: KNEE

## 2024-08-07 ASSESSMENT — PAIN DESCRIPTION - PAIN TYPE
TYPE: SURGICAL PAIN

## 2024-08-07 ASSESSMENT — PAIN DESCRIPTION - FREQUENCY
FREQUENCY: CONTINUOUS

## 2024-08-07 ASSESSMENT — PAIN DESCRIPTION - ORIENTATION
ORIENTATION: LEFT

## 2024-08-07 ASSESSMENT — PAIN DESCRIPTION - ONSET
ONSET: ON-GOING

## 2024-08-07 ASSESSMENT — PAIN - FUNCTIONAL ASSESSMENT
PAIN_FUNCTIONAL_ASSESSMENT: PREVENTS OR INTERFERES SOME ACTIVE ACTIVITIES AND ADLS
PAIN_FUNCTIONAL_ASSESSMENT: ADULT NONVERBAL PAIN SCALE (NPVS)
PAIN_FUNCTIONAL_ASSESSMENT: PREVENTS OR INTERFERES SOME ACTIVE ACTIVITIES AND ADLS

## 2024-08-07 NOTE — ANESTHESIA PROCEDURE NOTES
Peripheral Block    Patient location during procedure: holding area  Reason for block: post-op pain management and at surgeon's request  Start time: 8/7/2024 11:13 AM  End time: 8/7/2024 11:18 AM  Staffing  Performed: anesthesiologist   Anesthesiologist: Stefan Buck MD  Performed by: Stefan Buck MD  Authorized by: Stefan Buck MD    Preanesthetic Checklist  Completed: patient identified, IV checked, site marked, risks and benefits discussed, surgical/procedural consents, equipment checked, pre-op evaluation, timeout performed, anesthesia consent given, oxygen available, monitors applied/VS acknowledged, fire risk safety assessment completed and verbalized and blood product R/B/A discussed and consented  Peripheral Block   Patient position: sitting  Prep: DuraPrep  Provider prep: mask  Patient monitoring: cardiac monitor, continuous pulse ox, continuous capnometry, frequent blood pressure checks, IV access and oxygen  Block type: Femoral  Adductor canal  Laterality: left  Injection technique: single-shot  Guidance: ultrasound guided    Needle   Needle type: insulated echogenic nerve stimulator needle   Needle gauge: 20 G  Needle localization: ultrasound guidance  Needle insertion depth: 5 cm  Needle length: 10 cm  Assessment   Injection assessment: negative aspiration for heme, no paresthesia on injection, local visualized surrounding nerve on ultrasound and no intravascular symptoms  Paresthesia pain: none  Slow fractionated injection: yes  Hemodynamics: stable  Outcomes: uncomplicated and patient tolerated procedure well    Medications Administered  ropivacaine (NAROPIN) injection 0.5% - Perineural   30 mL - 8/7/2024 11:13:00 AM

## 2024-08-07 NOTE — PROGRESS NOTES
[facility] Orthopedic Surgery   Progress Note      S/P :  SUBJECTIVE  in bed, Alert and oriented. Tearful with pain. Pain is   described in left knee and thigh and with the intensity of moderate. Pain is described as aching.       OBJECTIVE              Physical                      VITALS:  BP (!) 157/74   Pulse 68   Temp 97 °F (36.1 °C) (Temporal)   Resp 13   Ht 1.676 m (5' 6\")   Wt (!) 145.6 kg (320 lb 15.8 oz)   SpO2 97%   BMI 51.81 kg/m²                     MUSCULOSKELETAL:  left foot NVI. Wiggles toes to command. Able to plantarflex and dorsiflex ankle Pedal pulses are palpable.                    NEUROLOGIC:                                  Sensory:  Touch:  Left Lower Extremity:  normal                                                 Surgical wound appears clean and dry left knee with ACE and ice pack    Data       CBC:   Lab Results   Component Value Date/Time    WBC 15.9 12/13/2020 05:44 AM    RBC 4.74 12/13/2020 05:44 AM    HGB 13.5 12/13/2020 05:44 AM    HCT 40.4 12/13/2020 05:44 AM    MCV 85.2 12/13/2020 05:44 AM    MCH 28.5 12/13/2020 05:44 AM    MCHC 33.4 12/13/2020 05:44 AM    RDW 14.8 12/13/2020 05:44 AM     12/13/2020 05:44 AM    MPV 8.5 12/13/2020 05:44 AM        WBC:    Lab Results   Component Value Date/Time    WBC 15.9 12/13/2020 05:44 AM        Hemoglobin/Hematocrit:    Lab Results   Component Value Date/Time    HGB 13.5 12/13/2020 05:44 AM    HCT 40.4 12/13/2020 05:44 AM        PT/INR:    Lab Results   Component Value Date/Time    PROTIME 10.4 12/13/2020 05:44 AM    INR 0.90 12/13/2020 05:44 AM              Current Inpatient Medications             Current Facility-Administered Medications: albuterol sulfate HFA (PROVENTIL;VENTOLIN;PROAIR) 108 (90 Base) MCG/ACT inhaler 2 puff, 2 puff, Inhalation, Q6H PRN  Cetirizine HCl CAPS 1 tablet, 1 tablet, Oral, Nightly  [START ON 8/8/2024] hydroCHLOROthiazide (HYDRODIURIL) tablet 25 mg, 25 mg, Oral, Daily  [START ON 8/8/2024] losartan (COZAAR)

## 2024-08-07 NOTE — H&P
Update History & Physical    The patient's History and Physical of July 10, 2024 was reviewed with the patient and I examined the patient. There was no change. The surgical site was confirmed by the patient and me.       Plan: The risks, benefits, expected outcome, and alternative to the recommended procedure have been discussed with the patient. Patient understands and wants to proceed with the procedure.     Electronically signed by BERTHA LUGO MD on 8/7/2024 at 11:21 AM

## 2024-08-07 NOTE — PLAN OF CARE
Problem: Discharge Planning  Goal: Discharge to home or other facility with appropriate resources  Outcome: Progressing     Problem: Chronic Conditions and Co-morbidities  Goal: Patient's chronic conditions and co-morbidity symptoms are monitored and maintained or improved  Outcome: Progressing     Problem: Neurosensory - Adult  Goal: Achieves stable or improved neurological status  Outcome: Progressing     Problem: Respiratory - Adult  Goal: Achieves optimal ventilation and oxygenation  Outcome: Progressing  Flowsheets (Taken 8/7/2024 1528)  Achieves optimal ventilation and oxygenation: Assess for changes in respiratory status     Problem: Skin/Tissue Integrity - Adult  Goal: Incisions, wounds, or drain sites healing without S/S of infection  Outcome: Progressing     Problem: Musculoskeletal - Adult  Goal: Return mobility to safest level of function  Outcome: Progressing  Goal: Maintain proper alignment of affected body part  Outcome: Progressing  Goal: Return ADL status to a safe level of function  Outcome: Progressing     Problem: Infection - Adult  Goal: Absence of infection at discharge  Outcome: Progressing  Goal: Absence of infection during hospitalization  Outcome: Progressing     Problem: Metabolic/Fluid and Electrolytes - Adult  Goal: Glucose maintained within prescribed range  Outcome: Progressing     Problem: Safety - Adult  Goal: Free from fall injury  Outcome: Progressing     Problem: Pain  Goal: Verbalizes/displays adequate comfort level or baseline comfort level  Outcome: Progressing

## 2024-08-07 NOTE — ANESTHESIA POSTPROCEDURE EVALUATION
[facility] Department of Anesthesiology  Post-Anesthesia Note       Name:  Olivia Garcia                                  Age:  54 y.o.  MRN:  [id number]     Last Vitals & Oxygen Saturation: BP (!) 157/74   Pulse 68   Temp 97 °F (36.1 °C) (Temporal)   Resp 13   Ht 1.676 m (5' 6\")   Wt (!) 145.6 kg (320 lb 15.8 oz)   SpO2 97%   BMI 51.81 kg/m²   Patient Vitals for the past 4 hrs:   BP Temp Temp src Pulse Resp SpO2   08/07/24 1434 (!) 157/74 -- -- 68 13 97 %   08/07/24 1430 (!) 166/74 -- -- 68 14 97 %   08/07/24 1424 -- -- -- 68 11 98 %   08/07/24 1421 (!) 172/85 -- -- 67 14 97 %   08/07/24 1420 -- -- -- 65 14 97 %   08/07/24 1415 (!) 170/72 -- -- 66 14 97 %   08/07/24 1410 -- -- -- 69 13 98 %   08/07/24 1405 -- -- -- 72 12 98 %   08/07/24 1400 (!) 177/81 -- -- 73 13 98 %   08/07/24 1357 -- -- -- 76 14 (!) 87 %   08/07/24 1356 -- -- -- 75 14 90 %   08/07/24 1354 -- -- -- 77 14 94 %   08/07/24 1353 -- -- -- 75 17 99 %   08/07/24 1345 (!) 173/77 -- -- 72 12 99 %   08/07/24 1342 -- -- -- 74 14 99 %   08/07/24 1337 -- -- -- 73 16 98 %   08/07/24 1330 (!) 164/70 -- -- 71 15 100 %   08/07/24 1325 (!) 165/76 -- -- 73 18 97 %   08/07/24 1320 (!) 155/74 -- -- 71 13 96 %   08/07/24 1315 (!) 152/80 -- -- 73 19 97 %   08/07/24 1311 (!) 152/74 -- -- 75 12 93 %   08/07/24 1308 (!) 152/80 97 °F (36.1 °C) Temporal 74 11 98 %   08/07/24 1115 (!) 151/70 -- -- 73 20 99 %   08/07/24 1110 (!) 155/73 -- -- 72 18 99 %   08/07/24 1105 (!) 144/77 -- -- 68 12 100 %   08/07/24 1100 (!) 150/70 -- -- 69 12 98 %       Level of consciousness:  Awake, alert    Respiratory: Respirations easy, no distress. Stable.    Cardiovascular: Hemodynamically stable.    Hydration: Adequate.    PONV: Adequately managed.    Post-op pain: Improved    Post-op assessment: Tolerated anesthetic well without complication.    Complications:  None.  Nerve block intact.  Moving foot.  No issues.    Stefan Buck MD  August 7, 2024   2:50 PM

## 2024-08-07 NOTE — PROGRESS NOTES
Pt arrived to PACU from OR. Pt asleep on 2l/nc. Left leg dressing C/D/I. +pulses noted. Ice pack applied.

## 2024-08-07 NOTE — PROGRESS NOTES
Met with patient and adult children at bedside, patient is alert and oriented x4. Discussed role of nurse navigator.  Reviewed reasons to call with questions or concerns, importance of TEDS, Incentive spirometer and incentive spirometer at bedside, pain medication, and physical and occupational therapy. 2/4 bed rails up, bed locked and in lowest position, fall precautions in place, call light within reach.     Pulses present bilaterally +2 Moderate pedal, Left Knee Ace wrap assessed and is clean,dry, and intact, no signs of redness, drainage, or odor noted Ice in place. Xavier and scds on right lower extremity. moderate dorsi and plantar flexions noted bilaterally, toes appear appropriate to ethnicity in color , Warm to touch bilaterally. patient denies numbness or tingling in extremities.    Recent Labs     08/07/24  1310   POCGLU 133*     Per Insulin Protocol, recheck next POC glucose at 9pm as patient has not yet triggered the insulin protocol    Vitals:    08/07/24 1357 08/07/24 1400 08/07/24 1405 08/07/24 1410   BP:  (!) 177/81     Pulse: 76 73 72 69   Resp: 14 13 12 13   Temp:       TempSrc:       SpO2: (!) 87% 98% 98% 98%   Weight:       Height:           DC Plan: Home with adult children and home health care. Son Juan Alberto to transport patient.  DME needs:needs rolling walker, agreeable to the hospital equipment representative and Yohana with [employer] informed. She already obtained a shower chair and plans to purchase a raised toilet seat elsewhere.       Idalia Lopez  Orthopedic Nurse Navigator  Phone number: [phone]    Future Appointments   Date Time Provider Department Center   8/22/2024 10:30 AM Thierno Gastelum MD W [facility]TH[facility]   8/29/2024  4:00 PM Jeffrey Alexandra MD PULM & [facility]

## 2024-08-07 NOTE — PROGRESS NOTES
Patient arrived to room 3118 from Pacu. Patient is A/Ox4. Oriented patient to room and call light. Fall assessment completed, patient denies any fall history. Pt stated that she was in a 10/10 pain Prn pain medication given.  Instructed patient to call for help out of bed, patient verbalized understanding. Patient denies any further needs at this time, will continue to monitor and assess for needs and comfort. Electronically signed by MARLENY MAGANA RN on 8/7/2024 at 2:57 PM

## 2024-08-07 NOTE — CARE COORDINATION
08/07/24 1556   Service Assessment   Patient Orientation Alert and Oriented;Person;Place;Situation   Cognition Alert   History Provided By Patient   Primary Caregiver Self   Accompanied By/Relationship no one   Support Systems Children   Patient's Healthcare Decision Maker is: Legal Next of Kin   PCP Verified by CM Yes   Last Visit to PCP Within last 3 months   Prior Functional Level Independent in ADLs/IADLs   Current Functional Level Assistance with the following:;Mobility;Bathing;Dressing;Toileting;Cooking;Housework;Shopping   Can patient return to prior living arrangement Yes   Ability to make needs known: Good   Family able to assist with home care needs: Yes   Would you like for me to discuss the discharge plan with any other family members/significant others, and if so, who? No   Financial Resources Other (Comment)  (UMR)   Community Resources None   CM/SW Referral Other (see comment)  (DC plans)   Discharge Planning   Type of Residence House   Living Arrangements Alone   Current Services Prior To Admission Durable Medical Equipment   Current DME Prior to Arrival Other (Comment)  (RTS)   Potential Assistance Needed Durable Medical Equipment;Home Care   Potential DME Needed Walker   DME Ordered? Walker   Potential Assistance Purchasing Medications No   Type of Home Care Services None   Patient expects to be discharged to: House   Services At/After Discharge   [place] Resource Information Provided? No   Mode of Transport at Discharge Other (see comment)  (car)   Condition of Participation: Discharge Planning   The Plan for Transition of Care is related to the following treatment goals: home care   The Patient and/or Patient Representative was provided with a Choice of Provider? Patient   The Patient and/Or Patient Representative agree with the Discharge Plan? Yes   Freedom of Choice list was provided with basic dialogue that supports the patient's individualized plan of care/goals, treatment preferences, and  No  Plans to Return to Present Housing: (P) Yes  Other Identified Issues/Barriers to RETURNING to current housing: none  Potential Assistance needed at discharge: (P) Durable Medical Equipment, Home Care            Potential DME: (P) Walker  Patient expects to discharge to: (P) House  Plan for transportation at discharge:  car    Financial    Payor: UMR / Plan: UMR / Product Type: *No Product type* /     Does insurance require precert for SNF: n/a    Potential assistance Purchasing Medications: (P) No  Meds-to-Beds request: Yes      [facility] [place], [place] - [address] - P [phone] - F [phone]  [address]  [facility] [address]  Phone: [phone] Fax: [phone]    [id number] [place], [place] - [address] - P [phone] - F [phone]  [address]  [facility] [address]  Phone: [phone] Fax: [phone]    [place], [place] - [address] - P [phone] - F [phone]  [address] [facility] [address]  Phone: [phone] Fax: [phone]      Notes:    Factors facilitating achievement of predicted outcomes: Family support, Motivated, Cooperative, and Pleasant    Barriers to discharge: none    Additional Case Management Notes:    spoke with patient and confirmed JET Class plan to return home with her children and the support of [employer] .     made referral to Luiza hernandez/ [employer] #[phone]. She will follow and pull orders from [email or web address].        The Plan for Transition of Care is related to the following treatment goals of Degenerative arthritis of left knee [M17.12]  Arthritis of left knee [M17.12]    The Patient and/or patient representative Olivia and her family were provided with a choice of provider and agrees with the discharge plan. Freedom of choice list with basic dialogue that supports the patient's individualized plan of care/goals and shares the quality data associated with the providers

## 2024-08-07 NOTE — OP NOTE
Patient: Olivia Garcia  YOB: 1970  MRN: [id number]    DATE OF PROCEDURE: 8/7/2024      PREOPERATIVE DIAGNOSIS:  Tricompartmental degenerative osteoarthritis of the left knee.     POSTOPERATIVE DIAGNOSIS:  Tricompartmental degenerative osteoarthritis of the left knee.     OPERATION PERFORMED:  Robotic-assisted left total knee arthroplasty.     SURGEON:  Thierno Gastelum MD     ASSISTANT:  FABIO Saldana    EBL: 200 mL     IMPLANTS:  [employer] Triathlon CR cementless femur size 4  [employer] Triathlon cementless tibia size 4  10mm CS polyethylene  32mm cementless asymmetric patella     INDICATIONS:  The patient is a 54 y.o. female who presents for left knee replacement.  The patient had been treated conservatively with anti-inflammatories, physical therapy, and intra-articular cortisone injections; these treatments were no longer providing significant relief. We discussed total knee arthroplasty. The operative procedure, alternatives, and risks were discussed in detail with the patient.  The risks include but are not limited to: Infection, vessel injury, nerve injury, DVT, pulmonary embolism, implant loosening, need for revision surgery, loss of motion, continued pain. Informed consent for surgery was signed by the patient.     OPERATIVE PROCEDURE:  The patient was seen in the preoperative holding area where the site of surgery was marked and informed consent was confirmed. The patient was brought back to the operating room by OR personnel. Anesthesia was administered. The patient was positioned supine on the operating table. The left lower extremity was then prepped and draped in a standard and sterile fashion. A final and formal timeout was then performed which confirmed the correct patient, correct position, and correct site of surgery. IV antibiotics were administered within 1 hour of the skin incision.     A tourniquet was not used. An anterior midline incision was made over the knee. Skin and  arthroplasty, and in wound closure.    Due to the patient's morbid obesity (BMI 52), additional time was needed for patient positioning, performance of the arthroplasty, and wound closure.    BERTHA LUGO MD  8/7/2024

## 2024-08-07 NOTE — ANESTHESIA PRE PROCEDURE
[facility] Department of Anesthesiology  Pre-Anesthesia Evaluation/Consultation       Name:  Olivia Garcia  : 1970  Age:  54 y.o.                                           MRN:  [id number]  Date: 2024           Surgeon: Surgeon(s):  Thierno Gastelum MD    Procedure: Procedure(s):  LEFT TOTAL KNEE REPLACEMENT ROBOTIC ASSISTED     Allergies   Allergen Reactions    Penicillins Hives and Rash     Patient Active Problem List   Diagnosis    Asthma    Shortness of breath    Osteoarthritis, knee    Osteoarthritis of left knee    Morbid obesity with BMI of 45.0-49.9, adult (HCC)    Sprain of anterior talofibular ligament of right ankle    Pneumonia due to COVID-19 virus    Degenerative arthritis of left knee     Past Medical History:   Diagnosis Date    Arthritis     Asthma     COVID     with plasma transfusion    Hypertension      Past Surgical History:   Procedure Laterality Date    CARPAL TUNNEL RELEASE      2 ON EACH SIDE    CHOLECYSTECTOMY      COLONOSCOPY N/A 2022    COLONOSCOPY performed by Ignacio Greene MD at [facility] ENDOSCOPY    HERNIA REPAIR      X2 umbilical    HYSTERECTOMY (CERVIX STATUS UNKNOWN)      KNEE ARTHROSCOPY Bilateral      Social History     Tobacco Use    Smoking status: Never     Passive exposure: Past    Smokeless tobacco: Never   Vaping Use    Vaping Use: Never used   Substance Use Topics    Alcohol use: Yes     Comment: rarely    Drug use: Never     Medications  No current facility-administered medications on file prior to encounter.     Current Outpatient Medications on File Prior to Encounter   Medication Sig Dispense Refill    Multiple Vitamins-Minerals (THERAPEUTIC MULTIVITAMIN-MINERALS) tablet Take 1 tablet by mouth daily      ipratropium 0.5 mg-albuterol 2.5 mg (DUONEB) 0.5-2.5 (3) MG/3ML SOLN nebulizer solution 3 MLS BY NEBULIZATION ROUTE 2 (TWO) TIMES DAILY FOR 90 DAYS.      Cholecalciferol (VITAMIN D) 50 MCG (2000 UT) CAPS capsule Take 1 capsule by mouth daily

## 2024-08-08 VITALS
WEIGHT: 293 LBS | HEIGHT: 66 IN | TEMPERATURE: 97.5 F | HEART RATE: 75 BPM | DIASTOLIC BLOOD PRESSURE: 78 MMHG | OXYGEN SATURATION: 93 % | SYSTOLIC BLOOD PRESSURE: 145 MMHG | BODY MASS INDEX: 47.09 KG/M2 | RESPIRATION RATE: 16 BRPM

## 2024-08-08 LAB
ANION GAP SERPL CALCULATED.3IONS-SCNC: 12 MMOL/L (ref 3–16)
BUN SERPL-MCNC: 15 MG/DL (ref 7–20)
CALCIUM SERPL-MCNC: 8.4 MG/DL (ref 8.3–10.6)
CHLORIDE SERPL-SCNC: 98 MMOL/L (ref 99–110)
CO2 SERPL-SCNC: 26 MMOL/L (ref 21–32)
CREAT SERPL-MCNC: 0.7 MG/DL (ref 0.6–1.1)
GFR SERPLBLD CREATININE-BSD FMLA CKD-EPI: >90 ML/MIN/{1.73_M2}
GLUCOSE BLD-MCNC: 138 MG/DL (ref 70–99)
GLUCOSE SERPL-MCNC: 144 MG/DL (ref 70–99)
PERFORMED ON: ABNORMAL
POTASSIUM SERPL-SCNC: 4.2 MMOL/L (ref 3.5–5.1)
SODIUM SERPL-SCNC: 136 MMOL/L (ref 136–145)

## 2024-08-08 PROCEDURE — 6370000000 HC RX 637 (ALT 250 FOR IP): Performed by: ORTHOPAEDIC SURGERY

## 2024-08-08 PROCEDURE — 96374 THER/PROPH/DIAG INJ IV PUSH: CPT

## 2024-08-08 PROCEDURE — 97530 THERAPEUTIC ACTIVITIES: CPT

## 2024-08-08 PROCEDURE — 97110 THERAPEUTIC EXERCISES: CPT

## 2024-08-08 PROCEDURE — 94640 AIRWAY INHALATION TREATMENT: CPT

## 2024-08-08 PROCEDURE — 2580000003 HC RX 258: Performed by: ORTHOPAEDIC SURGERY

## 2024-08-08 PROCEDURE — G0378 HOSPITAL OBSERVATION PER HR: HCPCS

## 2024-08-08 PROCEDURE — 6370000000 HC RX 637 (ALT 250 FOR IP): Performed by: NURSE PRACTITIONER

## 2024-08-08 PROCEDURE — 97116 GAIT TRAINING THERAPY: CPT

## 2024-08-08 PROCEDURE — 94760 N-INVAS EAR/PLS OXIMETRY 1: CPT

## 2024-08-08 PROCEDURE — 36415 COLL VENOUS BLD VENIPUNCTURE: CPT

## 2024-08-08 PROCEDURE — 6360000002 HC RX W HCPCS: Performed by: ORTHOPAEDIC SURGERY

## 2024-08-08 PROCEDURE — 97161 PT EVAL LOW COMPLEX 20 MIN: CPT

## 2024-08-08 PROCEDURE — 97166 OT EVAL MOD COMPLEX 45 MIN: CPT

## 2024-08-08 PROCEDURE — 80048 BASIC METABOLIC PNL TOTAL CA: CPT

## 2024-08-08 RX ADMIN — METHOCARBAMOL TABLETS 750 MG: 750 TABLET, COATED ORAL at 00:15

## 2024-08-08 RX ADMIN — ACETAMINOPHEN 325MG 650 MG: 325 TABLET ORAL at 04:41

## 2024-08-08 RX ADMIN — DULOXETINE HYDROCHLORIDE 60 MG: 60 CAPSULE, DELAYED RELEASE ORAL at 08:38

## 2024-08-08 RX ADMIN — SODIUM CHLORIDE 3000 MG: 900 INJECTION INTRAVENOUS at 04:44

## 2024-08-08 RX ADMIN — BUDESONIDE AND FORMOTEROL FUMARATE DIHYDRATE 2 PUFF: 160; 4.5 AEROSOL RESPIRATORY (INHALATION) at 07:37

## 2024-08-08 RX ADMIN — METHOCARBAMOL TABLETS 750 MG: 750 TABLET, COATED ORAL at 08:38

## 2024-08-08 RX ADMIN — HYDROCHLOROTHIAZIDE 25 MG: 25 TABLET ORAL at 08:38

## 2024-08-08 RX ADMIN — APIXABAN 2.5 MG: 2.5 TABLET, FILM COATED ORAL at 08:38

## 2024-08-08 RX ADMIN — OXYCODONE HYDROCHLORIDE 10 MG: 10 TABLET ORAL at 04:41

## 2024-08-08 RX ADMIN — KETOROLAC TROMETHAMINE 15 MG: 15 INJECTION, SOLUTION INTRAMUSCULAR; INTRAVENOUS at 11:17

## 2024-08-08 RX ADMIN — OXYCODONE HYDROCHLORIDE 10 MG: 10 TABLET ORAL at 00:03

## 2024-08-08 RX ADMIN — LOSARTAN POTASSIUM 25 MG: 25 TABLET, FILM COATED ORAL at 08:38

## 2024-08-08 RX ADMIN — SODIUM CHLORIDE, PRESERVATIVE FREE 10 ML: 5 INJECTION INTRAVENOUS at 08:44

## 2024-08-08 RX ADMIN — OXYCODONE HYDROCHLORIDE 10 MG: 10 TABLET ORAL at 08:40

## 2024-08-08 RX ADMIN — ACETAMINOPHEN 325MG 650 MG: 325 TABLET ORAL at 08:38

## 2024-08-08 ASSESSMENT — PAIN SCALES - GENERAL
PAINLEVEL_OUTOF10: 7
PAINLEVEL_OUTOF10: 8
PAINLEVEL_OUTOF10: 7
PAINLEVEL_OUTOF10: 0
PAINLEVEL_OUTOF10: 8
PAINLEVEL_OUTOF10: 0
PAINLEVEL_OUTOF10: 4

## 2024-08-08 ASSESSMENT — PAIN DESCRIPTION - PAIN TYPE
TYPE: SURGICAL PAIN

## 2024-08-08 ASSESSMENT — PAIN DESCRIPTION - FREQUENCY
FREQUENCY: CONTINUOUS

## 2024-08-08 ASSESSMENT — PAIN DESCRIPTION - DESCRIPTORS
DESCRIPTORS: ACHING
DESCRIPTORS: THROBBING

## 2024-08-08 ASSESSMENT — PAIN DESCRIPTION - ONSET
ONSET: ON-GOING

## 2024-08-08 ASSESSMENT — PAIN - FUNCTIONAL ASSESSMENT
PAIN_FUNCTIONAL_ASSESSMENT: PREVENTS OR INTERFERES SOME ACTIVE ACTIVITIES AND ADLS

## 2024-08-08 ASSESSMENT — PAIN DESCRIPTION - LOCATION
LOCATION: KNEE
LOCATION: LEG
LOCATION: KNEE
LOCATION: KNEE

## 2024-08-08 ASSESSMENT — PAIN DESCRIPTION - ORIENTATION
ORIENTATION: LEFT

## 2024-08-08 NOTE — PROGRESS NOTES
Pt is alert and oriented x4, resting quietly in bed. Nightly medications and intake tolerated well. IVF D/C. No complaints of nausea or vomiting at this time. Pt having some pain - PRN pain medications given as ordered. IS used per order. Dressing CDI. No other needs made known at this time. Fall precautions in place. Call light within reach. Will continue to monitor.    Electronically signed by Victorino Chew RN on 8/8/2024 at 6:04 AM     cigarettes/quit 25 years ago

## 2024-08-08 NOTE — PLAN OF CARE
Problem: Discharge Planning  Goal: Discharge to home or other facility with appropriate resources  8/8/2024 0858 by Ozzy Guerin RN  Outcome: Progressing  8/8/2024 0606 by Victorino Chew RN  Outcome: Progressing  Flowsheets (Taken 8/8/2024 0606)  Discharge to home or other facility with appropriate resources:   Identify barriers to discharge with patient and caregiver   Arrange for needed discharge resources and transportation as appropriate     Problem: Chronic Conditions and Co-morbidities  Goal: Patient's chronic conditions and co-morbidity symptoms are monitored and maintained or improved  8/8/2024 0858 by Ozzy Guerin RN  Outcome: Progressing  8/8/2024 0606 by Victorino Chew RN  Outcome: Progressing  Flowsheets (Taken 8/8/2024 0606)  Care Plan - Patient's Chronic Conditions and Co-Morbidity Symptoms are Monitored and Maintained or Improved:   Monitor and assess patient's chronic conditions and comorbid symptoms for stability, deterioration, or improvement   Collaborate with multidisciplinary team to address chronic and comorbid conditions and prevent exacerbation or deterioration     Problem: Neurosensory - Adult  Goal: Achieves stable or improved neurological status  8/8/2024 0858 by Ozzy Guerin RN  Outcome: Progressing  8/8/2024 0606 by Victorino Chew RN  Outcome: Progressing  Flowsheets (Taken 8/8/2024 0606)  Achieves stable or improved neurological status:   Assess for and report changes in neurological status   Initiate measures to prevent increased intracranial pressure   Maintain blood pressure and fluid volume within ordered parameters to optimize cerebral perfusion and minimize risk of hemorrhage     Problem: Respiratory - Adult  Goal: Achieves optimal ventilation and oxygenation  8/8/2024 0858 by Ozzy Guerin RN  Outcome: Progressing  8/8/2024 0606 by Victorino Chew RN  Outcome: Progressing  Flowsheets (Taken 8/8/2024 0606)  Achieves optimal ventilation and oxygenation:   Assess for  changes in respiratory status   Assess for changes in mentation and behavior   Position to facilitate oxygenation and minimize respiratory effort     Problem: Skin/Tissue Integrity - Adult  Goal: Incisions, wounds, or drain sites healing without S/S of infection  8/8/2024 0858 by Ozzy Guerin RN  Outcome: Progressing  8/8/2024 0606 by Victorino Chew RN  Outcome: Progressing  Flowsheets (Taken 8/8/2024 0606)  Incisions, Wounds, or Drain Sites Healing Without Sign and Symptoms of Infection:   ADMISSION and DAILY: Assess and document risk factors for pressure ulcer development   TWICE DAILY: Assess and document skin integrity     Problem: Musculoskeletal - Adult  Goal: Return mobility to safest level of function  8/8/2024 0858 by Ozzy Guerin RN  Outcome: Progressing  8/8/2024 0606 by Victorino Chew RN  Outcome: Progressing  Flowsheets (Taken 8/8/2024 0606)  Return Mobility to Safest Level of Function:   Assess patient stability and activity tolerance for standing, transferring and ambulating with or without assistive devices   Assist with transfers and ambulation using safe patient handling equipment as needed   Ensure adequate protection for wounds/incisions during mobilization  Goal: Maintain proper alignment of affected body part  8/8/2024 0858 by Ozzy Guerin RN  Outcome: Progressing  8/8/2024 0606 by Victorino Chew RN  Outcome: Progressing  Goal: Return ADL status to a safe level of function  8/8/2024 0858 by Ozzy Guerin RN  Outcome: Progressing  8/8/2024 0606 by Victorino Chew RN  Outcome: Progressing     Problem: Infection - Adult  Goal: Absence of infection at discharge  8/8/2024 0858 by Ozzy Guerin RN  Outcome: Progressing  8/8/2024 0606 by Victorino Chew RN  Outcome: Progressing  Flowsheets (Taken 8/8/2024 0606)  Absence of infection at discharge:   Assess and monitor for signs and symptoms of infection   Monitor lab/diagnostic results   Monitor all insertion sites i.e., indwelling lines,

## 2024-08-08 NOTE — PROGRESS NOTES
Clinical Pharmacy Note  Medication Counseling    Reviewed new medications started during hospital admission: Apixaban, Duloxetine. Indications and side effects were emphasized during counseling.  All medication-related questions addressed.  Patient verbalized understanding of education.    Should the patient express any additional questions or concerns regarding their medications, please do not hesitate to contact the pharmacy department.    Patient/caregiver aware they may refuse medications during hospital stay.       5 minutes spent educating patient regarding medications.  Srinivasa Miranda KAT, 8/8/2024 11:19 AM

## 2024-08-08 NOTE — PROGRESS NOTES
Huddle performed including Nurse navigator Idalia, Physical therapist Becki, and Occupational therapist Samira. Discussed plan of care, discharge plan, and dme needs if applicable for orthopedic total joint patient.

## 2024-08-08 NOTE — PROGRESS NOTES
CLINICAL PHARMACY NOTE: MEDS TO BEDS    Total # of Prescriptions Filled: 2   The following medications were delivered to the patient:  Current Discharge Medication List        START taking these medications    Details   apixaban (ELIQUIS) 2.5 MG TABS tablet Take 1 tablet by mouth 2 times daily for 14 days  Qty: 28 tablet, Refills: 0      DULoxetine (CYMBALTA) 60 MG extended release capsule Take 1 capsule by mouth daily for 14 days  Qty: 14 capsule, Refills: 0      oxyCODONE HCl (OXY-IR) 10 MG immediate release tablet Take 1 tablet by mouth every 4 hours as needed for Pain for up to 5 days. DO NOT FILL. See change in frequency only Max Daily Amount: 60 mg  Qty: 1 tablet, Refills: 0    Comments: Reduce doses taken as pain becomes manageable Reduce doses taken as pain becomes manageable  Associated Diagnoses: Arthritis of left knee               Additional Documentation:  Eliquis and Duloxetine delivered at bedside.  Oxycodone printed script

## 2024-08-08 NOTE — PROGRESS NOTES
Orders completed this shift:      [x] Surgical site and Neurovascular checks assessed with head to toe assessment and Q4Hr this shift per orders. See flowsheets for documentation.   [x] Insulin protocol implemented per orders, see eMAR for documentation.  [x] Patient ambulated 20 feet from bed to toilet and back to bed via stedy. See flowsheet for documentation on how patient tolerated ambulation.  [x] Ice pack/pad in place to surgical site. Barrier in place between patient skin and ice pack/pad.   [x]Pain medication administered per orders for management of pain. See eMAR for documentation.   [x] Incentive spirometer performed by patient. Volume achieved 2000. Encouraged patient to perform Q2hr while awake per order.  [x] IV fluids stopped per orders as patient is tolerating PO and has adequate urine output. See eMAR for documentation.   [x] Shift intake and output documented per shift, see flowsheet.  [x] Customized care plan and education charted on Qshift.    Electronically signed by Victorino Chew RN on 8/8/2024 at 6:06 AM

## 2024-08-08 NOTE — PROGRESS NOTES
Xavier manley improper fit per Samira OT, Beth Hernandez NP notified and okay to discontinue, patient informed.

## 2024-08-08 NOTE — PROGRESS NOTES
Patient up in bed and aaox4. Patient reporting 8/10 pain in L thigh. Pain being managed with tylenol and oxci. Head to toe assessment completed with change documented. Incision on L knee remains clean, dry and intact. SCDS on. Ice packs in place on LLE. Fall precautions in place.  Electronically signed by FREDERICK BUTTS RN on 8/8/2024 at 9:03 AM

## 2024-08-08 NOTE — PROGRESS NOTES
Occupational Therapy  Facility/Department: [address] ORTHOPEDICS  Occupational Therapy Initial Assessment    Name: Olivia Garcia  : 1970  MRN: [id number]  Date of Service: 2024    Discharge Recommendations:  2-3 sessions per week, Patient would benefit from continued therapy after discharge, Home with Home health OT, 24 hour supervision or assist     Olivia Garcia scored a 17/ on the AM-PAC ADL Inpatient form. Current research shows that an AM-PAC score of 18 or greater is typically associated with a discharge to the patient's home setting. Based on the patient's AM-PAC score, and their current ADL deficits, it is recommended that the patient have 2-3 sessions per week of Occupational Therapy at d/c to increase the patient's independence.  At this time, this patient demonstrates the endurance and safety to discharge home with home OT (home vs OP services) and a follow up treatment frequency of 2-3x/wk.   Please see assessment section for further patient specific details.    If patient discharges prior to next session this note will serve as a discharge summary.  Please see below for the latest assessment towards goals.       Patient Diagnosis(es): The encounter diagnosis was Arthritis of left knee.  Past Medical History:  has a past medical history of Arthritis, Asthma, COVID, and Hypertension.  Past Surgical History:  has a past surgical history that includes Hysterectomy; hernia repair; Carpal tunnel release; Knee arthroscopy (Bilateral); Colonoscopy (N/A, 2022); Cholecystectomy; and Total knee arthroplasty (Left, 2024).    Treatment Diagnosis: decreased fxl mobility/ADLs    Assessment   Performance deficits / Impairments: Decreased functional mobility ;Decreased strength;Decreased endurance;Decreased ADL status;Decreased high-level IADLs;Decreased balance  Assessment: Pt is a 54 y.o female admitted s/p L TKA on 24. WBAT. PTA, pt living at home alone where she is typically independent  footie sock seated EOB; totalA to doff bilateral ulices hose; pt declines wearing underwear at this time  Functional Mobility: Contact guard assistance  Functional Mobility Skilled Clinical Factors: CGA ambulating ~15 ft with use of RW; slow, steady gait; effortful and painful, however no LOB noted  Additional Comments: Anticipate pt to require up to mod/maxA LB bathing/dressing, Judie toileting, set up seated UB bathing and feeding based on strength, balance, endurance and ROM observed this date.        Bed mobility  Supine to Sit: Stand by assistance (use of gait belt to assist with LLE; HOB elevated, use of bedrail)  Sit to Supine: Unable to assess (pt seated in recliner at end of session)  Scooting: Stand by assistance  Transfers  Sit to stand: Contact guard assistance  Stand to sit: Contact guard assistance  Transfer Comments: to/from RW; EOB/recliner; VCs for safe hand placement  Vision  Vision: Within Functional Limits  Hearing  Hearing: Within functional limits  Cognition  Overall Cognitive Status: WFL  Orientation  Overall Orientation Status: Within Functional Limits               Static Sitting Balance Exercises: SBA seated EOB in prep for fxl tx  Static Standing Balance Exercises: CGA with RW in prep for fxl mobility  Dynamic Standing Balance Exercises: CGA during fxl mobility with RW  Education Given To: Patient  Education Provided: Role of Therapy;Plan of Care;ADL Adaptive Strategies;Transfer Training;Mobility Training;Fall Prevention Strategies;Home Exercise Program  Education Provided Comments: WBAT LLE; importance of ice/LE elevation; hourly mobility; LB dressing technique; completing seated bathing/dressing  Education Method: Verbal;Demonstration  Barriers to Learning: None  Education Outcome: Verbalized understanding;Demonstrated understanding                    AM-PAC - ADL  AM-PAC Daily Activity - Inpatient   How much help is needed for putting on and taking off regular lower body clothing?: A

## 2024-08-08 NOTE — PROGRESS NOTES
Physical Therapy  Facility/Department: [address] ORTHOPEDICS  Daily Treatment / Discharge      Name: Olivia Garcia  : 1970  MRN: [id number]  Date of Service: 2024    Discharge Recommendations:  Patient would benefit from continued therapy after discharge (2-3)   Olivia Garcia scored a 21/24 on the AM-PAC short mobility form. Current research shows that an AM-PAC score of 18 or greater is typically associated with a discharge to the patient's home setting. Based on the patient's AM-PAC score and their current functional mobility deficits, it is recommended that the patient have 2-3 sessions per week of HOME Physical Therapy at d/c to increase the patient's independence.      PT Equipment Recommendations  Equipment Needed: Yes  Mobility Devices: Walker  Walker: Rolling  Other: heavy duty      Patient Diagnosis(es): The encounter diagnosis was Arthritis of left knee.  Past Medical History:  has a past medical history of Arthritis, Asthma, COVID, and Hypertension.  Past Surgical History:  has a past surgical history that includes Hysterectomy; hernia repair; Carpal tunnel release; Knee arthroscopy (Bilateral); Colonoscopy (N/A, 2022); Cholecystectomy; and Total knee arthroplasty (Left, 2024).    Assessment   Body Structures, Functions, Activity Limitations Requiring Skilled Therapeutic Intervention: Decreased functional mobility ;Decreased ROM;Increased pain  Assessment: Prior to elective L TKR 24 via Dr. Gastelum, in setting of OA L Knee as well, charlotte reports living alone and independent in ADLs, transfers, and gait without device, and working.  Status 24:  Transfers SBA. Gait with HD wide wh walker 20', 40', 5' x 2 with SBA. Performs stairs as needed for home with CGA-SBA, and initial cues.  Written TKR HEP provided and reviewed. Son present and supportive. Patient appears appropriate for DC to home with initial 24 hour support and home therapy. She requires a HD RW to promote safe and

## 2024-08-08 NOTE — PROGRESS NOTES
Dry dressing and ACE wrap removed without complication. Prineo remain CDI. IAN hose and SCDs applied. Ice packs refilled.     Electronically signed by Victorino Chew RN on 8/8/2024 at 6:05 AM

## 2024-08-08 NOTE — PROGRESS NOTES
Data- Discharge order received, patient verbalized agreement to discharge, disposition to previous residence, needs noted for Home Health Care and DME and informed Minerva Hernandez NP.     Action- discharge instructions prepared and a hardcopy of AVS instructions given to patient, patient verbalized understanding. Medication information packet given related to NEW and/or CHANGED prescriptions emphasizing name/purpose/side effects, pt verbalized understanding. Discharge instruction summary: Diet- General, Activity- Full weight bearing, Primary Care Physician as follows: Vandana Leon [phone]. Follow up appointment with orthopedic office noted below, immunizations reviewed and discussed with patient, prescription medications to be filled by retail pharmacy and then delivered. Inpatient surgical procedure precautions reviewed: . Educated patient on using incentive spirometer post-discharge per surgeon's instructions. Neurovascular checks performed and moderate dorsi and plantar flexions noted bilaterally, toes appear appropriate to ethnicity in color, Warm to touch, patient denies numbness or tingling in extremities.Left Knee Incision site prineo glue dressing assessed and is clean,dry, and intact, no signs of redness, drainage, or odor noted.  patient's bedside RN Ozzy notified of patient completing discharge instructions. Nurse Navigator and Orthopedic Office contact information on discharge instructions and provided to patient. Incentive spirometer in patient possession and educated on surgeon's instructions regarding. Educated patient on abstaining from alcohol consumption while taking narcotic medication and while on prescribed blood thinner.     Response- printed oxycodone prescription with change in directions handed to patient, prescriptions medications delivered to patient via meds to bed program, prescription medications to be filled by [facility] retail pharmacy and then delivered. Disposition is  home with son Juan Alberto and [facility], Durable Medical Equipment to be delivered to patient by [facility]. Patient to be transported by Juan Alberto . Documented belongings in patient's possession.     Future Appointments   Date Time Provider Department Center   8/22/2024 10:30 AM Thierno Gastelum MD [facility]   8/29/2024  4:00 PM Jeffrey Alexandra MD [facility]

## 2024-08-08 NOTE — PLAN OF CARE
Problem: Discharge Planning  Goal: Discharge to home or other facility with appropriate resources  8/8/2024 0606 by Victorino Chew RN  Outcome: Progressing  Flowsheets (Taken 8/8/2024 0606)  Discharge to home or other facility with appropriate resources:   Identify barriers to discharge with patient and caregiver   Arrange for needed discharge resources and transportation as appropriate     Problem: Chronic Conditions and Co-morbidities  Goal: Patient's chronic conditions and co-morbidity symptoms are monitored and maintained or improved  8/8/2024 0606 by Victorino Chew RN  Outcome: Progressing  Flowsheets (Taken 8/8/2024 0606)  Care Plan - Patient's Chronic Conditions and Co-Morbidity Symptoms are Monitored and Maintained or Improved:   Monitor and assess patient's chronic conditions and comorbid symptoms for stability, deterioration, or improvement   Collaborate with multidisciplinary team to address chronic and comorbid conditions and prevent exacerbation or deterioration     Problem: Neurosensory - Adult  Goal: Achieves stable or improved neurological status  8/8/2024 0606 by Victorino Chew RN  Outcome: Progressing  Flowsheets (Taken 8/8/2024 0606)  Achieves stable or improved neurological status:   Assess for and report changes in neurological status   Initiate measures to prevent increased intracranial pressure   Maintain blood pressure and fluid volume within ordered parameters to optimize cerebral perfusion and minimize risk of hemorrhage     Problem: Respiratory - Adult  Goal: Achieves optimal ventilation and oxygenation  8/8/2024 0606 by Victorino Chew RN  Outcome: Progressing  Flowsheets (Taken 8/8/2024 0606)  Achieves optimal ventilation and oxygenation:   Assess for changes in respiratory status   Assess for changes in mentation and behavior   Position to facilitate oxygenation and minimize respiratory effort     Problem: Skin/Tissue Integrity - Adult  Goal: Incisions, wounds, or drain sites

## 2024-08-08 NOTE — CARE COORDINATION
DISCHARGE SUMMARY     DATE OF DISCHARGE: 8/8/24    DISCHARGE DESTINATION: Home      HOME CARE AGENCY: [facility]              PHONE NUMBER: [phone]             FAX NUMBER: [phone]      DME ORDERED: NUNO / [place]

## 2024-08-09 NOTE — DISCHARGE SUMMARY
Physician Discharge Summary     Patient ID:  Olivia Garcia  [id number]  54 y.o.  1970    Admit date: 8/7/2024    Discharge date and time: 8/8/2024  1:00 PM     Admitting Physician: Thierno Gastelum MD     Discharge Physician: BETTIE Gastelum    Admission Diagnoses: Degenerative arthritis of left knee [M17.12]  Arthritis of left knee [M17.12]    Discharge Diagnoses: left knee OA    Admission Condition: good    Discharged Condition: good    Indication for Admission: Failed conservative treatment as outpatient for joint pain including PT and pain meds. This patient was then electively scheduled for total joint replacement surgery    Surgical procedure: left TKA    Consults: PT OT SS    This patient had no postoperative complications. They has PT and OT for ADL's . IV and PO pain med for pain control and was eventually DC in stable condition    Treatments: analgesia,  therapies: PT OT,  and surgery      Disposition: home    Patient Instructions:   [unfilled]  Activity: activity as tolerated  Diet: regular diet  Wound Care: keep wound clean and dry    Follow-up with BETTIE Gastelum in 2 weeks.    Signed:  MILES Chen CNP  8/9/2024  11:28 AM

## 2024-08-12 ENCOUNTER — TELEPHONE (OUTPATIENT)
Dept: ORTHOPEDICS UNIT | Age: 54
End: 2024-08-12

## 2024-08-12 DIAGNOSIS — M17.12 ARTHRITIS OF LEFT KNEE: ICD-10-CM

## 2024-08-12 RX ORDER — OXYCODONE HYDROCHLORIDE 10 MG/1
10 TABLET ORAL EVERY 4 HOURS PRN
Qty: 40 TABLET | Refills: 0 | Status: SHIPPED | OUTPATIENT
Start: 2024-08-12 | End: 2024-08-19

## 2024-08-12 NOTE — TELEPHONE ENCOUNTER
Spoke with Patient regarding post discharge from hospital.    Incision status: No drainage, odor, or redness noted    Edema/Swelling/Teds: reports edema noted to operative site and using ice , elevating legs when resting.  Xavier hose - not applicable.    Pain level and status: 6-7/10    Use of pain medications: states taking prescribed pain medication with relief    Blood thinner: Eliquis 2.5mg ; Verified with patient that they are taking their anticoagulant as prescribed twice a day.     Bowels: reports no complaints    Home Care Agency active: Active and continues with therapy, saw therapist Friday.    Do you have all of your medications: No - needs oxycodone refilled, prefers [facility] on [address]    Changes in medications: No    No other questions/concerns at this time. Encouraged patient to call Orthopedic Nurse Navigator Idalia Lopez or Orthopedic office if has any questions/concerns.      Follow up appointments:    Future Appointments   Date Time Provider Department Center   8/22/2024 10:30 AM Thierno Gastelum MD [facility]   8/29/2024  4:00 PM Jeffrey Alexandra MD [facility]     Electronically signed by Idalia Lopez RN on 8/12/2024 at 2:18 PM

## 2024-08-21 ENCOUNTER — TELEPHONE (OUTPATIENT)
Dept: ORTHOPEDIC SURGERY | Age: 54
End: 2024-08-21

## 2024-08-22 ENCOUNTER — OFFICE VISIT (OUTPATIENT)
Dept: ORTHOPEDIC SURGERY | Age: 54
End: 2024-08-22

## 2024-08-22 DIAGNOSIS — Z96.652 STATUS POST TOTAL LEFT KNEE REPLACEMENT: Primary | ICD-10-CM

## 2024-08-22 PROCEDURE — 99024 POSTOP FOLLOW-UP VISIT: CPT | Performed by: PHYSICIAN ASSISTANT

## 2024-08-22 NOTE — PROGRESS NOTES
This dictation was done with [employer] dictation and may contain mechanical errors related to translation.  There were no vitals taken for this visit.    This is a very pleasant 54-year-old female who had a left total knee replacement on 8/7/2024.  Initially looking at her she has good healing incision no signs of infection we talked about wound care.  She was sent for an AP lateral and sunrise view of her left knee.    Xray three views of the total knee arthroplasty reveals satisfactory alignment of the prosthesis . No signs of significant polyethylene wear or failure. No progressive radiolucencies,fractures, tumors or dislocations.    Neurologically she is intact to her left foot she has approximately 0 to 90 degrees of motion already she has 3 out of 10 on the pain scale.  Will look to prescribe outpatient physical therapy and see her in follow-up in 4 to 6 weeks

## 2024-09-04 ENCOUNTER — HOSPITAL ENCOUNTER (OUTPATIENT)
Dept: PHYSICAL THERAPY | Age: 54
Setting detail: THERAPIES SERIES
Discharge: HOME | End: 2024-09-04
Attending: ORTHOPAEDIC SURGERY
Payer: COMMERCIAL

## 2024-09-04 PROCEDURE — 97110 THERAPEUTIC EXERCISES: CPT

## 2024-09-04 PROCEDURE — 97112 NEUROMUSCULAR REEDUCATION: CPT

## 2024-09-04 PROCEDURE — 97164 PT RE-EVAL EST PLAN CARE: CPT

## 2024-09-09 ENCOUNTER — HOSPITAL ENCOUNTER (OUTPATIENT)
Dept: PHYSICAL THERAPY | Age: 54
Setting detail: THERAPIES SERIES
Discharge: HOME | End: 2024-09-09
Attending: ORTHOPAEDIC SURGERY
Payer: COMMERCIAL

## 2024-09-09 PROCEDURE — 97112 NEUROMUSCULAR REEDUCATION: CPT

## 2024-09-09 PROCEDURE — 97110 THERAPEUTIC EXERCISES: CPT

## 2024-09-11 ENCOUNTER — HOSPITAL ENCOUNTER (OUTPATIENT)
Dept: PHYSICAL THERAPY | Age: 54
Setting detail: THERAPIES SERIES
Discharge: HOME | End: 2024-09-11
Attending: ORTHOPAEDIC SURGERY
Payer: COMMERCIAL

## 2024-09-11 PROCEDURE — 97112 NEUROMUSCULAR REEDUCATION: CPT

## 2024-09-11 PROCEDURE — 97110 THERAPEUTIC EXERCISES: CPT

## 2024-09-17 ENCOUNTER — APPOINTMENT (OUTPATIENT)
Dept: PHYSICAL THERAPY | Age: 54
End: 2024-09-17
Attending: ORTHOPAEDIC SURGERY
Payer: COMMERCIAL

## 2024-09-18 ENCOUNTER — APPOINTMENT (OUTPATIENT)
Dept: PHYSICAL THERAPY | Age: 54
End: 2024-09-18
Attending: ORTHOPAEDIC SURGERY
Payer: COMMERCIAL

## 2024-09-19 ENCOUNTER — APPOINTMENT (OUTPATIENT)
Dept: PHYSICAL THERAPY | Age: 54
End: 2024-09-19
Attending: ORTHOPAEDIC SURGERY
Payer: COMMERCIAL

## 2024-09-19 ENCOUNTER — OFFICE VISIT (OUTPATIENT)
Dept: ORTHOPEDIC SURGERY | Age: 54
End: 2024-09-19

## 2024-09-19 VITALS — HEIGHT: 66 IN | BODY MASS INDEX: 47.09 KG/M2 | WEIGHT: 293 LBS

## 2024-09-19 DIAGNOSIS — Z96.652 STATUS POST TOTAL LEFT KNEE REPLACEMENT: Primary | ICD-10-CM

## 2024-09-19 PROCEDURE — 99024 POSTOP FOLLOW-UP VISIT: CPT | Performed by: ORTHOPAEDIC SURGERY

## 2024-09-20 ENCOUNTER — APPOINTMENT (OUTPATIENT)
Dept: PHYSICAL THERAPY | Age: 54
End: 2024-09-20
Attending: ORTHOPAEDIC SURGERY
Payer: COMMERCIAL

## 2024-09-23 ENCOUNTER — HOSPITAL ENCOUNTER (OUTPATIENT)
Dept: PHYSICAL THERAPY | Age: 54
Setting detail: THERAPIES SERIES
Discharge: HOME | End: 2024-09-23
Attending: ORTHOPAEDIC SURGERY
Payer: COMMERCIAL

## 2024-09-23 PROCEDURE — 97112 NEUROMUSCULAR REEDUCATION: CPT

## 2024-09-23 PROCEDURE — 97530 THERAPEUTIC ACTIVITIES: CPT

## 2024-09-23 PROCEDURE — 97110 THERAPEUTIC EXERCISES: CPT

## 2024-09-25 ENCOUNTER — HOSPITAL ENCOUNTER (OUTPATIENT)
Dept: PHYSICAL THERAPY | Age: 54
Setting detail: THERAPIES SERIES
End: 2024-09-25
Attending: ORTHOPAEDIC SURGERY
Payer: COMMERCIAL

## 2024-11-04 ENCOUNTER — OFFICE VISIT (OUTPATIENT)
Dept: ORTHOPEDIC SURGERY | Age: 54
End: 2024-11-04

## 2024-11-04 VITALS — HEIGHT: 66 IN | BODY MASS INDEX: 54.23 KG/M2

## 2024-11-04 DIAGNOSIS — Z96.652 STATUS POST TOTAL LEFT KNEE REPLACEMENT: Primary | ICD-10-CM

## 2024-11-04 PROCEDURE — 99024 POSTOP FOLLOW-UP VISIT: CPT | Performed by: ORTHOPAEDIC SURGERY

## 2024-11-04 PROCEDURE — 1073RET COMPLETION OF WORKABILITY PRESCRIPTION: Performed by: ORTHOPAEDIC SURGERY

## 2025-08-07 ENCOUNTER — OFFICE VISIT (OUTPATIENT)
Dept: ORTHOPEDIC SURGERY | Age: 55
End: 2025-08-07
Payer: COMMERCIAL

## 2025-08-07 VITALS — HEIGHT: 66 IN | WEIGHT: 293 LBS | BODY MASS INDEX: 47.09 KG/M2

## 2025-08-07 DIAGNOSIS — Z96.652 STATUS POST TOTAL LEFT KNEE REPLACEMENT: Primary | ICD-10-CM

## 2025-08-07 DIAGNOSIS — M25.561 RIGHT KNEE PAIN, UNSPECIFIED CHRONICITY: ICD-10-CM

## 2025-08-07 DIAGNOSIS — M17.11 PRIMARY OSTEOARTHRITIS OF RIGHT KNEE: ICD-10-CM

## 2025-08-07 PROCEDURE — 99215 OFFICE O/P EST HI 40 MIN: CPT | Performed by: ORTHOPAEDIC SURGERY

## 2025-08-11 ENCOUNTER — TELEPHONE (OUTPATIENT)
Dept: ORTHOPEDIC SURGERY | Age: 55
End: 2025-08-11

## 2025-08-12 PROBLEM — M17.11 OSTEOARTHRITIS OF RIGHT KNEE: Status: ACTIVE | Noted: 2025-08-12

## 2025-08-14 ENCOUNTER — HOSPITAL ENCOUNTER (OUTPATIENT)
Dept: CT IMAGING | Age: 55
Discharge: HOME | End: 2025-08-14
Attending: ORTHOPAEDIC SURGERY
Payer: COMMERCIAL

## 2025-08-14 DIAGNOSIS — M17.11 PRIMARY OSTEOARTHRITIS OF RIGHT KNEE: ICD-10-CM

## 2025-08-14 PROCEDURE — 73700 CT LOWER EXTREMITY W/O DYE: CPT

## 2025-08-18 ENCOUNTER — HOSPITAL ENCOUNTER (OUTPATIENT)
Dept: LAB | Age: 55
Discharge: HOME | End: 2025-08-18
Payer: COMMERCIAL

## 2025-08-18 DIAGNOSIS — M17.11 PRIMARY OSTEOARTHRITIS OF RIGHT KNEE: ICD-10-CM

## 2025-08-18 LAB
25(OH)D3 SERPL-MCNC: 35.1 NG/ML
ABO/RH: NORMAL
ALBUMIN SERPL-MCNC: 4.2 G/DL (ref 3.4–5)
ANION GAP SERPL CALCULATED.3IONS-SCNC: 16 MMOL/L (ref 3–16)
ANTIBODY SCREEN: NORMAL
APTT BLD: 29.6 SEC (ref 22.8–35.8)
BASOPHILS # BLD: 0 K/UL (ref 0–0.2)
BASOPHILS NFR BLD: 0.3 %
BUN SERPL-MCNC: 18 MG/DL (ref 7–20)
CALCIUM SERPL-MCNC: 9.5 MG/DL (ref 8.3–10.6)
CHLORIDE SERPL-SCNC: 101 MMOL/L (ref 99–110)
CO2 SERPL-SCNC: 22 MMOL/L (ref 21–32)
CREAT SERPL-MCNC: 0.6 MG/DL (ref 0.6–1.1)
DEPRECATED RDW RBC AUTO: 15.6 % (ref 12.4–15.4)
EKG ATRIAL RATE: 73 BPM
EKG DIAGNOSIS: NORMAL
EKG P AXIS: 74 DEGREES
EKG P-R INTERVAL: 164 MS
EKG Q-T INTERVAL: 432 MS
EKG QRS DURATION: 136 MS
EKG QTC CALCULATION (BAZETT): 475 MS
EKG R AXIS: -30 DEGREES
EKG T AXIS: 19 DEGREES
EKG VENTRICULAR RATE: 73 BPM
EOSINOPHIL # BLD: 0 K/UL (ref 0–0.6)
EOSINOPHIL NFR BLD: 0 %
EST. AVERAGE GLUCOSE BLD GHB EST-MCNC: 114 MG/DL
GFR SERPLBLD CREATININE-BSD FMLA CKD-EPI: >90 ML/MIN/{1.73_M2}
GLUCOSE SERPL-MCNC: 145 MG/DL (ref 70–99)
HBA1C MFR BLD: 5.6 %
HCT VFR BLD AUTO: 36.9 % (ref 36–48)
HGB BLD-MCNC: 12 G/DL (ref 12–16)
INR PPP: 0.97 (ref 0.86–1.14)
LYMPHOCYTES # BLD: 0.9 K/UL (ref 1–5.1)
LYMPHOCYTES NFR BLD: 4.9 %
MCH RBC QN AUTO: 27.1 PG (ref 26–34)
MCHC RBC AUTO-ENTMCNC: 32.5 G/DL (ref 31–36)
MCV RBC AUTO: 83.5 FL (ref 80–100)
MONOCYTES # BLD: 0.4 K/UL (ref 0–1.3)
MONOCYTES NFR BLD: 2.1 %
NEUTROPHILS # BLD: 16.1 K/UL (ref 1.7–7.7)
NEUTROPHILS NFR BLD: 92.7 %
PLATELET # BLD AUTO: 379 K/UL (ref 135–450)
PMV BLD AUTO: 7.7 FL (ref 5–10.5)
POTASSIUM SERPL-SCNC: 4 MMOL/L (ref 3.5–5.1)
PREALB SERPL-MCNC: 21.5 MG/DL (ref 20–40)
PROTHROMBIN TIME: 13.2 SEC (ref 12.1–14.9)
RBC # BLD AUTO: 4.42 M/UL (ref 4–5.2)
SODIUM SERPL-SCNC: 139 MMOL/L (ref 136–145)
WBC # BLD AUTO: 17.4 K/UL (ref 4–11)

## 2025-08-18 PROCEDURE — 93010 ELECTROCARDIOGRAM REPORT: CPT | Performed by: INTERNAL MEDICINE

## 2025-08-18 PROCEDURE — 86900 BLOOD TYPING SEROLOGIC ABO: CPT

## 2025-08-18 PROCEDURE — 36415 COLL VENOUS BLD VENIPUNCTURE: CPT

## 2025-08-18 PROCEDURE — 93005 ELECTROCARDIOGRAM TRACING: CPT | Performed by: ORTHOPAEDIC SURGERY

## 2025-08-18 PROCEDURE — 82040 ASSAY OF SERUM ALBUMIN: CPT

## 2025-08-18 PROCEDURE — 80048 BASIC METABOLIC PNL TOTAL CA: CPT

## 2025-08-18 PROCEDURE — 86850 RBC ANTIBODY SCREEN: CPT

## 2025-08-18 PROCEDURE — 83036 HEMOGLOBIN GLYCOSYLATED A1C: CPT

## 2025-08-18 PROCEDURE — 87641 MR-STAPH DNA AMP PROBE: CPT

## 2025-08-18 PROCEDURE — 84134 ASSAY OF PREALBUMIN: CPT

## 2025-08-18 PROCEDURE — 86901 BLOOD TYPING SEROLOGIC RH(D): CPT

## 2025-08-18 PROCEDURE — 85610 PROTHROMBIN TIME: CPT

## 2025-08-18 PROCEDURE — 85730 THROMBOPLASTIN TIME PARTIAL: CPT

## 2025-08-18 PROCEDURE — 85025 COMPLETE CBC W/AUTO DIFF WBC: CPT

## 2025-08-18 PROCEDURE — 82306 VITAMIN D 25 HYDROXY: CPT

## 2025-08-20 ENCOUNTER — TELEPHONE (OUTPATIENT)
Dept: ORTHOPEDIC SURGERY | Age: 55
End: 2025-08-20

## 2025-08-21 LAB — MRSA DNA SPEC QL NAA+PROBE: NORMAL

## 2025-08-25 ENCOUNTER — OFFICE VISIT (OUTPATIENT)
Dept: ORTHOPEDIC SURGERY | Age: 55
End: 2025-08-25
Payer: COMMERCIAL

## 2025-08-25 VITALS — HEIGHT: 66 IN | BODY MASS INDEX: 47.09 KG/M2 | WEIGHT: 293 LBS

## 2025-08-25 DIAGNOSIS — M17.11 PRIMARY OSTEOARTHRITIS OF RIGHT KNEE: Primary | ICD-10-CM

## 2025-08-25 PROCEDURE — 99213 OFFICE O/P EST LOW 20 MIN: CPT | Performed by: ORTHOPAEDIC SURGERY

## 2025-09-03 ENCOUNTER — TELEPHONE (OUTPATIENT)
Dept: ORTHOPEDICS UNIT | Age: 55
End: 2025-09-03

## (undated) DEVICE — SYRINGE MED 30ML STD CLR PLAS LUERLOCK TIP N CTRL DISP

## (undated) DEVICE — HYPODERMIC SAFETY NEEDLE: Brand: MONOJECT

## (undated) DEVICE — DRAPE,ORTHOMAX,EXTREMITY: Brand: MEDLINE

## (undated) DEVICE — MATTRESS MAXI AIR TRNSF SGL PT USE DCS 37 45 48 52 75

## (undated) DEVICE — BASIC SINGLE BASIN 1-LF: Brand: MEDLINE INDUSTRIES, INC.

## (undated) DEVICE — SOLUTION PREP PAINT POV IOD FOR SKIN MUCOUS MEM

## (undated) DEVICE — DUAL CUT SAGITTAL BLADE

## (undated) DEVICE — KIT DRP FOR RIO ROBOTIC ARM ASST SYS

## (undated) DEVICE — SUTURE ABSORBABLE MONOFILAMENT 1 OS-8 36 CM 40 MM VIO PDS +

## (undated) DEVICE — MERCY HEALTH WEST TURNOVER: Brand: MEDLINE INDUSTRIES, INC.

## (undated) DEVICE — 3M™ COBAN™ NL STERILE NON-LATEX SELF-ADHERENT WRAP, 2086S, 6 IN X 5 YD (15 CM X 4,5 M), 12 ROLLS/CASE: Brand: 3M™ COBAN™

## (undated) DEVICE — ADHESIVE SKIN CLOSURE XL 42 CM 2.7 CC MESH LIQUIBAND SECUR

## (undated) DEVICE — PIN BNE FIX L110MM DIA32MM

## (undated) DEVICE — SOLUTION IV 1000ML 0.9% SOD CHL FOR IRRIG PLAS CONT

## (undated) DEVICE — BANDAGE COMPR W6INXL15YD WHT BGE POLY COT WV E HK LOOP CLSR

## (undated) DEVICE — CORD RETRCT SIL - ORDER MULTIPLES OF 10 EACH

## (undated) DEVICE — KIT INT FIX FEM TIB CKPT MAKOPLASTY

## (undated) DEVICE — PADDING CAST W6INXL4YD COT LO LINTING WYTEX

## (undated) DEVICE — PAD,NON-ADHERENT,3X8,STERILE,LF,1/PK: Brand: MEDLINE

## (undated) DEVICE — ELECTRODE PT RET AD L9FT HI MOIST COND ADH HYDRGEL CORDED

## (undated) DEVICE — ENDOSCOPY KIT: Brand: MEDLINE INDUSTRIES, INC.

## (undated) DEVICE — BOOT POS LEG DEMAYO

## (undated) DEVICE — KIT TRK KNEE PROC VIZADISC

## (undated) DEVICE — SUTURE STRATAFIX SPRL SZ 2 0 L14IN ABSRB UD MH L36MM 1 2 CIR SXMD2B401

## (undated) DEVICE — SOLUTION WND IRRIGATION 450 ML 0.5 PVP-I 0.9 NACL

## (undated) DEVICE — SUTURE VICRYL + SZ 1 L18IN ABSRB UD L36MM CT-1 1/2 CIR VCP841D

## (undated) DEVICE — GLOVE ORTHO 8   MSG9480

## (undated) DEVICE — GOWN SIRUS NONREIN XL W/TWL: Brand: MEDLINE INDUSTRIES, INC.

## (undated) DEVICE — PIN BNE FIX TEMP L140MM DIA4MM MAKO

## (undated) DEVICE — TOWEL,OR,DSP,ST,BLUE,STD,4/PK,20PK/CS: Brand: MEDLINE

## (undated) DEVICE — TOTAL KNEE: Brand: MEDLINE INDUSTRIES, INC.

## (undated) DEVICE — SUTURE STRATAFIX SPRL SZ 2 0 L14IN ABSRB UD MH L36MM 1 2 CIR SXMP2B401

## (undated) DEVICE — TRANSFER SET 3": Brand: MEDLINE INDUSTRIES, INC.

## (undated) DEVICE — GLOVE SURG SZ 8 L12IN FNGR THK79MIL GRN LTX FREE

## (undated) DEVICE — BLADE SURG SAW STD S STL OSC W/ SERR EDGE DISP

## (undated) DEVICE — SUTURE MONOCRYL STRATAFIX SPRL SZ 3-0 L12IN ABSRB UD FS-1 L30X30CM SXMP2B410